# Patient Record
Sex: MALE | Race: WHITE | NOT HISPANIC OR LATINO | Employment: FULL TIME | URBAN - METROPOLITAN AREA
[De-identification: names, ages, dates, MRNs, and addresses within clinical notes are randomized per-mention and may not be internally consistent; named-entity substitution may affect disease eponyms.]

---

## 2020-10-27 PROBLEM — M62.08 DIASTASIS RECTI: Status: ACTIVE | Noted: 2019-03-14

## 2020-10-27 PROBLEM — R06.83 SNORING: Status: ACTIVE | Noted: 2019-03-14

## 2020-10-27 PROBLEM — N52.8 OTHER MALE ERECTILE DYSFUNCTION: Status: ACTIVE | Noted: 2017-08-09

## 2020-10-27 PROBLEM — R73.01 IMPAIRED FASTING GLUCOSE: Status: ACTIVE | Noted: 2020-02-14

## 2020-10-27 RX ORDER — SILDENAFIL CITRATE 20 MG/1
5 TABLET ORAL
COMMUNITY
Start: 2017-08-09 | End: 2020-10-28

## 2020-10-27 RX ORDER — HYDROCHLOROTHIAZIDE 25 MG/1
25 TABLET ORAL DAILY
COMMUNITY
Start: 2020-02-17 | End: 2021-03-16

## 2020-10-28 ENCOUNTER — OFFICE VISIT (OUTPATIENT)
Dept: FAMILY MEDICINE CLINIC | Facility: CLINIC | Age: 65
End: 2020-10-28
Payer: COMMERCIAL

## 2020-10-28 VITALS
SYSTOLIC BLOOD PRESSURE: 138 MMHG | HEART RATE: 84 BPM | WEIGHT: 294 LBS | DIASTOLIC BLOOD PRESSURE: 80 MMHG | HEIGHT: 74 IN | OXYGEN SATURATION: 97 % | TEMPERATURE: 98.2 F | RESPIRATION RATE: 16 BRPM | BODY MASS INDEX: 37.73 KG/M2

## 2020-10-28 DIAGNOSIS — Z11.59 NEED FOR HEPATITIS C SCREENING TEST: ICD-10-CM

## 2020-10-28 DIAGNOSIS — I10 BENIGN ESSENTIAL HYPERTENSION: ICD-10-CM

## 2020-10-28 DIAGNOSIS — R73.01 IMPAIRED FASTING GLUCOSE: ICD-10-CM

## 2020-10-28 DIAGNOSIS — Z00.00 ANNUAL PHYSICAL EXAM: ICD-10-CM

## 2020-10-28 DIAGNOSIS — M51.37 DDD (DEGENERATIVE DISC DISEASE), LUMBOSACRAL: ICD-10-CM

## 2020-10-28 DIAGNOSIS — Z12.5 PROSTATE CANCER SCREENING: ICD-10-CM

## 2020-10-28 DIAGNOSIS — E78.6 LOW HDL (UNDER 40): ICD-10-CM

## 2020-10-28 DIAGNOSIS — Z23 NEEDS FLU SHOT: ICD-10-CM

## 2020-10-28 DIAGNOSIS — Z00.00 ENCOUNTER FOR ANNUAL PHYSICAL EXAM: Primary | ICD-10-CM

## 2020-10-28 DIAGNOSIS — N40.0 BENIGN PROSTATIC HYPERPLASIA, UNSPECIFIED WHETHER LOWER URINARY TRACT SYMPTOMS PRESENT: ICD-10-CM

## 2020-10-28 PROCEDURE — 3075F SYST BP GE 130 - 139MM HG: CPT | Performed by: FAMILY MEDICINE

## 2020-10-28 PROCEDURE — 90471 IMMUNIZATION ADMIN: CPT

## 2020-10-28 PROCEDURE — 3008F BODY MASS INDEX DOCD: CPT | Performed by: FAMILY MEDICINE

## 2020-10-28 PROCEDURE — 3288F FALL RISK ASSESSMENT DOCD: CPT | Performed by: FAMILY MEDICINE

## 2020-10-28 PROCEDURE — 3079F DIAST BP 80-89 MM HG: CPT | Performed by: FAMILY MEDICINE

## 2020-10-28 PROCEDURE — 99387 INIT PM E/M NEW PAT 65+ YRS: CPT | Performed by: FAMILY MEDICINE

## 2020-10-28 PROCEDURE — 1160F RVW MEDS BY RX/DR IN RCRD: CPT | Performed by: FAMILY MEDICINE

## 2020-10-28 PROCEDURE — 3725F SCREEN DEPRESSION PERFORMED: CPT | Performed by: FAMILY MEDICINE

## 2020-10-28 PROCEDURE — 90662 IIV NO PRSV INCREASED AG IM: CPT

## 2020-10-28 PROCEDURE — 1101F PT FALLS ASSESS-DOCD LE1/YR: CPT | Performed by: FAMILY MEDICINE

## 2020-10-28 RX ORDER — TAMSULOSIN HYDROCHLORIDE 0.4 MG/1
0.4 CAPSULE ORAL
Qty: 90 CAPSULE | Refills: 3 | Status: SHIPPED | OUTPATIENT
Start: 2020-10-28 | End: 2021-10-13 | Stop reason: SDUPTHER

## 2020-10-28 RX ORDER — ENALAPRIL MALEATE 20 MG/1
TABLET ORAL
COMMUNITY
Start: 2020-10-07 | End: 2021-01-20

## 2020-11-06 ENCOUNTER — TELEPHONE (OUTPATIENT)
Dept: FAMILY MEDICINE CLINIC | Facility: CLINIC | Age: 65
End: 2020-11-06

## 2020-11-09 ENCOUNTER — TELEPHONE (OUTPATIENT)
Dept: FAMILY MEDICINE CLINIC | Facility: CLINIC | Age: 65
End: 2020-11-09

## 2020-11-10 ENCOUNTER — TELEPHONE (OUTPATIENT)
Dept: FAMILY MEDICINE CLINIC | Facility: CLINIC | Age: 65
End: 2020-11-10

## 2020-11-16 LAB — HCV AB SER-ACNC: NEGATIVE

## 2021-01-20 DIAGNOSIS — I10 BENIGN ESSENTIAL HYPERTENSION: Primary | ICD-10-CM

## 2021-01-20 RX ORDER — ENALAPRIL MALEATE 20 MG/1
TABLET ORAL
Qty: 30 TABLET | Refills: 0 | Status: SHIPPED | OUTPATIENT
Start: 2021-01-20 | End: 2021-02-15 | Stop reason: SDUPTHER

## 2021-02-04 ENCOUNTER — TELEPHONE (OUTPATIENT)
Dept: FAMILY MEDICINE CLINIC | Facility: CLINIC | Age: 66
End: 2021-02-04

## 2021-02-04 NOTE — TELEPHONE ENCOUNTER
Patient called about the covid vaccine, he wants to know if you feel there is any issue/health reasons why he should not reeceive the vaccine      Please call him at [phone]

## 2021-02-04 NOTE — TELEPHONE ENCOUNTER
Spoke to pt made him aware per Dr Claudia Talbot he should get the vaccine  I gave him info on My Chart and sent him a link for it  I also told him I can add him to my list to get scheduled once we go into the next phase    Riccardo Phillip

## 2021-02-04 NOTE — TELEPHONE ENCOUNTER
Let patient know that I would recommend he get the vaccine  He can sign up for my chart and complete questionnaire  Will receive notification when eligible

## 2021-02-15 DIAGNOSIS — I10 BENIGN ESSENTIAL HYPERTENSION: ICD-10-CM

## 2021-02-15 RX ORDER — ENALAPRIL MALEATE 20 MG/1
20 TABLET ORAL DAILY
Qty: 30 TABLET | Refills: 3 | Status: SHIPPED | OUTPATIENT
Start: 2021-02-15 | End: 2021-08-03

## 2021-03-10 DIAGNOSIS — Z23 ENCOUNTER FOR IMMUNIZATION: ICD-10-CM

## 2021-03-12 DIAGNOSIS — I10 BENIGN ESSENTIAL HYPERTENSION: Primary | ICD-10-CM

## 2021-03-16 RX ORDER — HYDROCHLOROTHIAZIDE 25 MG/1
25 TABLET ORAL DAILY
Qty: 30 TABLET | Refills: 5 | Status: SHIPPED | OUTPATIENT
Start: 2021-03-16 | End: 2021-10-13 | Stop reason: SDUPTHER

## 2021-07-07 NOTE — PROGRESS NOTES
Assessment/Plan:    No problem-specific Assessment & Plan notes found for this encounter  Diagnoses and all orders for this visit:    Benign essential hypertension  Not at goal    Patient hesitant to increase medication at this time or add any other meds right now  He will try and watch diet more closely and decrease salt in diet  Start exercising more  Will agree to get cuff and check ambulatory blood pressures and message me with readings in a month or so  Recheck bp here in office in 2 months  Low HDL (under 40)  -     Lipid panel; Future  Exercise to increase HDL  Benign prostatic hyperplasia, unspecified whether lower urinary tract symptoms present  Taking flomax and tolerating well  Still gets up once at night to urinate  Impaired fasting glucose  -     Comprehensive metabolic panel; Future  -     Hemoglobin A1C; Future    Screening for prostate cancer  -     PSA, Total Screen; Future    Class 2 obesity due to excess calories without serious comorbidity with body mass index (BMI) of 37 0 to 37 9 in adult        BMI Counseling: Body mass index is 37 36 kg/m²  The BMI is above normal  Nutrition recommendations include decreasing portion sizes and encouraging healthy choices of fruits and vegetables  Exercise recommendations include exercising 3-5 times per week  No pharmacotherapy was ordered  Subjective:      Patient ID: Balta Glover is a 76 y o  male with hypertension, impaired fasting glucose, low HDL and BPH who is here today for routine f/u visit  Is taking his bp meds as prescribed  No chest pain, shortness of breath or palpitations  No headache or dizziness  Had his colonoscopy with Dr Terrance Snow in November of last year  Had a tubular adenoma  Received both covid vacccines  Not exercising regularly  Hoping to go back to [employer]  States that he could be eating better  Working from home and it's too easy for him to walk to kitchen and snack during day  Knees bother him  Had seen Dr Emil Marley in past and has appt scheduled for next Wednesday  HPI    The following portions of the patient's history were reviewed and updated as appropriate: He  has a past medical history of BPH (benign prostatic hyperplasia), Dyslipidemia, Hypertension, Impaired fasting glucose, and Osteoarthritis of both knees  He  has a past surgical history that includes Colonoscopy (11/23/2020)  He  reports that he has never smoked  He has never used smokeless tobacco  He reports current alcohol use  He reports that he does not use drugs  Current Outpatient Medications on File Prior to Visit   Medication Sig    enalapril (VASOTEC) 20 mg tablet Take 1 tablet (20 mg total) by mouth daily    hydrochlorothiazide (HYDRODIURIL) 25 mg tablet Take 1 tablet (25 mg total) by mouth daily    tamsulosin (FLOMAX) 0 4 mg Take 1 capsule (0 4 mg total) by mouth daily with dinner     No current facility-administered medications on file prior to visit  He has No Known Allergies       Review of Systems      Objective:      /80 (BP Location: Left arm, Patient Position: Sitting, Cuff Size: Large)   Pulse 91   Temp 97 5 °F (36 4 °C) (Temporal)   Resp 16   Ht 6' 2" (1 88 m)   Wt 132 kg (291 lb)   SpO2 96%   BMI 37 36 kg/m²          Physical Exam  Vitals and nursing note reviewed  Constitutional:       General: He is not in acute distress  Appearance: Normal appearance  He is not ill-appearing, toxic-appearing or diaphoretic  Comments: Antalgic gait   HENT:      Head: Normocephalic and atraumatic  Eyes:      Conjunctiva/sclera: Conjunctivae normal    Neck:      Comments: No thyromegaly  Cardiovascular:      Rate and Rhythm: Normal rate and regular rhythm  Heart sounds: No murmur heard  Pulmonary:      Effort: Pulmonary effort is normal       Breath sounds: Normal breath sounds  Musculoskeletal:      Cervical back: Normal range of motion        Right lower leg: No edema    Lymphadenopathy:      Cervical: No cervical adenopathy  Neurological:      Mental Status: He is alert     Psychiatric:         Mood and Affect: Mood normal

## 2021-07-08 ENCOUNTER — OFFICE VISIT (OUTPATIENT)
Dept: FAMILY MEDICINE CLINIC | Facility: CLINIC | Age: 66
End: 2021-07-08
Payer: COMMERCIAL

## 2021-07-08 VITALS
OXYGEN SATURATION: 96 % | BODY MASS INDEX: 37.35 KG/M2 | WEIGHT: 291 LBS | RESPIRATION RATE: 16 BRPM | HEIGHT: 74 IN | SYSTOLIC BLOOD PRESSURE: 140 MMHG | DIASTOLIC BLOOD PRESSURE: 82 MMHG | TEMPERATURE: 97.5 F | HEART RATE: 91 BPM

## 2021-07-08 DIAGNOSIS — R73.01 IMPAIRED FASTING GLUCOSE: ICD-10-CM

## 2021-07-08 DIAGNOSIS — E66.09 CLASS 2 OBESITY DUE TO EXCESS CALORIES WITHOUT SERIOUS COMORBIDITY WITH BODY MASS INDEX (BMI) OF 37.0 TO 37.9 IN ADULT: ICD-10-CM

## 2021-07-08 DIAGNOSIS — Z12.5 SCREENING FOR PROSTATE CANCER: ICD-10-CM

## 2021-07-08 DIAGNOSIS — N40.0 BENIGN PROSTATIC HYPERPLASIA, UNSPECIFIED WHETHER LOWER URINARY TRACT SYMPTOMS PRESENT: ICD-10-CM

## 2021-07-08 DIAGNOSIS — E78.6 LOW HDL (UNDER 40): ICD-10-CM

## 2021-07-08 DIAGNOSIS — I10 BENIGN ESSENTIAL HYPERTENSION: Primary | ICD-10-CM

## 2021-07-08 PROBLEM — E66.812 CLASS 2 OBESITY DUE TO EXCESS CALORIES WITHOUT SERIOUS COMORBIDITY IN ADULT: Status: ACTIVE | Noted: 2021-07-08

## 2021-07-08 PROCEDURE — 1160F RVW MEDS BY RX/DR IN RCRD: CPT | Performed by: FAMILY MEDICINE

## 2021-07-08 PROCEDURE — 1036F TOBACCO NON-USER: CPT | Performed by: FAMILY MEDICINE

## 2021-07-08 PROCEDURE — 3079F DIAST BP 80-89 MM HG: CPT | Performed by: FAMILY MEDICINE

## 2021-07-08 PROCEDURE — 3008F BODY MASS INDEX DOCD: CPT | Performed by: FAMILY MEDICINE

## 2021-07-08 PROCEDURE — 3077F SYST BP >= 140 MM HG: CPT | Performed by: FAMILY MEDICINE

## 2021-07-08 PROCEDURE — 3725F SCREEN DEPRESSION PERFORMED: CPT | Performed by: FAMILY MEDICINE

## 2021-07-08 PROCEDURE — 99214 OFFICE O/P EST MOD 30 MIN: CPT | Performed by: FAMILY MEDICINE

## 2021-07-08 NOTE — PATIENT INSTRUCTIONS
Get blood pressure cuff and check blood pressure readings at home  Message me with your readings in a month's time  Try and watch diet better and exercise more  Return in 2 months for bp check

## 2021-07-09 ENCOUNTER — TELEPHONE (OUTPATIENT)
Dept: ADMINISTRATIVE | Facility: OTHER | Age: 66
End: 2021-07-09

## 2021-07-09 NOTE — TELEPHONE ENCOUNTER
Upon review of the In Basket request and the patient's chart, initial outreach has been made via telephone call, please see Contacts section for details       Thank you  Mati Knight MA

## 2021-07-09 NOTE — TELEPHONE ENCOUNTER
----- Message from Shawnee Silva DO sent at 7/8/2021  7:05 AM EDT -----  07/08/21 7:05 AM    Hello, our patient Lalito Curtis has had CRC: Colonoscopy completed/performed  Please assist in updating the patient chart by making an External outreach to Dr Jessy Patel facility located in [employer]  The date of service is 11/23/20  (the path report is in [facility] care everywhere but actual report of colonoscopy is not)        Thank you,  Shawnee Silva DO  [facility]

## 2021-07-13 NOTE — TELEPHONE ENCOUNTER
Upon review of the In Basket request we were able to locate, review, and update the patient chart as requested for CRC: Colonoscopy  Any additional questions or concerns should be emailed to the Practice Liaisons via [email or web address] email, please do not reply via In Basket      Thank you  Charan muller MA

## 2021-08-03 DIAGNOSIS — I10 BENIGN ESSENTIAL HYPERTENSION: ICD-10-CM

## 2021-08-03 RX ORDER — ENALAPRIL MALEATE 20 MG/1
TABLET ORAL
Qty: 30 TABLET | Refills: 0 | Status: SHIPPED | OUTPATIENT
Start: 2021-08-03 | End: 2021-09-09

## 2021-09-08 ENCOUNTER — APPOINTMENT (OUTPATIENT)
Dept: LAB | Facility: CLINIC | Age: 66
End: 2021-09-08
Payer: COMMERCIAL

## 2021-09-08 DIAGNOSIS — Z12.5 PROSTATE CANCER SCREENING: ICD-10-CM

## 2021-09-08 DIAGNOSIS — E78.6 LOW HDL (UNDER 40): ICD-10-CM

## 2021-09-08 DIAGNOSIS — R73.01 IMPAIRED FASTING GLUCOSE: ICD-10-CM

## 2021-09-08 DIAGNOSIS — I10 BENIGN ESSENTIAL HYPERTENSION: ICD-10-CM

## 2021-09-08 DIAGNOSIS — Z12.5 SCREENING FOR PROSTATE CANCER: ICD-10-CM

## 2021-09-08 DIAGNOSIS — Z11.59 NEED FOR HEPATITIS C SCREENING TEST: ICD-10-CM

## 2021-09-08 LAB
ALBUMIN SERPL BCP-MCNC: 3.4 G/DL (ref 3.5–5)
ALP SERPL-CCNC: 44 U/L (ref 46–116)
ALT SERPL W P-5'-P-CCNC: 41 U/L (ref 12–78)
ANION GAP SERPL CALCULATED.3IONS-SCNC: 7 MMOL/L (ref 4–13)
AST SERPL W P-5'-P-CCNC: 15 U/L (ref 5–45)
BILIRUB SERPL-MCNC: 0.33 MG/DL (ref 0.2–1)
BUN SERPL-MCNC: 21 MG/DL (ref 5–25)
CALCIUM ALBUM COR SERPL-MCNC: 9.9 MG/DL (ref 8.3–10.1)
CALCIUM SERPL-MCNC: 9.4 MG/DL (ref 8.3–10.1)
CHLORIDE SERPL-SCNC: 107 MMOL/L (ref 100–108)
CHOLEST SERPL-MCNC: 164 MG/DL (ref 50–200)
CO2 SERPL-SCNC: 25 MMOL/L (ref 21–32)
CREAT SERPL-MCNC: 0.99 MG/DL (ref 0.6–1.3)
EST. AVERAGE GLUCOSE BLD GHB EST-MCNC: 111 MG/DL
GFR SERPL CREATININE-BSD FRML MDRD: 79 ML/MIN/1.73SQ M
GLUCOSE P FAST SERPL-MCNC: 103 MG/DL (ref 65–99)
HBA1C MFR BLD: 5.5 %
HCV AB SER QL: NORMAL
HDLC SERPL-MCNC: 36 MG/DL
LDLC SERPL CALC-MCNC: 104 MG/DL (ref 0–100)
NONHDLC SERPL-MCNC: 128 MG/DL
POTASSIUM SERPL-SCNC: 3.8 MMOL/L (ref 3.5–5.3)
PROT SERPL-MCNC: 7 G/DL (ref 6.4–8.2)
PSA SERPL-MCNC: 0.7 NG/ML (ref 0–4)
SODIUM SERPL-SCNC: 139 MMOL/L (ref 136–145)
TRIGL SERPL-MCNC: 122 MG/DL

## 2021-09-08 PROCEDURE — 80053 COMPREHEN METABOLIC PANEL: CPT

## 2021-09-08 PROCEDURE — 80061 LIPID PANEL: CPT

## 2021-09-08 PROCEDURE — 83036 HEMOGLOBIN GLYCOSYLATED A1C: CPT

## 2021-09-08 PROCEDURE — G0103 PSA SCREENING: HCPCS

## 2021-09-08 PROCEDURE — 36415 COLL VENOUS BLD VENIPUNCTURE: CPT

## 2021-09-08 PROCEDURE — 86803 HEPATITIS C AB TEST: CPT

## 2021-09-08 NOTE — PROGRESS NOTES
Bmp  Assessment/Plan:    No problem-specific Assessment & Plan notes found for this encounter  Diagnoses and all orders for this visit:    Benign essential hypertension  -     enalapril (VASOTEC) 20 mg tablet; Take 1 tablet (20 mg total) by mouth 2 (two) times a day  -     Basic metabolic panel; Future  bp remains uncontrolled  bp was elevated at 160/92 initially but came down to 140/80 after sitting in room  Increase vasotec to bid   Recheck bp in 1 month  Get bmp prior to next OV  Impaired fasting glucose  Fasting sugar 103  A1c was good at 5 5   Low HDL (under 40)  Lab Results   Component Value Date    CHOLESTEROL 164 09/08/2021     Lab Results   Component Value Date    HDL 36 (L) 09/08/2021     Lab Results   Component Value Date    TRIG 122 09/08/2021     Lab Results   Component Value Date    [place] 128 09/08/2021     HDL remains low  Recommend exercise         Subjective:      Patient ID: Isaura Thomas is a 76 y o  male with hypertension, dyslipidemia, BPH and impaired fasting glucose here today for recheck of his bp      bp at last office visit in July was not at goal  He was hesitant to add additional medication to his regimen at the time  He agreed to monitor in ambulatory setting and try lifestyle modification (diet/exercise)  Not exercising regularly  He is physically active at home  He is planning to retire within the month and hoping to have more time to exercise  He denies any chest pain, palpitations, shortness of breath  Plans to wait til next OV for his flu vaccine  HPI    The following portions of the patient's history were reviewed and updated as appropriate:   He  has a past medical history of BPH (benign prostatic hyperplasia), Dyslipidemia, Hypertension, Impaired fasting glucose, and Osteoarthritis of both knees  He  has a past surgical history that includes Colonoscopy (11/23/2020)  He  reports that he has never smoked   He has never used smokeless tobacco  He reports current alcohol use  He reports that he does not use drugs  Current Outpatient Medications on File Prior to Visit   Medication Sig    hydrochlorothiazide (HYDRODIURIL) 25 mg tablet Take 1 tablet (25 mg total) by mouth daily    tamsulosin (FLOMAX) 0 4 mg Take 1 capsule (0 4 mg total) by mouth daily with dinner    [DISCONTINUED] enalapril (VASOTEC) 20 mg tablet TAKE 1 TABLET DAILY     No current facility-administered medications on file prior to visit  He has No Known Allergies       Review of Systems      Objective:      /92 (BP Location: Left arm, Patient Position: Sitting, Cuff Size: Large)   Pulse 84   Temp 97 5 °F (36 4 °C) (Temporal)   Resp 18   Ht 6' 2" (1 88 m)   Wt 134 kg (295 lb 6 4 oz)   SpO2 96%   BMI 37 93 kg/m²          Physical Exam  Vitals and nursing note reviewed  Constitutional:       Appearance: Normal appearance  He is obese  HENT:      Head: Normocephalic and atraumatic  Eyes:      Conjunctiva/sclera: Conjunctivae normal    Cardiovascular:      Rate and Rhythm: Normal rate and regular rhythm  Pulmonary:      Effort: Pulmonary effort is normal       Breath sounds: Normal breath sounds  Musculoskeletal:      Cervical back: Normal range of motion  Right lower leg: No edema  Left lower leg: No edema  Neurological:      Mental Status: He is alert     Psychiatric:         Mood and Affect: Mood normal

## 2021-09-09 ENCOUNTER — OFFICE VISIT (OUTPATIENT)
Dept: FAMILY MEDICINE CLINIC | Facility: CLINIC | Age: 66
End: 2021-09-09
Payer: COMMERCIAL

## 2021-09-09 VITALS
HEIGHT: 74 IN | RESPIRATION RATE: 18 BRPM | WEIGHT: 295.4 LBS | HEART RATE: 84 BPM | BODY MASS INDEX: 37.91 KG/M2 | OXYGEN SATURATION: 96 % | DIASTOLIC BLOOD PRESSURE: 80 MMHG | TEMPERATURE: 97.5 F | SYSTOLIC BLOOD PRESSURE: 140 MMHG

## 2021-09-09 DIAGNOSIS — I10 BENIGN ESSENTIAL HYPERTENSION: Primary | ICD-10-CM

## 2021-09-09 DIAGNOSIS — E78.6 LOW HDL (UNDER 40): ICD-10-CM

## 2021-09-09 DIAGNOSIS — R73.01 IMPAIRED FASTING GLUCOSE: ICD-10-CM

## 2021-09-09 PROCEDURE — 99214 OFFICE O/P EST MOD 30 MIN: CPT | Performed by: FAMILY MEDICINE

## 2021-09-09 PROCEDURE — 3288F FALL RISK ASSESSMENT DOCD: CPT | Performed by: FAMILY MEDICINE

## 2021-09-09 PROCEDURE — 1036F TOBACCO NON-USER: CPT | Performed by: FAMILY MEDICINE

## 2021-09-09 PROCEDURE — 1101F PT FALLS ASSESS-DOCD LE1/YR: CPT | Performed by: FAMILY MEDICINE

## 2021-09-09 PROCEDURE — 3008F BODY MASS INDEX DOCD: CPT | Performed by: FAMILY MEDICINE

## 2021-09-09 PROCEDURE — 1160F RVW MEDS BY RX/DR IN RCRD: CPT | Performed by: FAMILY MEDICINE

## 2021-09-09 RX ORDER — ENALAPRIL MALEATE 20 MG/1
20 TABLET ORAL 2 TIMES DAILY
Qty: 60 TABLET | Refills: 5
Start: 2021-09-09 | End: 2021-09-22 | Stop reason: SDUPTHER

## 2021-09-09 NOTE — PATIENT INSTRUCTIONS
Increase your vasotec from 20 mg once daily to twice daily  Get labs to check kidney function and electrolyte in 1 month followed by appt here to check blood pressure

## 2021-09-22 DIAGNOSIS — I10 BENIGN ESSENTIAL HYPERTENSION: ICD-10-CM

## 2021-09-22 RX ORDER — ENALAPRIL MALEATE 20 MG/1
20 TABLET ORAL 2 TIMES DAILY
Qty: 60 TABLET | Refills: 5 | Status: SHIPPED | OUTPATIENT
Start: 2021-09-22 | End: 2021-10-13 | Stop reason: SDUPTHER

## 2021-09-24 DIAGNOSIS — I10 BENIGN ESSENTIAL HYPERTENSION: ICD-10-CM

## 2021-09-24 NOTE — TELEPHONE ENCOUNTER
Patient called in asking to see if his blood pressure medication can be enalipril 40mg once a day instead of the 20mg twice a day-

## 2021-09-27 NOTE — TELEPHONE ENCOUNTER
Can take both tablets at same time if he does not want to take it bid  The med does not come in 40 mg dosage though

## 2021-09-29 NOTE — TELEPHONE ENCOUNTER
Noted  It should not be too soon as he is taking the same amount of medication just changing when he is taking them

## 2021-10-07 ENCOUNTER — OFFICE VISIT (OUTPATIENT)
Dept: FAMILY MEDICINE CLINIC | Facility: CLINIC | Age: 66
End: 2021-10-07
Payer: MEDICARE

## 2021-10-07 VITALS
WEIGHT: 295.6 LBS | TEMPERATURE: 97.9 F | HEART RATE: 87 BPM | HEIGHT: 74 IN | BODY MASS INDEX: 37.94 KG/M2 | RESPIRATION RATE: 16 BRPM | SYSTOLIC BLOOD PRESSURE: 126 MMHG | DIASTOLIC BLOOD PRESSURE: 80 MMHG | OXYGEN SATURATION: 97 %

## 2021-10-07 DIAGNOSIS — Z23 ENCOUNTER FOR IMMUNIZATION: ICD-10-CM

## 2021-10-07 DIAGNOSIS — I10 BENIGN ESSENTIAL HYPERTENSION: Primary | ICD-10-CM

## 2021-10-07 PROBLEM — E66.01 CLASS 2 SEVERE OBESITY DUE TO EXCESS CALORIES WITH SERIOUS COMORBIDITY AND BODY MASS INDEX (BMI) OF 38.0 TO 38.9 IN ADULT (HCC): Status: RESOLVED | Noted: 2021-07-08 | Resolved: 2021-10-07

## 2021-10-07 PROBLEM — E66.01 CLASS 2 SEVERE OBESITY DUE TO EXCESS CALORIES WITH SERIOUS COMORBIDITY AND BODY MASS INDEX (BMI) OF 38.0 TO 38.9 IN ADULT (HCC): Status: ACTIVE | Noted: 2021-07-08

## 2021-10-07 PROBLEM — E66.812 CLASS 2 SEVERE OBESITY DUE TO EXCESS CALORIES WITH SERIOUS COMORBIDITY AND BODY MASS INDEX (BMI) OF 38.0 TO 38.9 IN ADULT (HCC): Status: RESOLVED | Noted: 2021-07-08 | Resolved: 2021-10-07

## 2021-10-07 PROCEDURE — G0008 ADMIN INFLUENZA VIRUS VAC: HCPCS | Performed by: FAMILY MEDICINE

## 2021-10-07 PROCEDURE — 3008F BODY MASS INDEX DOCD: CPT | Performed by: FAMILY MEDICINE

## 2021-10-07 PROCEDURE — 99213 OFFICE O/P EST LOW 20 MIN: CPT | Performed by: FAMILY MEDICINE

## 2021-10-07 PROCEDURE — 90662 IIV NO PRSV INCREASED AG IM: CPT | Performed by: FAMILY MEDICINE

## 2021-10-13 DIAGNOSIS — I10 BENIGN ESSENTIAL HYPERTENSION: ICD-10-CM

## 2021-10-13 DIAGNOSIS — N40.0 BENIGN PROSTATIC HYPERPLASIA, UNSPECIFIED WHETHER LOWER URINARY TRACT SYMPTOMS PRESENT: ICD-10-CM

## 2021-10-13 RX ORDER — TAMSULOSIN HYDROCHLORIDE 0.4 MG/1
0.4 CAPSULE ORAL
Qty: 90 CAPSULE | Refills: 3 | Status: SHIPPED | OUTPATIENT
Start: 2021-10-13 | End: 2021-10-26 | Stop reason: SDUPTHER

## 2021-10-13 RX ORDER — ENALAPRIL MALEATE 20 MG/1
20 TABLET ORAL 2 TIMES DAILY
Qty: 60 TABLET | Refills: 5 | Status: SHIPPED | OUTPATIENT
Start: 2021-10-13 | End: 2021-10-26 | Stop reason: SDUPTHER

## 2021-10-13 RX ORDER — HYDROCHLOROTHIAZIDE 25 MG/1
25 TABLET ORAL DAILY
Qty: 30 TABLET | Refills: 5 | Status: SHIPPED | OUTPATIENT
Start: 2021-10-13 | End: 2021-10-26 | Stop reason: SDUPTHER

## 2021-10-26 ENCOUNTER — TELEPHONE (OUTPATIENT)
Dept: FAMILY MEDICINE CLINIC | Facility: CLINIC | Age: 66
End: 2021-10-26

## 2021-10-26 DIAGNOSIS — N40.0 BENIGN PROSTATIC HYPERPLASIA, UNSPECIFIED WHETHER LOWER URINARY TRACT SYMPTOMS PRESENT: ICD-10-CM

## 2021-10-26 DIAGNOSIS — I10 BENIGN ESSENTIAL HYPERTENSION: ICD-10-CM

## 2021-10-27 RX ORDER — TAMSULOSIN HYDROCHLORIDE 0.4 MG/1
0.4 CAPSULE ORAL
Qty: 90 CAPSULE | Refills: 1 | Status: SHIPPED | OUTPATIENT
Start: 2021-10-27 | End: 2022-02-03 | Stop reason: SDUPTHER

## 2021-10-27 RX ORDER — HYDROCHLOROTHIAZIDE 25 MG/1
25 TABLET ORAL DAILY
Qty: 30 TABLET | Refills: 5 | Status: SHIPPED | OUTPATIENT
Start: 2021-10-27 | End: 2022-04-27

## 2021-10-27 RX ORDER — ENALAPRIL MALEATE 20 MG/1
20 TABLET ORAL 2 TIMES DAILY
Qty: 60 TABLET | Refills: 5 | Status: SHIPPED | OUTPATIENT
Start: 2021-10-27 | End: 2022-05-20 | Stop reason: SDUPTHER

## 2022-02-03 ENCOUNTER — TELEPHONE (OUTPATIENT)
Dept: FAMILY MEDICINE CLINIC | Facility: CLINIC | Age: 67
End: 2022-02-03

## 2022-02-03 DIAGNOSIS — N40.0 BENIGN PROSTATIC HYPERPLASIA, UNSPECIFIED WHETHER LOWER URINARY TRACT SYMPTOMS PRESENT: ICD-10-CM

## 2022-02-03 RX ORDER — TAMSULOSIN HYDROCHLORIDE 0.4 MG/1
0.4 CAPSULE ORAL
Qty: 90 CAPSULE | Refills: 1 | Status: SHIPPED | OUTPATIENT
Start: 2022-02-03

## 2022-02-03 NOTE — TELEPHONE ENCOUNTER
Last seen in the fall by Dr Lauren Allred  He's asking for a refill of Tamsulosin (Flomax) 0 4mg 1 daily    [facility]

## 2022-04-27 DIAGNOSIS — I10 BENIGN ESSENTIAL HYPERTENSION: ICD-10-CM

## 2022-04-27 RX ORDER — HYDROCHLOROTHIAZIDE 25 MG/1
TABLET ORAL
Qty: 90 TABLET | Refills: 1 | Status: SHIPPED | OUTPATIENT
Start: 2022-04-27 | End: 2022-07-22 | Stop reason: SDUPTHER

## 2022-05-20 ENCOUNTER — OFFICE VISIT (OUTPATIENT)
Dept: FAMILY MEDICINE CLINIC | Facility: CLINIC | Age: 67
End: 2022-05-20
Payer: MEDICARE

## 2022-05-20 VITALS
SYSTOLIC BLOOD PRESSURE: 118 MMHG | TEMPERATURE: 98.9 F | BODY MASS INDEX: 36.81 KG/M2 | DIASTOLIC BLOOD PRESSURE: 72 MMHG | WEIGHT: 286.8 LBS | HEART RATE: 81 BPM | HEIGHT: 74 IN | OXYGEN SATURATION: 97 %

## 2022-05-20 DIAGNOSIS — I10 BENIGN ESSENTIAL HYPERTENSION: Primary | ICD-10-CM

## 2022-05-20 DIAGNOSIS — R73.01 IMPAIRED FASTING GLUCOSE: ICD-10-CM

## 2022-05-20 DIAGNOSIS — M17.0 BILATERAL PRIMARY OSTEOARTHRITIS OF KNEE: ICD-10-CM

## 2022-05-20 PROCEDURE — 99214 OFFICE O/P EST MOD 30 MIN: CPT | Performed by: FAMILY MEDICINE

## 2022-05-20 RX ORDER — ENALAPRIL MALEATE 20 MG/1
20 TABLET ORAL 2 TIMES DAILY
Qty: 180 TABLET | Refills: 1 | Status: SHIPPED | OUTPATIENT
Start: 2022-05-20 | End: 2022-07-22 | Stop reason: SDUPTHER

## 2022-05-20 NOTE — PROGRESS NOTES
Assessment/Plan:    Benign essential hypertension  Well controlled  Cont present treatment  Monitor labs  Recheck 6m      Bilateral primary osteoarthritis of knee  Improved s/p injections with ortho last month  Continue present care  F/u with ortho  Recheck 6m    Impaired fasting glucose  Reviewed diet and exercise  Monitor labs  Recheck 6m - earlier if BGs area higher       Diagnoses and all orders for this visit:    Benign essential hypertension  -     CBC and differential; Future  -     Comprehensive metabolic panel; Future  -     Lipid panel; Future  -     enalapril (VASOTEC) 20 mg tablet; Take 1 tablet (20 mg total) by mouth in the morning and 1 tablet (20 mg total) in the evening  Impaired fasting glucose    Bilateral primary osteoarthritis of knee        Subjective:      Patient ID: Etelvina Guerrero is a 78 y o  male  Here to be established  - pt with a hx of HTN  He states that he has felt well  He exercises 3-5x week  Walks on treadmill and dos some resistance  Denies CP, palpitations, lightheadedness or other CV symptoms with or without exertion     - pt also has hx of knee OA - followed by ortho  Has intermittent pain, and has received injections in the past (last done last month)  Knees are stable now   - pt denies any GI complaints  Up to date with colonoscopy  Due for repeat in 2025  - has nocturia x 2-3/night  Takes tamsulosin every evening  No dysuria, hematuria or other complaints  - I reviewed PMHx, PSHx, Fam Hx and Soc Hx with pt    - full ROS done    The following portions of the patient's history were reviewed and updated as appropriate:   He  has a past medical history of BPH (benign prostatic hyperplasia), Dyslipidemia, Hypertension, Impaired fasting glucose, and Osteoarthritis of both knees    He   Patient Active Problem List    Diagnosis Date Noted    Bilateral primary osteoarthritis of knee 05/24/2022    Impaired fasting glucose 02/14/2020    Diastasis recti 03/14/2019  Snoring 03/14/2019    Other male erectile dysfunction 08/09/2017    Spinal stenosis of lumbar region 01/16/2015    Abnormal electromyogram 11/20/2014    BPH (benign prostatic hyperplasia) 11/20/2014    DDD (degenerative disc disease), lumbosacral 08/21/2014    Low HDL (under 40) 03/15/2013    Benign essential hypertension 03/15/2010     He  has a past surgical history that includes Colonoscopy (11/23/2020)  He  reports that he has never smoked  He has never used smokeless tobacco  He reports current alcohol use  He reports that he does not use drugs  Current Outpatient Medications   Medication Sig Dispense Refill    enalapril (VASOTEC) 20 mg tablet Take 1 tablet (20 mg total) by mouth in the morning and 1 tablet (20 mg total) in the evening  180 tablet 1    hydrochlorothiazide (HYDRODIURIL) 25 mg tablet TAKE 1 TABLET DAILY 90 tablet 1    tamsulosin (FLOMAX) 0 4 mg Take 1 capsule (0 4 mg total) by mouth daily with dinner 90 capsule 1     No current facility-administered medications for this visit  He has No Known Allergies       Review of Systems   Constitutional: Negative  HENT: Negative  Eyes: Negative  Respiratory: Negative  Cardiovascular: Negative  Gastrointestinal: Negative  Endocrine: Negative  Genitourinary: Negative  Musculoskeletal: Positive for arthralgias and gait problem (occ when knees are painful)  Negative for back pain, joint swelling and myalgias  Skin: Negative  Allergic/Immunologic: Negative  Neurological: Negative for dizziness, facial asymmetry, weakness, light-headedness, numbness and headaches  Hematological: Negative  Psychiatric/Behavioral: Negative  Objective:      /72 (BP Location: Left arm, Patient Position: Sitting)   Pulse 81   Temp 98 9 °F (37 2 °C)   Ht 6' 2" (1 88 m)   Wt 130 kg (286 lb 12 8 oz)   SpO2 97%   BMI 36 82 kg/m²          Physical Exam  Vitals reviewed     Constitutional:       Appearance: He is well-developed  HENT:      Head: Normocephalic and atraumatic  Right Ear: Tympanic membrane, ear canal and external ear normal       Left Ear: Tympanic membrane, ear canal and external ear normal       Mouth/Throat:      Mouth: Mucous membranes are moist    Eyes:      General: No scleral icterus  Extraocular Movements: Extraocular movements intact  Conjunctiva/sclera: Conjunctivae normal       Pupils: Pupils are equal, round, and reactive to light  Neck:      Thyroid: No thyromegaly  Vascular: No carotid bruit  Cardiovascular:      Rate and Rhythm: Normal rate and regular rhythm  Pulses: Normal pulses  Heart sounds: Normal heart sounds  No murmur heard  Pulmonary:      Effort: Pulmonary effort is normal       Breath sounds: Normal breath sounds  Abdominal:      General: Bowel sounds are normal  There is no distension  Palpations: Abdomen is soft  There is no mass  Tenderness: There is no abdominal tenderness  Musculoskeletal:         General: Tenderness (mild over bilat knee joint line) present  No swelling or deformity  Normal range of motion  Cervical back: Normal range of motion and neck supple  No muscular tenderness  Right lower leg: Edema (trace) present  Left lower leg: Edema (trace) present  Lymphadenopathy:      Cervical: No cervical adenopathy  Skin:     General: Skin is warm and dry  Capillary Refill: Capillary refill takes less than 2 seconds  Neurological:      Mental Status: He is alert and oriented to person, place, and time  Cranial Nerves: No cranial nerve deficit  Sensory: No sensory deficit  Motor: No weakness  Gait: Gait normal       Deep Tendon Reflexes: Reflexes normal    Psychiatric:         Mood and Affect: Mood normal          Behavior: Behavior normal          Thought Content:  Thought content normal          Judgment: Judgment normal       Comments:

## 2022-05-24 PROBLEM — M17.0 BILATERAL PRIMARY OSTEOARTHRITIS OF KNEE: Status: ACTIVE | Noted: 2022-05-24

## 2022-07-22 DIAGNOSIS — I10 BENIGN ESSENTIAL HYPERTENSION: ICD-10-CM

## 2022-07-22 RX ORDER — ENALAPRIL MALEATE 20 MG/1
20 TABLET ORAL 2 TIMES DAILY
Qty: 180 TABLET | Refills: 1 | Status: SHIPPED | OUTPATIENT
Start: 2022-07-22

## 2022-07-22 RX ORDER — HYDROCHLOROTHIAZIDE 25 MG/1
25 TABLET ORAL DAILY
Qty: 90 TABLET | Refills: 1 | Status: SHIPPED | OUTPATIENT
Start: 2022-07-22

## 2022-11-28 ENCOUNTER — RA CDI HCC (OUTPATIENT)
Dept: OTHER | Facility: HOSPITAL | Age: 67
End: 2022-11-28

## 2022-11-28 NOTE — PROGRESS NOTES
[facility]  coding opportunities          Chart Reviewed number of suggestions sent to Provider: 1     Patients Insurance     Medicare Insurance: Medicare        [id number]

## 2022-12-06 ENCOUNTER — OFFICE VISIT (OUTPATIENT)
Dept: FAMILY MEDICINE CLINIC | Facility: CLINIC | Age: 67
End: 2022-12-06

## 2022-12-06 VITALS
OXYGEN SATURATION: 98 % | DIASTOLIC BLOOD PRESSURE: 70 MMHG | BODY MASS INDEX: 36.78 KG/M2 | HEIGHT: 74 IN | SYSTOLIC BLOOD PRESSURE: 138 MMHG | WEIGHT: 286.6 LBS | TEMPERATURE: 98 F | HEART RATE: 104 BPM

## 2022-12-06 DIAGNOSIS — I10 BENIGN ESSENTIAL HYPERTENSION: ICD-10-CM

## 2022-12-06 DIAGNOSIS — J02.9 PHARYNGITIS, UNSPECIFIED ETIOLOGY: Primary | ICD-10-CM

## 2022-12-06 DIAGNOSIS — N40.0 BENIGN PROSTATIC HYPERPLASIA, UNSPECIFIED WHETHER LOWER URINARY TRACT SYMPTOMS PRESENT: ICD-10-CM

## 2022-12-06 RX ORDER — TAMSULOSIN HYDROCHLORIDE 0.4 MG/1
0.4 CAPSULE ORAL
Qty: 15 CAPSULE | Refills: 0 | Status: SHIPPED | OUTPATIENT
Start: 2022-12-06 | End: 2022-12-21

## 2022-12-06 RX ORDER — TAMSULOSIN HYDROCHLORIDE 0.4 MG/1
0.4 CAPSULE ORAL
Qty: 90 CAPSULE | Refills: 1 | Status: SHIPPED | OUTPATIENT
Start: 2022-12-06

## 2022-12-06 RX ORDER — PREDNISONE 20 MG/1
40 TABLET ORAL DAILY
Qty: 10 TABLET | Refills: 0 | Status: SHIPPED | OUTPATIENT
Start: 2022-12-06 | End: 2022-12-11

## 2022-12-06 NOTE — PROGRESS NOTES
Name: Ricard Schirmer      : [id number]      MRN: [id number]  Encounter Provider: Meredith Thompson MD  Encounter Date: 2022   Encounter department: [address]     1  Pharyngitis, unspecified etiology  Assessment & Plan:  I reviewed with pt  ?related to viral illness? REC: pred 20mg bid x 5d  Recheck 1w if not improved, earlier if worse    Orders:  -     predniSONE 20 mg tablet; Take 2 tablets (40 mg total) by mouth daily for 5 days    2  Benign essential hypertension  Assessment & Plan:  Well controlled  Cont present treatment  Monitor labs  Recheck 6m             Subjective     f/u multiple med issues and for acute issue  - 2 day hx of sore throat with bilat ear congestion and occasional cough  Sore throat is severe, and not improved with NSAIDs or OTC throat lozenges  No nasal congestion, fever/chills, or bodyaches  - compliant with all meds  Not exercising as much  Denies CP, palpitations, lightheadedness or other CV symptoms with or without exertion  - no GI or  issues    Review of Systems   Constitutional: Negative  HENT: Positive for congestion and sore throat  Negative for ear discharge, ear pain, sinus pressure and sinus pain  Eyes: Negative  Respiratory: Negative  Cardiovascular: Negative  Gastrointestinal: Negative  Genitourinary: Negative  Skin: Negative          Past Medical History:   Diagnosis Date   • BPH (benign prostatic hyperplasia)    • Dyslipidemia    • Hypertension    • Impaired fasting glucose    • Osteoarthritis of both knees      Past Surgical History:   Procedure Laterality Date   • COLONOSCOPY  2020    tubular adenoma; Dr Danette Le     Family History   Problem Relation Age of Onset   • Hypertension Mother    • Kidney disease Mother    • No Known Problems Sister    • No Known Problems Brother    • No Known Problems Brother    • No Known Problems Son    • No Known Problems Son    • No Known Problems Daughter      Social History     Socioeconomic History   • Marital status: Unknown     Spouse name: None   • Number of children: None   • Years of education: None   • Highest education level: None   Occupational History   • Occupation: retired   Tobacco Use   • Smoking status: Never   • Smokeless tobacco: Never   Vaping Use   • Vaping Use: Never used   Substance and Sexual Activity   • Alcohol use: Yes     Comment: occasional   • Drug use: Never   • Sexual activity: Not Currently   Other Topics Concern   • None   Social History Narrative   • None     Social Determinants of Health     Financial Resource Strain: Not on file   Food Insecurity: Not on file   Transportation Needs: Not on file   Physical Activity: Not on file   Stress: Not on file   Social Connections: Not on file   Intimate Partner Violence: Not on file   Housing Stability: Not on file     Current Outpatient Medications on File Prior to Visit   Medication Sig   • enalapril (VASOTEC) 20 mg tablet Take 1 tablet (20 mg total) by mouth 2 (two) times a day   • hydrochlorothiazide (HYDRODIURIL) 25 mg tablet Take 1 tablet (25 mg total) by mouth daily   • [DISCONTINUED] tamsulosin (FLOMAX) 0 4 mg Take 1 capsule (0 4 mg total) by mouth daily with dinner     No Known Allergies  Immunization History   Administered Date(s) Administered   • COVID-19 MODERNA VACC 0 5 ML IM 03/09/2021, 04/06/2021   • Fluzone Split Quad 0 5 mL 12/03/2015, 10/12/2017   • INFLUENZA 12/03/2015, 10/12/2017   • Influenza, high dose seasonal 0 7 mL 10/28/2020, 10/07/2021   • Pneumococcal Polysaccharide PPV23 02/17/2020   • Tdap 11/20/2014   • Zoster 11/20/2014       Objective     /70   Pulse 104   Temp 98 °F (36 7 °C)   Ht 6' 2" (1 88 m)   Wt 130 kg (286 lb 9 6 oz)   SpO2 98%   BMI 36 80 kg/m²     Physical Exam  Vitals reviewed  Constitutional:       Appearance: Normal appearance  HENT:      Head: Normocephalic        Right Ear: Tympanic membrane, ear canal and external ear normal       Left Ear: Tympanic membrane, ear canal and external ear normal       Nose: No congestion  Mouth/Throat:      Mouth: Mucous membranes are moist       Pharynx: Posterior oropharyngeal erythema (mild posterior erythema without exudate) present  No oropharyngeal exudate  Eyes:      Extraocular Movements: Extraocular movements intact  Conjunctiva/sclera: Conjunctivae normal       Pupils: Pupils are equal, round, and reactive to light  Cardiovascular:      Rate and Rhythm: Normal rate and regular rhythm  Pulses: Normal pulses  Pulmonary:      Effort: Pulmonary effort is normal    Musculoskeletal:         General: No tenderness or deformity  Cervical back: No tenderness  Right lower leg: No edema  Left lower leg: No edema  Lymphadenopathy:      Cervical: No cervical adenopathy  Skin:     General: Skin is warm  Capillary Refill: Capillary refill takes less than 2 seconds  Neurological:      General: No focal deficit present  Mental Status: He is alert and oriented to person, place, and time         Jesus Ardon MD

## 2022-12-06 NOTE — ASSESSMENT & PLAN NOTE
I reviewed with pt  ?related to viral illness? REC: pred 20mg bid x 5d   Recheck 1w if not improved, earlier if worse

## 2022-12-19 ENCOUNTER — RA CDI HCC (OUTPATIENT)
Dept: OTHER | Facility: HOSPITAL | Age: 67
End: 2022-12-19

## 2022-12-19 NOTE — PROGRESS NOTES
[id number]  [facility]  coding opportunities          Chart Reviewed number of suggestions sent to Provider: 1     Patients Insurance     Medicare Insurance: [employer]

## 2022-12-28 ENCOUNTER — OFFICE VISIT (OUTPATIENT)
Dept: FAMILY MEDICINE CLINIC | Facility: CLINIC | Age: 67
End: 2022-12-28

## 2022-12-28 VITALS
BODY MASS INDEX: 36.7 KG/M2 | TEMPERATURE: 97.1 F | HEART RATE: 100 BPM | WEIGHT: 286 LBS | DIASTOLIC BLOOD PRESSURE: 74 MMHG | OXYGEN SATURATION: 97 % | RESPIRATION RATE: 17 BRPM | HEIGHT: 74 IN | SYSTOLIC BLOOD PRESSURE: 134 MMHG

## 2022-12-28 DIAGNOSIS — R73.01 IMPAIRED FASTING GLUCOSE: ICD-10-CM

## 2022-12-28 DIAGNOSIS — Z00.00 WELCOME TO MEDICARE PREVENTIVE VISIT: Primary | ICD-10-CM

## 2022-12-28 DIAGNOSIS — I10 BENIGN ESSENTIAL HYPERTENSION: ICD-10-CM

## 2022-12-28 DIAGNOSIS — N40.0 BENIGN PROSTATIC HYPERPLASIA, UNSPECIFIED WHETHER LOWER URINARY TRACT SYMPTOMS PRESENT: ICD-10-CM

## 2022-12-28 DIAGNOSIS — Z23 ENCOUNTER FOR IMMUNIZATION: ICD-10-CM

## 2022-12-28 DIAGNOSIS — M62.08 DIASTASIS RECTI: ICD-10-CM

## 2022-12-28 NOTE — PROGRESS NOTES
Assessment and Plan:     Problem List Items Addressed This Visit        Endocrine    Impaired fasting glucose     I reviewed with pt  Recent fasting glucose = 103  Continue to monitor diet  Recheck 6m            Cardiovascular and Mediastinum    Benign essential hypertension     Well controlled  Cont present treatment  Monitor labs  Recheck 6m              Musculoskeletal and Integument    Diastasis recti     With ?umbilical hernia  Pt asymptomatic  Continue to monitor  Consider surgery eval if discomfort occurs  Recheck 6m or prn            Genitourinary    BPH (benign prostatic hyperplasia)     Still with nocturia x 2-3 qHS  Discussed increasing flomax to 0 8mg qd - pt would like to wait and watch for now  Recheck 6m or prn        Other Visit Diagnoses     Welcome to Medicare preventive visit    -  Primary    Relevant Orders    POCT ECG (Completed)    ECG 12 lead    Encounter for immunization        Relevant Orders    ECG 12 lead    influenza vaccine, high-dose, PF 0 7 mL (FLUZONE HIGH-DOSE) (Completed)           Preventive health issues were discussed with patient, and age appropriate screening tests were ordered as noted in patient's After Visit Summary  Personalized health advice and appropriate referrals for health education or preventive services given if needed, as noted in patient's After Visit Summary  History of Present Illness:     Patient presents for a Medicare Wellness Visit    f/u multiple med issues and Welcome to Medicare  - pt doing well  Retired 3m ago  - pt was been active with projects but not waking as much  Pt denies CP, palpitations, lightheadedness or other CV symptoms with or without exertion     - pt up to date with ortho for knee OA     Had bilat knee injections 1m ago - feels well s/p injections  - pt denies any new GI or  issues  Still has nocturia x 2-3 despite taking Flomax 0 4mg qd  Due for repeat colonoscopy in 2025  - I reviewed recent labs with pt  Chol acceptable  Fasting   - Welcome to Medicare done        Patient Care Team:  Olesya Ward MD as PCP - General (Family Medicine)     Review of Systems:     Review of Systems   Constitutional: Negative  HENT: Negative  Eyes: Negative  Respiratory: Negative  Cardiovascular: Negative  Gastrointestinal: Negative  Endocrine: Negative  Genitourinary: Negative  Musculoskeletal: Positive for arthralgias and gait problem  Negative for back pain, joint swelling and myalgias  Skin: Negative  Allergic/Immunologic: Negative  Neurological: Negative for dizziness, weakness, light-headedness, numbness and headaches  Hematological: Negative  Psychiatric/Behavioral: Negative           Problem List:     Patient Active Problem List   Diagnosis   • Abnormal electromyogram   • Benign essential hypertension   • BPH (benign prostatic hyperplasia)   • DDD (degenerative disc disease), lumbosacral   • Diastasis recti   • Impaired fasting glucose   • Other male erectile dysfunction   • Low HDL (under 40)   • Snoring   • Spinal stenosis of lumbar region   • Bilateral primary osteoarthritis of knee      Past Medical and Surgical History:     Past Medical History:   Diagnosis Date   • BPH (benign prostatic hyperplasia)    • Dyslipidemia    • Hypertension    • Impaired fasting glucose    • Osteoarthritis of both knees      Past Surgical History:   Procedure Laterality Date   • COLONOSCOPY  11/23/2020    tubular adenoma; Dr Erik Li      Family History:     Family History   Problem Relation Age of Onset   • Hypertension Mother    • Kidney disease Mother    • No Known Problems Sister    • No Known Problems Brother    • No Known Problems Brother    • No Known Problems Son    • No Known Problems Son    • No Known Problems Daughter       Social History:     Social History     Socioeconomic History   • Marital status: Unknown     Spouse name: None   • Number of children: None   • Years of education: None   • Highest education level: None   Occupational History   • Occupation: retired   Tobacco Use   • Smoking status: Never   • Smokeless tobacco: Never   Vaping Use   • Vaping Use: Never used   Substance and Sexual Activity   • Alcohol use: Yes     Comment: occasional   • Drug use: Never   • Sexual activity: Not Currently   Other Topics Concern   • None   Social History Narrative   • None     Social Determinants of Health     Financial Resource Strain: Medium Risk   • Difficulty of Paying Living Expenses: Somewhat hard   Food Insecurity: Not on file   Transportation Needs: No Transportation Needs   • Lack of Transportation (Medical): No   • Lack of Transportation (Non-Medical): No   Physical Activity: Not on file   Stress: Not on file   Social Connections: Not on file   Intimate Partner Violence: Not on file   Housing Stability: Not on file      Medications and Allergies:     Current Outpatient Medications   Medication Sig Dispense Refill   • enalapril (VASOTEC) 20 mg tablet Take 1 tablet (20 mg total) by mouth 2 (two) times a day 180 tablet 1   • hydrochlorothiazide (HYDRODIURIL) 25 mg tablet Take 1 tablet (25 mg total) by mouth daily 90 tablet 1   • tamsulosin (FLOMAX) 0 4 mg Take 1 capsule (0 4 mg total) by mouth daily with dinner 90 capsule 1     No current facility-administered medications for this visit       No Known Allergies   Immunizations:     Immunization History   Administered Date(s) Administered   • COVID-19 MODERNA VACC 0 5 ML IM 03/09/2021, 04/06/2021   • Fluzone Split Quad 0 5 mL 12/03/2015, 10/12/2017   • INFLUENZA 12/03/2015, 10/12/2017, 10/07/2021   • Influenza, high dose seasonal 0 7 mL 10/28/2020, 10/07/2021, 12/28/2022   • Pneumococcal Polysaccharide PPV23 02/17/2020   • Tdap 11/20/2014   • Zoster 11/20/2014      Health Maintenance:         Topic Date Due   • Colorectal Cancer Screening  11/23/2025   • Hepatitis C Screening  Completed         Topic Date Due   • Hepatitis B Vaccine (1 of 3 - 3-dose series) Never done   • Pneumococcal Vaccine: 65+ Years (2 - PCV) 02/17/2021   • COVID-19 Vaccine (3 - Booster for Moderna series) 06/01/2021      Medicare Screening Tests and Risk Assessments:     Glen Castro is here for his Welcome to Medicare visit  Last Medicare Wellness visit information reviewed, patient interviewed and updates made to the record today  Health Risk Assessment:   Patient rates overall health as good  Patient feels that their physical health rating is same  Patient is satisfied with their life  Eyesight was rated as same  Hearing was rated as same  Patient feels that their emotional and mental health rating is same  Patients states they are never, rarely angry  Patient states they are sometimes unusually tired/fatigued  Pain experienced in the last 7 days has been none  Patient states that he has experienced no weight loss or gain in last 6 months  Depression Screening:   PHQ-2 Score: 0      Fall Risk Screening: In the past year, patient has experienced: no history of falling in past year      Home Safety:  Patient does not have trouble with stairs inside or outside of their home  Patient has working smoke alarms and has no working carbon monoxide detector  Home safety hazards include: none  Nutrition:   Current diet is Regular  Medications:   Patient is currently taking over-the-counter supplements  OTC medications include: see medication list  Patient is able to manage medications  Activities of Daily Living (ADLs)/Instrumental Activities of Daily Living (IADLs):   Walk and transfer into and out of bed and chair?: Yes  Dress and groom yourself?: Yes    Bathe or shower yourself?: Yes    Feed yourself?  Yes  Do your laundry/housekeeping?: Yes  Manage your money, pay your bills and track your expenses?: Yes  Make your own meals?: Yes    Do your own shopping?: Yes    Previous Hospitalizations:   Any hospitalizations or ED visits within the last 12 months?: No      Advance Care Planning:   Living will: Yes    Durable POA for healthcare: Yes    Advanced directive: Yes    Advanced directive counseling given: Yes      Cognitive Screening:   Provider or family/friend/caregiver concerned regarding cognition?: No    PREVENTIVE SCREENINGS      Cardiovascular Screening:    General: Screening Current      Diabetes Screening:     General: Risks and Benefits Discussed    Due for: Blood Glucose      Colorectal Cancer Screening:     General: Screening Current      Prostate Cancer Screening:    General: Patient Declines      Osteoporosis Screening:    General: Patient Declines      Abdominal Aortic Aneurysm (AAA) Screening:    Risk factors include: age between 73-69 yo        Lung Cancer Screening:     General: Screening Not Indicated      Hepatitis C Screening:    General: Screening Current    Screening, Brief Intervention, and Referral to Treatment (SBIRT)    Screening      AUDIT-C Screenin) How often did you have a drink containing alcohol in the past year? monthly or less  2) How many drinks did you have on a typical day when you were drinking in the past year? 1 to 2  3) How often did you have 6 or more drinks on one occasion in the past year? never    AUDIT-C Score: 1  Interpretation: Score 0-3 (male): Negative screen for alcohol misuse    Single Item Drug Screening:  How often have you used an illegal drug (including marijuana) or a prescription medication for non-medical reasons in the past year? never    Single Item Drug Screen Score: 0  Interpretation: Negative screen for possible drug use disorder    Other Counseling Topics:   Calcium and vitamin D intake and regular weightbearing exercise       Vision Screening    Right eye Left eye Both eyes   Without correction      With correction 20/25 20/30 20/25        Physical Exam:     /74 (BP Location: Left arm, Patient Position: Sitting, Cuff Size: Large)   Pulse 100   Temp (!) 97 1 °F (36 2 °C) (Temporal)   Resp 17   Ht 6' 2" (1 88 m)   Wt 130 kg (286 lb)   SpO2 97%   BMI 36 72 kg/m²     Physical Exam  Vitals reviewed  Constitutional:       Appearance: He is well-developed  HENT:      Head: Normocephalic and atraumatic  Right Ear: Tympanic membrane, ear canal and external ear normal       Left Ear: Ear canal and external ear normal       Mouth/Throat:      Mouth: Mucous membranes are moist    Eyes:      Extraocular Movements: Extraocular movements intact  Conjunctiva/sclera: Conjunctivae normal       Pupils: Pupils are equal, round, and reactive to light  Neck:      Thyroid: No thyromegaly  Vascular: No JVD  Cardiovascular:      Rate and Rhythm: Normal rate and regular rhythm  Pulses: Normal pulses  Heart sounds: Normal heart sounds  No murmur heard  Pulmonary:      Effort: Pulmonary effort is normal  No respiratory distress  Breath sounds: Normal breath sounds  No wheezing or rales  Abdominal:      General: Bowel sounds are normal  There is no distension  Palpations: Abdomen is soft  There is no mass  Tenderness: There is no abdominal tenderness  Hernia: A hernia is present  Comments: Diastasis recti with ?umbilical hernia? Musculoskeletal:         General: Deformity (bilat varus deformities of the knees  ) present  No swelling or tenderness  Normal range of motion  Cervical back: Normal range of motion and neck supple  No tenderness  No muscular tenderness  Right lower leg: No edema  Left lower leg: No edema  Lymphadenopathy:      Cervical: No cervical adenopathy  Skin:     General: Skin is warm  Capillary Refill: Capillary refill takes less than 2 seconds  Neurological:      Mental Status: He is alert and oriented to person, place, and time  Cranial Nerves: No cranial nerve deficit  Sensory: No sensory deficit  Motor: No weakness or abnormal muscle tone  Coordination: Coordination normal (normal Romberg)        Gait: Gait abnormal (mildly antalgic appearing gait)  Deep Tendon Reflexes: Reflexes normal       Comments: minicog 5/5   Psychiatric:         Mood and Affect: Mood normal          Behavior: Behavior normal          Thought Content:  Thought content normal          Judgment: Judgment normal       Comments: PHQ-2/9 Depression Screening    Little interest or pleasure in doing things: 0 - not at all  Feeling down, depressed, or hopeless: 0 - not at all  PHQ-2 Score: 0  PHQ-2 Interpretation: Negative depression screen               Salvador Dawson MD

## 2022-12-28 NOTE — PATIENT INSTRUCTIONS
Medicare Preventive Visit Patient Instructions  Thank you for completing your Welcome to Medicare Visit or Medicare Annual Wellness Visit today  Your next wellness visit will be due in one year (12/29/2023)  The screening/preventive services that you may require over the next 5-10 years are detailed below  Some tests may not apply to you based off risk factors and/or age  Screening tests ordered at today's visit but not completed yet may show as past due  Also, please note that scanned in results may not display below  Preventive Screenings:  Service Recommendations Previous Testing/Comments   Colorectal Cancer Screening  · Colonoscopy    · Fecal Occult Blood Test (FOBT)/Fecal Immunochemical Test (FIT)  · Fecal DNA/Cologuard Test  · Flexible Sigmoidoscopy Age: 39-70 years old   Colonoscopy: every 10 years (May be performed more frequently if at higher risk)  OR  FOBT/FIT: every 1 year  OR  Cologuard: every 3 years  OR  Sigmoidoscopy: every 5 years  Screening may be recommended earlier than age 39 if at higher risk for colorectal cancer  Also, an individualized decision between you and your healthcare provider will decide whether screening between the ages of 74-80 would be appropriate   Colonoscopy: 11/23/2020  FOBT/FIT: Not on file  Cologuard: Not on file  Sigmoidoscopy: Not on file    Screening Current     Prostate Cancer Screening Individualized decision between patient and health care provider in men between ages of 53-78   Medicare will cover every 12 months beginning on the day after your 50th birthday PSA: 0 7 ng/mL           Hepatitis C Screening Once for adults born between 1945 and 1965  More frequently in patients at high risk for Hepatitis C Hep C Antibody: 09/08/2021    Screening Current   Diabetes Screening 1-2 times per year if you're at risk for diabetes or have pre-diabetes Fasting glucose: 103 mg/dL (9/8/2021)  A1C: 5 5 % (9/8/2021)      Cholesterol Screening Once every 5 years if you don't have a lipid disorder  May order more often based on risk factors  Lipid panel: 12/01/2022  Screening Current      Other Preventive Screenings Covered by Medicare:  1  Abdominal Aortic Aneurysm (AAA) Screening: covered once if your at risk  You're considered to be at risk if you have a family history of AAA or a male between the age of 73-68 who smoking at least 100 cigarettes in your lifetime  2  Lung Cancer Screening: covers low dose CT scan once per year if you meet all of the following conditions: (1) Age 50-69; (2) No signs or symptoms of lung cancer; (3) Current smoker or have quit smoking within the last 15 years; (4) You have a tobacco smoking history of at least 20 pack years (packs per day x number of years you smoked); (5) You get a written order from a healthcare provider  3  Glaucoma Screening: covered annually if you're considered high risk: (1) You have diabetes OR (2) Family history of glaucoma OR (3)  aged 48 and older OR (3)  American aged 72 and older  3  Osteoporosis Screening: covered every 2 years if you meet one of the following conditions: (1) Have a vertebral abnormality; (2) On glucocorticoid therapy for more than 3 months; (3) Have primary hyperparathyroidism; (4) On osteoporosis medications and need to assess response to drug therapy  5  HIV Screening: covered annually if you're between the age of 12-76  Also covered annually if you are younger than 13 and older than 72 with risk factors for HIV infection  For pregnant patients, it is covered up to 3 times per pregnancy      Immunizations:  Immunization Recommendations   Influenza Vaccine Annual influenza vaccination during flu season is recommended for all persons aged >= 6 months who do not have contraindications   Pneumococcal Vaccine   * Pneumococcal conjugate vaccine = PCV13 (Prevnar 13), PCV15 (Vaxneuvance), PCV20 (Prevnar 20)  * Pneumococcal polysaccharide vaccine = PPSV23 (Pneumovax) Adults 25-60 years old: 1-3 doses may be recommended based on certain risk factors  Adults 72 years old: 1-2 doses may be recommended based off what pneumonia vaccine you previously received   Hepatitis B Vaccine 3 dose series if at intermediate or high risk (ex: diabetes, end stage renal disease, liver disease)   Tetanus (Td) Vaccine - COST NOT COVERED BY MEDICARE PART B Following completion of primary series, a booster dose should be given every 10 years to maintain immunity against tetanus  Td may also be given as tetanus wound prophylaxis  Tdap Vaccine - COST NOT COVERED BY MEDICARE PART B Recommended at least once for all adults  For pregnant patients, recommended with each pregnancy  Shingles Vaccine (Shingrix) - COST NOT COVERED BY MEDICARE PART B  2 shot series recommended in those aged 48 and above     Health Maintenance Due:      Topic Date Due   • Colorectal Cancer Screening  11/23/2025   • Hepatitis C Screening  Completed     Immunizations Due:      Topic Date Due   • Hepatitis B Vaccine (1 of 3 - 3-dose series) Never done   • Pneumococcal Vaccine: 65+ Years (2 - PCV) 02/17/2021   • COVID-19 Vaccine (3 - Booster for Moderna series) 06/01/2021   • Influenza Vaccine (1) 09/01/2022     Advance Directives   What are advance directives? Advance directives are legal documents that state your wishes and plans for medical care  These plans are made ahead of time in case you lose your ability to make decisions for yourself  Advance directives can apply to any medical decision, such as the treatments you want, and if you want to donate organs  What are the types of advance directives? There are many types of advance directives, and each state has rules about how to use them  You may choose a combination of any of the following:  · Living will: This is a written record of the treatment you want  You can also choose which treatments you do not want, which to limit, and which to stop at a certain time   This includes surgery, medicine, IV fluid, and tube feedings  · Durable power of  for healthcare [facility]): This is a written record that states who you want to make healthcare choices for you when you are unable to make them for yourself  This person, called a proxy, is usually a family member or a friend  You may choose more than 1 proxy  · Do not resuscitate (DNR) order:  A DNR order is used in case your heart stops beating or you stop breathing  It is a request not to have certain forms of treatment, such as CPR  A DNR order may be included in other types of advance directives  · Medical directive: This covers the care that you want if you are in a coma, near death, or unable to make decisions for yourself  You can list the treatments you want for each condition  Treatment may include pain medicine, surgery, blood transfusions, dialysis, IV or tube feedings, and a ventilator (breathing machine)  · Values history: This document has questions about your views, beliefs, and how you feel and think about life  This information can help others choose the care that you would choose  Why are advance directives important? An advance directive helps you control your care  Although spoken wishes may be used, it is better to have your wishes written down  Spoken wishes can be misunderstood, or not followed  Treatments may be given even if you do not want them  An advance directive may make it easier for your family to make difficult choices about your care  Weight Management   Why it is important to manage your weight:  Being overweight increases your risk of health conditions such as heart disease, high blood pressure, type 2 diabetes, and certain types of cancer  It can also increase your risk for osteoarthritis, sleep apnea, and other respiratory problems  Aim for a slow, steady weight loss  Even a small amount of weight loss can lower your risk of health problems    How to lose weight safely:  A safe and healthy way to lose weight is to eat fewer calories and get regular exercise  You can lose up about 1 pound a week by decreasing the number of calories you eat by 500 calories each day  Healthy meal plan for weight management:  A healthy meal plan includes a variety of foods, contains fewer calories, and helps you stay healthy  A healthy meal plan includes the following:  · Eat whole-grain foods more often  A healthy meal plan should contain fiber  Fiber is the part of grains, fruits, and vegetables that is not broken down by your body  Whole-grain foods are healthy and provide extra fiber in your diet  Some examples of whole-grain foods are whole-wheat breads and pastas, oatmeal, brown rice, and bulgur  · Eat a variety of vegetables every day  Include dark, leafy greens such as spinach, kale, ivy greens, and mustard greens  Eat yellow and orange vegetables such as carrots, sweet potatoes, and winter squash  · Eat a variety of fruits every day  Choose fresh or canned fruit (canned in its own juice or light syrup) instead of juice  Fruit juice has very little or no fiber  · Eat low-fat dairy foods  Drink fat-free (skim) milk or 1% milk  Eat fat-free yogurt and low-fat cottage cheese  Try low-fat cheeses such as mozzarella and other reduced-fat cheeses  · Choose meat and other protein foods that are low in fat  Choose beans or other legumes such as split peas or lentils  Choose fish, skinless poultry (chicken or turkey), or lean cuts of red meat (beef or pork)  Before you cook meat or poultry, cut off any visible fat  · Use less fat and oil  Try baking foods instead of frying them  Add less fat, such as margarine, sour cream, regular salad dressing and mayonnaise to foods  Eat fewer high-fat foods  Some examples of high-fat foods include french fries, doughnuts, ice cream, and cakes  · Eat fewer sweets  Limit foods and drinks that are high in sugar   This includes candy, cookies, regular soda, and sweetened drinks  Exercise:  Exercise at least 30 minutes per day on most days of the week  Some examples of exercise include walking, biking, dancing, and swimming  You can also fit in more physical activity by taking the stairs instead of the elevator or parking farther away from stores  Ask your healthcare provider about the best exercise plan for you  © Copyright [address] 2018 Information is for End User's use only and may not be sold, redistributed or otherwise used for commercial purposes  All illustrations and images included in CareNotes® are the copyrighted property of [employer]  or [facility] Preventive Visit Patient Instructions  Thank you for completing your Welcome to Medicare Visit or Medicare Annual Wellness Visit today  Your next wellness visit will be due in one year (12/29/2023)  The screening/preventive services that you may require over the next 5-10 years are detailed below  Some tests may not apply to you based off risk factors and/or age  Screening tests ordered at today's visit but not completed yet may show as past due  Also, please note that scanned in results may not display below  Preventive Screenings:  Service Recommendations Previous Testing/Comments   Colorectal Cancer Screening  · Colonoscopy    · Fecal Occult Blood Test (FOBT)/Fecal Immunochemical Test (FIT)  · Fecal DNA/Cologuard Test  · Flexible Sigmoidoscopy Age: 39-70 years old   Colonoscopy: every 10 years (May be performed more frequently if at higher risk)  OR  FOBT/FIT: every 1 year  OR  Cologuard: every 3 years  OR  Sigmoidoscopy: every 5 years  Screening may be recommended earlier than age 39 if at higher risk for colorectal cancer  Also, an individualized decision between you and your healthcare provider will decide whether screening between the ages of 74-80 would be appropriate   Colonoscopy: 11/23/2020  FOBT/FIT: Not on file  Cologuard: Not on file  Sigmoidoscopy: Not on file    Screening Current     Prostate Cancer Screening Individualized decision between patient and health care provider in men between ages of 53-78   Medicare will cover every 12 months beginning on the day after your 50th birthday PSA: 0 7 ng/mL           Hepatitis C Screening Once for adults born between 1945 and 1965  More frequently in patients at high risk for Hepatitis C Hep C Antibody: 09/08/2021    Screening Current   Diabetes Screening 1-2 times per year if you're at risk for diabetes or have pre-diabetes Fasting glucose: 103 mg/dL (9/8/2021)  A1C: 5 5 % (9/8/2021)      Cholesterol Screening Once every 5 years if you don't have a lipid disorder  May order more often based on risk factors  Lipid panel: 12/01/2022  Screening Current      Other Preventive Screenings Covered by Medicare:  6  Abdominal Aortic Aneurysm (AAA) Screening: covered once if your at risk  You're considered to be at risk if you have a family history of AAA or a male between the age of 73-68 who smoking at least 100 cigarettes in your lifetime  7  Lung Cancer Screening: covers low dose CT scan once per year if you meet all of the following conditions: (1) Age 50-69; (2) No signs or symptoms of lung cancer; (3) Current smoker or have quit smoking within the last 15 years; (4) You have a tobacco smoking history of at least 20 pack years (packs per day x number of years you smoked); (5) You get a written order from a healthcare provider  8  Glaucoma Screening: covered annually if you're considered high risk: (1) You have diabetes OR (2) Family history of glaucoma OR (3)  aged 48 and older OR (3)  American aged 72 and older  5  Osteoporosis Screening: covered every 2 years if you meet one of the following conditions: (1) Have a vertebral abnormality; (2) On glucocorticoid therapy for more than 3 months; (3) Have primary hyperparathyroidism; (4) On osteoporosis medications and need to assess response to drug therapy    10  HIV Screening: covered annually if you're between the age of 15-65  Also covered annually if you are younger than 13 and older than 72 with risk factors for HIV infection  For pregnant patients, it is covered up to 3 times per pregnancy  Immunizations:  Immunization Recommendations   Influenza Vaccine Annual influenza vaccination during flu season is recommended for all persons aged >= 6 months who do not have contraindications   Pneumococcal Vaccine   * Pneumococcal conjugate vaccine = PCV13 (Prevnar 13), PCV15 (Vaxneuvance), PCV20 (Prevnar 20)  * Pneumococcal polysaccharide vaccine = PPSV23 (Pneumovax) Adults 25-60 years old: 1-3 doses may be recommended based on certain risk factors  Adults 72 years old: 1-2 doses may be recommended based off what pneumonia vaccine you previously received   Hepatitis B Vaccine 3 dose series if at intermediate or high risk (ex: diabetes, end stage renal disease, liver disease)   Tetanus (Td) Vaccine - COST NOT COVERED BY MEDICARE PART B Following completion of primary series, a booster dose should be given every 10 years to maintain immunity against tetanus  Td may also be given as tetanus wound prophylaxis  Tdap Vaccine - COST NOT COVERED BY MEDICARE PART B Recommended at least once for all adults  For pregnant patients, recommended with each pregnancy  Shingles Vaccine (Shingrix) - COST NOT COVERED BY MEDICARE PART B  2 shot series recommended in those aged 48 and above     Health Maintenance Due:      Topic Date Due   • Colorectal Cancer Screening  11/23/2025   • Hepatitis C Screening  Completed     Immunizations Due:      Topic Date Due   • Hepatitis B Vaccine (1 of 3 - 3-dose series) Never done   • Pneumococcal Vaccine: 65+ Years (2 - PCV) 02/17/2021   • COVID-19 Vaccine (3 - Booster for Moderna series) 06/01/2021   • Influenza Vaccine (1) 09/01/2022     Advance Directives   What are advance directives?   Advance directives are legal documents that state your wishes and plans for medical care  These plans are made ahead of time in case you lose your ability to make decisions for yourself  Advance directives can apply to any medical decision, such as the treatments you want, and if you want to donate organs  What are the types of advance directives? There are many types of advance directives, and each state has rules about how to use them  You may choose a combination of any of the following:  · Living will: This is a written record of the treatment you want  You can also choose which treatments you do not want, which to limit, and which to stop at a certain time  This includes surgery, medicine, IV fluid, and tube feedings  · Durable power of  for healthcare [place] [facility]): This is a written record that states who you want to make healthcare choices for you when you are unable to make them for yourself  This person, called a proxy, is usually a family member or a friend  You may choose more than 1 proxy  · Do not resuscitate (DNR) order:  A DNR order is used in case your heart stops beating or you stop breathing  It is a request not to have certain forms of treatment, such as CPR  A DNR order may be included in other types of advance directives  · Medical directive: This covers the care that you want if you are in a coma, near death, or unable to make decisions for yourself  You can list the treatments you want for each condition  Treatment may include pain medicine, surgery, blood transfusions, dialysis, IV or tube feedings, and a ventilator (breathing machine)  · Values history: This document has questions about your views, beliefs, and how you feel and think about life  This information can help others choose the care that you would choose  Why are advance directives important? An advance directive helps you control your care  Although spoken wishes may be used, it is better to have your wishes written down  Spoken wishes can be misunderstood, or not followed  Treatments may be given even if you do not want them  An advance directive may make it easier for your family to make difficult choices about your care  Weight Management   Why it is important to manage your weight:  Being overweight increases your risk of health conditions such as heart disease, high blood pressure, type 2 diabetes, and certain types of cancer  It can also increase your risk for osteoarthritis, sleep apnea, and other respiratory problems  Aim for a slow, steady weight loss  Even a small amount of weight loss can lower your risk of health problems  How to lose weight safely:  A safe and healthy way to lose weight is to eat fewer calories and get regular exercise  You can lose up about 1 pound a week by decreasing the number of calories you eat by 500 calories each day  Healthy meal plan for weight management:  A healthy meal plan includes a variety of foods, contains fewer calories, and helps you stay healthy  A healthy meal plan includes the following:  · Eat whole-grain foods more often  A healthy meal plan should contain fiber  Fiber is the part of grains, fruits, and vegetables that is not broken down by your body  Whole-grain foods are healthy and provide extra fiber in your diet  Some examples of whole-grain foods are whole-wheat breads and pastas, oatmeal, brown rice, and bulgur  · Eat a variety of vegetables every day  Include dark, leafy greens such as spinach, kale, ivy greens, and mustard greens  Eat yellow and orange vegetables such as carrots, sweet potatoes, and winter squash  · Eat a variety of fruits every day  Choose fresh or canned fruit (canned in its own juice or light syrup) instead of juice  Fruit juice has very little or no fiber  · Eat low-fat dairy foods  Drink fat-free (skim) milk or 1% milk  Eat fat-free yogurt and low-fat cottage cheese  Try low-fat cheeses such as mozzarella and other reduced-fat cheeses    · Choose meat and other protein foods that are low in fat  Choose beans or other legumes such as split peas or lentils  Choose fish, skinless poultry (chicken or turkey), or lean cuts of red meat (beef or pork)  Before you cook meat or poultry, cut off any visible fat  · Use less fat and oil  Try baking foods instead of frying them  Add less fat, such as margarine, sour cream, regular salad dressing and mayonnaise to foods  Eat fewer high-fat foods  Some examples of high-fat foods include french fries, doughnuts, ice cream, and cakes  · Eat fewer sweets  Limit foods and drinks that are high in sugar  This includes candy, cookies, regular soda, and sweetened drinks  Exercise:  Exercise at least 30 minutes per day on most days of the week  Some examples of exercise include walking, biking, dancing, and swimming  You can also fit in more physical activity by taking the stairs instead of the elevator or parking farther away from stores  Ask your healthcare provider about the best exercise plan for you  © Copyright [employer] 2018 Information is for End User's use only and may not be sold, redistributed or otherwise used for commercial purposes   All illustrations and images included in CareNotes® are the copyrighted property of [employer] M [employer]  or [address] [place] [address] [facility]

## 2022-12-29 PROBLEM — J02.9 PHARYNGITIS: Status: RESOLVED | Noted: 2022-12-06 | Resolved: 2022-12-29

## 2022-12-29 NOTE — ASSESSMENT & PLAN NOTE
With ?umbilical hernia  Pt asymptomatic  Continue to monitor  Consider surgery eval if discomfort occurs   Recheck 6m or prn

## 2022-12-29 NOTE — ASSESSMENT & PLAN NOTE
Still with nocturia x 2-3 qHS  Discussed increasing flomax to 0 8mg qd - pt would like to wait and watch for now   Recheck 6m or prn

## 2023-01-11 DIAGNOSIS — I10 BENIGN ESSENTIAL HYPERTENSION: ICD-10-CM

## 2023-01-13 RX ORDER — HYDROCHLOROTHIAZIDE 25 MG/1
TABLET ORAL
Qty: 90 TABLET | Refills: 1 | Status: SHIPPED | OUTPATIENT
Start: 2023-01-13

## 2023-03-08 ENCOUNTER — TELEPHONE (OUTPATIENT)
Dept: FAMILY MEDICINE CLINIC | Facility: CLINIC | Age: 68
End: 2023-03-08

## 2023-03-08 NOTE — TELEPHONE ENCOUNTER
Patient states insurance is requesting that visit states AWV otherwise it will not be covered  Eye exam needs to go through [facility]      Pt # [phone]

## 2023-03-24 DIAGNOSIS — I10 BENIGN ESSENTIAL HYPERTENSION: ICD-10-CM

## 2023-03-27 RX ORDER — ENALAPRIL MALEATE 20 MG/1
TABLET ORAL
Qty: 180 TABLET | Refills: 1 | Status: SHIPPED | OUTPATIENT
Start: 2023-03-27

## 2023-04-03 NOTE — TELEPHONE ENCOUNTER
Attempts to reach to reschedule 3/30 (1mo ) POV.  Msg left and LW msg sent today by writer to contact office for brief assessment and rescheduling.     Called pt to advise he can take two tabs 40 mg at one time instead of twice daily but they do not make a 40 mg   L/M  Raymond Smalls

## 2023-06-12 DIAGNOSIS — N40.0 BENIGN PROSTATIC HYPERPLASIA, UNSPECIFIED WHETHER LOWER URINARY TRACT SYMPTOMS PRESENT: ICD-10-CM

## 2023-06-12 RX ORDER — TAMSULOSIN HYDROCHLORIDE 0.4 MG/1
0.4 CAPSULE ORAL
Qty: 90 CAPSULE | Refills: 1 | Status: SHIPPED | OUTPATIENT
Start: 2023-06-12

## 2023-06-12 NOTE — TELEPHONE ENCOUNTER
msg left on VM  No, this is [employer]  And then I have an appointment for July something or other  Do I have to do any blood work or anything for that? If you could call me back, I'd appreciate it, [phone]  Thank you

## 2023-06-14 DIAGNOSIS — Z12.5 SCREENING FOR PROSTATE CANCER: ICD-10-CM

## 2023-06-14 DIAGNOSIS — I10 BENIGN ESSENTIAL HYPERTENSION: Primary | ICD-10-CM

## 2023-06-15 ENCOUNTER — APPOINTMENT (OUTPATIENT)
Dept: LAB | Facility: CLINIC | Age: 68
End: 2023-06-15
Payer: MEDICARE

## 2023-06-15 DIAGNOSIS — I10 BENIGN ESSENTIAL HYPERTENSION: ICD-10-CM

## 2023-06-15 DIAGNOSIS — Z12.5 SCREENING FOR PROSTATE CANCER: ICD-10-CM

## 2023-06-15 LAB
BASOPHILS # BLD AUTO: 0.04 THOUSANDS/ÂΜL (ref 0–0.1)
BASOPHILS NFR BLD AUTO: 1 % (ref 0–1)
EOSINOPHIL # BLD AUTO: 0.17 THOUSAND/ÂΜL (ref 0–0.61)
EOSINOPHIL NFR BLD AUTO: 2 % (ref 0–6)
ERYTHROCYTE [DISTWIDTH] IN BLOOD BY AUTOMATED COUNT: 12.5 % (ref 11.6–15.1)
HCT VFR BLD AUTO: 41.5 % (ref 36.5–49.3)
HGB BLD-MCNC: 14.3 G/DL (ref 12–17)
IMM GRANULOCYTES # BLD AUTO: 0.04 THOUSAND/UL (ref 0–0.2)
IMM GRANULOCYTES NFR BLD AUTO: 1 % (ref 0–2)
LYMPHOCYTES # BLD AUTO: 2.13 THOUSANDS/ÂΜL (ref 0.6–4.47)
LYMPHOCYTES NFR BLD AUTO: 25 % (ref 14–44)
MCH RBC QN AUTO: 31.4 PG (ref 26.8–34.3)
MCHC RBC AUTO-ENTMCNC: 34.5 G/DL (ref 31.4–37.4)
MCV RBC AUTO: 91 FL (ref 82–98)
MONOCYTES # BLD AUTO: 0.81 THOUSAND/ÂΜL (ref 0.17–1.22)
MONOCYTES NFR BLD AUTO: 10 % (ref 4–12)
NEUTROPHILS # BLD AUTO: 5.35 THOUSANDS/ÂΜL (ref 1.85–7.62)
NEUTS SEG NFR BLD AUTO: 61 % (ref 43–75)
NRBC BLD AUTO-RTO: 0 /100 WBCS
PLATELET # BLD AUTO: 239 THOUSANDS/UL (ref 149–390)
PMV BLD AUTO: 9.7 FL (ref 8.9–12.7)
PSA SERPL-MCNC: 0.92 NG/ML (ref 0–4)
RBC # BLD AUTO: 4.56 MILLION/UL (ref 3.88–5.62)
WBC # BLD AUTO: 8.54 THOUSAND/UL (ref 4.31–10.16)

## 2023-06-15 PROCEDURE — 85025 COMPLETE CBC W/AUTO DIFF WBC: CPT

## 2023-06-15 PROCEDURE — G0103 PSA SCREENING: HCPCS

## 2023-06-15 PROCEDURE — 36415 COLL VENOUS BLD VENIPUNCTURE: CPT

## 2023-06-16 ENCOUNTER — APPOINTMENT (OUTPATIENT)
Dept: LAB | Facility: CLINIC | Age: 68
End: 2023-06-16
Payer: MEDICARE

## 2023-06-16 DIAGNOSIS — I10 BENIGN ESSENTIAL HYPERTENSION: ICD-10-CM

## 2023-06-16 LAB
ALBUMIN SERPL BCP-MCNC: 3.4 G/DL (ref 3.5–5)
ALP SERPL-CCNC: 44 U/L (ref 46–116)
ALT SERPL W P-5'-P-CCNC: 39 U/L (ref 12–78)
ANION GAP SERPL CALCULATED.3IONS-SCNC: 4 MMOL/L (ref 4–13)
AST SERPL W P-5'-P-CCNC: 13 U/L (ref 5–45)
BILIRUB SERPL-MCNC: 0.47 MG/DL (ref 0.2–1)
BUN SERPL-MCNC: 26 MG/DL (ref 5–25)
CALCIUM ALBUM COR SERPL-MCNC: 9.8 MG/DL (ref 8.3–10.1)
CALCIUM SERPL-MCNC: 9.3 MG/DL (ref 8.3–10.1)
CHLORIDE SERPL-SCNC: 108 MMOL/L (ref 96–108)
CHOLEST SERPL-MCNC: 165 MG/DL
CO2 SERPL-SCNC: 25 MMOL/L (ref 21–32)
CREAT SERPL-MCNC: 0.96 MG/DL (ref 0.6–1.3)
GFR SERPL CREATININE-BSD FRML MDRD: 80 ML/MIN/1.73SQ M
GLUCOSE P FAST SERPL-MCNC: 111 MG/DL (ref 65–99)
HDLC SERPL-MCNC: 42 MG/DL
LDLC SERPL CALC-MCNC: 101 MG/DL (ref 0–100)
NONHDLC SERPL-MCNC: 123 MG/DL
POTASSIUM SERPL-SCNC: 4.2 MMOL/L (ref 3.5–5.3)
PROT SERPL-MCNC: 7.1 G/DL (ref 6.4–8.4)
SODIUM SERPL-SCNC: 137 MMOL/L (ref 135–147)
TRIGL SERPL-MCNC: 111 MG/DL

## 2023-06-16 PROCEDURE — 80061 LIPID PANEL: CPT

## 2023-06-16 PROCEDURE — 80053 COMPREHEN METABOLIC PANEL: CPT

## 2023-06-16 PROCEDURE — 36415 COLL VENOUS BLD VENIPUNCTURE: CPT

## 2023-06-16 RX ORDER — HYDROCHLOROTHIAZIDE 25 MG/1
TABLET ORAL
Qty: 90 TABLET | Refills: 1 | Status: SHIPPED | OUTPATIENT
Start: 2023-06-16

## 2023-07-11 ENCOUNTER — OFFICE VISIT (OUTPATIENT)
Dept: FAMILY MEDICINE CLINIC | Facility: CLINIC | Age: 68
End: 2023-07-11
Payer: MEDICARE

## 2023-07-11 VITALS
BODY MASS INDEX: 36.32 KG/M2 | HEART RATE: 81 BPM | WEIGHT: 283 LBS | HEIGHT: 74 IN | OXYGEN SATURATION: 98 % | SYSTOLIC BLOOD PRESSURE: 120 MMHG | TEMPERATURE: 97.9 F | DIASTOLIC BLOOD PRESSURE: 80 MMHG

## 2023-07-11 DIAGNOSIS — N40.0 BENIGN PROSTATIC HYPERPLASIA, UNSPECIFIED WHETHER LOWER URINARY TRACT SYMPTOMS PRESENT: ICD-10-CM

## 2023-07-11 DIAGNOSIS — I10 BENIGN ESSENTIAL HYPERTENSION: ICD-10-CM

## 2023-07-11 DIAGNOSIS — R73.01 IMPAIRED FASTING GLUCOSE: Primary | ICD-10-CM

## 2023-07-11 PROCEDURE — 99214 OFFICE O/P EST MOD 30 MIN: CPT | Performed by: FAMILY MEDICINE

## 2023-07-11 NOTE — PROGRESS NOTES
Name: Giuseppe Perrin      :       MRN: [id number]  Encounter Provider: Renae Friedman MD  Encounter Date: 2023   Encounter department: [facility]     1. Impaired fasting glucose  Assessment & Plan:  I reviewed with pt. He has been exercising more. Urged diet compliance. Recheck 6m      2. Benign essential hypertension  Assessment & Plan:  Well controlled. Cont present treatment. Monitor labs. Recheck 6m        3. Benign prostatic hyperplasia, unspecified whether lower urinary tract symptoms present  Assessment & Plan:  I reviewed with pt. Nocturia may be influenced by diet and fluid intake. Continue tamsulosin. Discussed diet/fluid strategies. Recheck 6m - earlier if worse          Subjective     f/u multiple med issues  - pt doing well. - pt still with nocturia (anywhere from 1x to 5x a night). Has not noted any specific triggers for increased urination. No dysuria. - still with bilat knee OA pain. Sees Dr Stephanie Rey at [address] [place] [address] [address]. Had bilat steroid shots approx 2m ago. Shots typically lasts several months before pain returns. - tries to exercise, but is not consistent. Recently has been going 2-5x a week depending on schedule (walking, strength). Pt denies CP, palpitations, lightheadedness or other CV symptoms with or without exertion  - pt denies any new GI or  issues. Still has nocturia as above. Due for repeat colonoscopy in   - I reviewed recent labs with pt. Chol acceptable. Fasting . Review of Systems   Constitutional: Negative. HENT: Negative. Eyes: Negative. Respiratory: Negative. Cardiovascular: Negative. Gastrointestinal: Negative. Endocrine: Negative. Genitourinary: Negative. Negative for decreased urine volume, difficulty urinating, flank pain, frequency and urgency. (+) nocturia   Musculoskeletal: Positive for arthralgias and joint swelling (occasionally). Negative for myalgias. Skin: Negative. Allergic/Immunologic: Negative. Neurological: Negative. Hematological: Negative. Psychiatric/Behavioral: Negative. Past Medical History:   Diagnosis Date   • BPH (benign prostatic hyperplasia)    • Dyslipidemia    • Hypertension    • Impaired fasting glucose    • Osteoarthritis of both knees      Past Surgical History:   Procedure Laterality Date   • COLONOSCOPY  11/23/2020    tubular adenoma; Dr Salima Thomas     Family History   Problem Relation Age of Onset   • Hypertension Mother    • Kidney disease Mother    • No Known Problems Sister    • No Known Problems Brother    • No Known Problems Brother    • No Known Problems Son    • No Known Problems Son    • No Known Problems Daughter      Social History     Socioeconomic History   • Marital status: Unknown     Spouse name: None   • Number of children: None   • Years of education: None   • Highest education level: None   Occupational History   • Occupation: retired   Tobacco Use   • Smoking status: Never   • Smokeless tobacco: Never   Vaping Use   • Vaping Use: Never used   Substance and Sexual Activity   • Alcohol use: Yes     Comment: occasional   • Drug use: Never   • Sexual activity: Not Currently   Other Topics Concern   • None   Social History Narrative   • None     Social Determinants of Health     Financial Resource Strain: Medium Risk (12/28/2022)    Overall Financial Resource Strain (CARDIA)    • Difficulty of Paying Living Expenses: Somewhat hard   Food Insecurity: Not on file   Transportation Needs: No Transportation Needs (12/28/2022)    PRAPARE - Transportation    • Lack of Transportation (Medical): No    • Lack of Transportation (Non-Medical):  No   Physical Activity: Not on file   Stress: Not on file   Social Connections: Not on file   Intimate Partner Violence: Not on file   Housing Stability: Not on file     Current Outpatient Medications on File Prior to Visit   Medication Sig   • enalapril (VASOTEC) 20 mg tablet TAKE 1 TABLET TWICE A DAY   • hydrochlorothiazide (HYDRODIURIL) 25 mg tablet TAKE 1 TABLET DAILY   • tamsulosin (FLOMAX) 0.4 mg Take 1 capsule (0.4 mg total) by mouth daily with dinner     No Known Allergies  Immunization History   Administered Date(s) Administered   • COVID-19 MODERNA VACC 0.5 ML IM 03/09/2021, 04/06/2021   • Fluzone Split Quad 0.5 mL 12/03/2015, 10/12/2017   • INFLUENZA 12/03/2015, 10/12/2017, 10/07/2021   • Influenza, high dose seasonal 0.7 mL 10/28/2020, 10/07/2021, 12/28/2022   • Pneumococcal Polysaccharide PPV23 02/17/2020   • Tdap 11/20/2014   • Zoster 11/20/2014       Objective     /80 (BP Location: Left arm, Patient Position: Sitting, Cuff Size: Large)   Pulse 81   Temp 97.9 °F (36.6 °C)   Ht 6' 2" (1.88 m)   Wt 128 kg (283 lb)   SpO2 98%   BMI 36.34 kg/m²     Physical Exam  Vitals reviewed. Constitutional:       Appearance: He is well-developed. HENT:      Head: Normocephalic and atraumatic. Right Ear: Tympanic membrane, ear canal and external ear normal.      Left Ear: Tympanic membrane, ear canal and external ear normal.      Mouth/Throat:      Mouth: Mucous membranes are moist.   Eyes:      General: No scleral icterus. Extraocular Movements: Extraocular movements intact. Conjunctiva/sclera: Conjunctivae normal.      Pupils: Pupils are equal, round, and reactive to light. Neck:      Thyroid: No thyromegaly. Cardiovascular:      Rate and Rhythm: Normal rate and regular rhythm. Pulses: Normal pulses. Heart sounds: Normal heart sounds. No murmur heard. Pulmonary:      Effort: Pulmonary effort is normal.      Breath sounds: Normal breath sounds. Abdominal:      General: Bowel sounds are normal. There is no distension. Palpations: Abdomen is soft. There is no mass. Tenderness: There is no abdominal tenderness. Musculoskeletal:         General: Deformity (mild diffuse arthritic changes) present. No swelling or tenderness.  Normal range of motion. Cervical back: Normal range of motion and neck supple. No tenderness. No muscular tenderness. Right lower leg: Edema (trace - unchanged) present. Left lower leg: Edema (trace - unchanged) present. Lymphadenopathy:      Cervical: No cervical adenopathy. Skin:     General: Skin is warm and dry. Capillary Refill: Capillary refill takes less than 2 seconds. Neurological:      Mental Status: He is alert and oriented to person, place, and time. Cranial Nerves: No cranial nerve deficit. Sensory: No sensory deficit. Motor: No weakness. Gait: Gait normal.      Deep Tendon Reflexes: Reflexes normal.   Psychiatric:         Mood and Affect: Mood normal.         Behavior: Behavior normal.         Thought Content:  Thought content normal.         Judgment: Judgment normal.      Comments: PHQ-2/9 Depression Screening    Little interest or pleasure in doing things: 0 - not at all  Feeling down, depressed, or hopeless: 0 - not at all  PHQ-2 Score: 0  PHQ-2 Interpretation: Negative depression screen       Lula Thornton MD

## 2023-07-12 NOTE — ASSESSMENT & PLAN NOTE
I reviewed with pt. Nocturia may be influenced by diet and fluid intake. Continue tamsulosin. Discussed diet/fluid strategies.  Recheck 6m - earlier if worse

## 2023-10-17 DIAGNOSIS — I10 BENIGN ESSENTIAL HYPERTENSION: ICD-10-CM

## 2023-10-18 RX ORDER — ENALAPRIL MALEATE 20 MG/1
TABLET ORAL
Qty: 180 TABLET | Refills: 1 | Status: SHIPPED | OUTPATIENT
Start: 2023-10-18

## 2023-12-01 DIAGNOSIS — I10 BENIGN ESSENTIAL HYPERTENSION: ICD-10-CM

## 2023-12-01 DIAGNOSIS — N40.0 BENIGN PROSTATIC HYPERPLASIA, UNSPECIFIED WHETHER LOWER URINARY TRACT SYMPTOMS PRESENT: ICD-10-CM

## 2023-12-01 RX ORDER — HYDROCHLOROTHIAZIDE 25 MG/1
TABLET ORAL
Qty: 90 TABLET | Refills: 1 | Status: SHIPPED | OUTPATIENT
Start: 2023-12-01

## 2023-12-01 RX ORDER — TAMSULOSIN HYDROCHLORIDE 0.4 MG/1
CAPSULE ORAL
Qty: 90 CAPSULE | Refills: 1 | Status: SHIPPED | OUTPATIENT
Start: 2023-12-01

## 2023-12-28 ENCOUNTER — RA CDI HCC (OUTPATIENT)
Dept: OTHER | Facility: HOSPITAL | Age: 68
End: 2023-12-28

## 2023-12-28 NOTE — PROGRESS NOTES
E66.01 for 2024  HCC coding opportunities          Chart Reviewed number of suggestions sent to Provider: 1     Patients Insurance     Medicare Insurance: Medicare

## 2024-01-04 ENCOUNTER — TELEPHONE (OUTPATIENT)
Age: 69
End: 2024-01-04

## 2024-01-04 ENCOUNTER — OFFICE VISIT (OUTPATIENT)
Dept: FAMILY MEDICINE CLINIC | Facility: CLINIC | Age: 69
End: 2024-01-04

## 2024-01-04 VITALS
DIASTOLIC BLOOD PRESSURE: 84 MMHG | HEART RATE: 91 BPM | BODY MASS INDEX: 36.65 KG/M2 | SYSTOLIC BLOOD PRESSURE: 124 MMHG | OXYGEN SATURATION: 96 % | WEIGHT: 285.6 LBS | HEIGHT: 74 IN | TEMPERATURE: 97.6 F

## 2024-01-04 DIAGNOSIS — I10 BENIGN ESSENTIAL HYPERTENSION: ICD-10-CM

## 2024-01-04 DIAGNOSIS — N52.9 ERECTILE DYSFUNCTION, UNSPECIFIED ERECTILE DYSFUNCTION TYPE: ICD-10-CM

## 2024-01-04 DIAGNOSIS — N40.0 BENIGN PROSTATIC HYPERPLASIA, UNSPECIFIED WHETHER LOWER URINARY TRACT SYMPTOMS PRESENT: ICD-10-CM

## 2024-01-04 DIAGNOSIS — Z00.00 MEDICARE ANNUAL WELLNESS VISIT, INITIAL: Primary | ICD-10-CM

## 2024-01-04 DIAGNOSIS — R73.01 IMPAIRED FASTING GLUCOSE: ICD-10-CM

## 2024-01-04 RX ORDER — TAMSULOSIN HYDROCHLORIDE 0.4 MG/1
0.8 CAPSULE ORAL
Qty: 180 CAPSULE | Refills: 1 | Status: ON HOLD | OUTPATIENT
Start: 2024-01-04

## 2024-01-04 RX ORDER — SILDENAFIL 50 MG/1
50 TABLET, FILM COATED ORAL DAILY PRN
Qty: 10 TABLET | Refills: 2 | Status: SHIPPED | OUTPATIENT
Start: 2024-01-04 | End: 2024-01-04 | Stop reason: SDUPTHER

## 2024-01-04 RX ORDER — SILDENAFIL 50 MG/1
50 TABLET, FILM COATED ORAL DAILY PRN
Qty: 10 TABLET | Refills: 2 | Status: SHIPPED | OUTPATIENT
Start: 2024-01-04

## 2024-01-04 NOTE — TELEPHONE ENCOUNTER
Patient called to verify pharmacy Viagra was sent to, [facility], patient does not want sent there. Requesting to be sent to [facility] in [place]. Any questions, [phone]

## 2024-01-04 NOTE — PROGRESS NOTES
Assessment and Plan:     Problem List Items Addressed This Visit        Endocrine    Impaired fasting glucose     Last glucose 111.  I reviewed with pt. We discussed need for BG control prior to having knee surgery and how increased BG increased risks for complications. Check labs. Recheck 6m            Cardiovascular and Mediastinum    Benign essential hypertension     Well controlled. Cont present treatment. Monitor labs. Recheck 6m           Relevant Orders    CBC and differential    Comprehensive metabolic panel    Lipid panel       Genitourinary    BPH (benign prostatic hyperplasia)     Sl worse. Will try increasing tamsulosin to 0.8mg q evening. Recheck 6m         Relevant Medications    tamsulosin (FLOMAX) 0.4 mg   Other Visit Diagnoses     Medicare annual wellness visit, initial    -  Primary    Erectile dysfunction, unspecified erectile dysfunction type                 Preventive health issues were discussed with patient, and age appropriate screening tests were ordered as noted in patient's After Visit Summary.  Personalized health advice and appropriate referrals for health education or preventive services given if needed, as noted in patient's After Visit Summary.     History of Present Illness:     Patient presents for a Medicare Wellness Visit    f/u multiple med issues and AWV  - pt doing well.  - pt has several moles and marks that he would like to have checked, including one on the L dorsal wrist, has been there 3 weeks  - still struggling with knee pain, R>L. Worse with prolonged ambulating, and with going up and down stairs. Pt had shots in both knees last October. Pt starting to think about TKRs.   - pt not exercising as much due to knee issues.  Still tries to keep active. Denies CP, palpitations, lightheadedness or other CV symptoms with or without exertion  - pt denies any new GI or  issues. Still has nocturia - typically 4-5x a night. Due for repeat colonoscopy in 2025  - I reviewed recent  labs with pt. Chol acceptable. Fasting .   - AWV done       Patient Care Team:  Lonnie Huitron MD as PCP - General (Family Medicine)     Review of Systems:     Review of Systems   Constitutional: Negative.    HENT: Negative.     Eyes: Negative.    Respiratory: Negative.     Cardiovascular: Negative.    Gastrointestinal: Negative.    Endocrine: Negative.    Genitourinary: Negative.  Negative for decreased urine volume, difficulty urinating, flank pain, frequency and urgency.        (+) nocturia   Musculoskeletal:  Positive for arthralgias (bilat knees), gait problem (when knees are painful) and joint swelling (occasionally). Negative for back pain and myalgias.   Skin: Negative.         Several moles that he would like to have checked   Allergic/Immunologic: Negative.    Neurological:  Negative for dizziness, weakness, light-headedness, numbness and headaches.   Hematological: Negative.    Psychiatric/Behavioral: Negative.          Problem List:     Patient Active Problem List   Diagnosis   • Abnormal electromyogram   • Benign essential hypertension   • BPH (benign prostatic hyperplasia)   • DDD (degenerative disc disease), lumbosacral   • Diastasis recti   • Impaired fasting glucose   • Other male erectile dysfunction   • Low HDL (under 40)   • Snoring   • Spinal stenosis of lumbar region   • Bilateral primary osteoarthritis of knee      Past Medical and Surgical History:     Past Medical History:   Diagnosis Date   • BPH (benign prostatic hyperplasia)    • Dyslipidemia    • Hypertension    • Impaired fasting glucose    • Osteoarthritis of both knees      Past Surgical History:   Procedure Laterality Date   • COLONOSCOPY  11/23/2020    tubular adenoma; Dr Ross      Family History:     Family History   Problem Relation Age of Onset   • Hypertension Mother    • Kidney disease Mother    • No Known Problems Sister    • No Known Problems Brother    • No Known Problems Brother    • No Known Problems Son     • No Known Problems Son    • No Known Problems Daughter       Social History:     Social History     Socioeconomic History   • Marital status: Unknown     Spouse name: None   • Number of children: None   • Years of education: None   • Highest education level: None   Occupational History   • Occupation: retired   Tobacco Use   • Smoking status: Never   • Smokeless tobacco: Never   Vaping Use   • Vaping status: Never Used   Substance and Sexual Activity   • Alcohol use: Yes     Comment: occasional   • Drug use: Never   • Sexual activity: Not Currently   Other Topics Concern   • None   Social History Narrative   • None     Social Determinants of Health     Financial Resource Strain: Low Risk  (1/4/2024)    Overall Financial Resource Strain (CARDIA)    • Difficulty of Paying Living Expenses: Not hard at all   Food Insecurity: Not on file   Transportation Needs: No Transportation Needs (1/4/2024)    PRAPARE - Transportation    • Lack of Transportation (Medical): No    • Lack of Transportation (Non-Medical): No   Physical Activity: Not on file   Stress: Not on file   Social Connections: Not on file   Intimate Partner Violence: Not on file   Housing Stability: Not on file      Medications and Allergies:     Current Outpatient Medications   Medication Sig Dispense Refill   • enalapril (VASOTEC) 20 mg tablet TAKE 1 TABLET TWICE A  tablet 1   • hydrochlorothiazide (HYDRODIURIL) 25 mg tablet TAKE 1 TABLET DAILY 90 tablet 1   • tamsulosin (FLOMAX) 0.4 mg Take 2 capsules (0.8 mg total) by mouth daily with dinner 180 capsule 1   • sildenafil (VIAGRA) 50 MG tablet Take 1 tablet (50 mg total) by mouth daily as needed for erectile dysfunction 10 tablet 2     No current facility-administered medications for this visit.     No Known Allergies   Immunizations:     Immunization History   Administered Date(s) Administered   • COVID-19 MODERNA VACC 0.5 ML IM 03/09/2021, 04/06/2021   • Fluzone Split Quad 0.5 mL 12/03/2015,  10/12/2017   • INFLUENZA 12/03/2015, 10/12/2017, 10/07/2021   • Influenza, high dose seasonal 0.7 mL 10/28/2020, 10/07/2021, 12/28/2022   • Pneumococcal Polysaccharide PPV23 02/17/2020   • Tdap 11/20/2014   • Zoster 11/20/2014      Health Maintenance:         Topic Date Due   • Colorectal Cancer Screening  11/23/2025   • Hepatitis C Screening  Completed         Topic Date Due   • Pneumococcal Vaccine: 65+ Years (2 - PCV) 02/17/2021   • Influenza Vaccine (1) 09/01/2023   • COVID-19 Vaccine (3 - 2023-24 season) 09/01/2023      Medicare Screening Tests and Risk Assessments:     Markie is here for his Subsequent Wellness visit. Last Medicare Wellness visit information reviewed, patient interviewed and updates made to the record today.      Health Risk Assessment:   Patient rates overall health as very good. Patient feels that their physical health rating is same. Patient is very satisfied with their life. Eyesight was rated as same. Hearing was rated as same. Patient feels that their emotional and mental health rating is same. Patients states they are never, rarely angry. Patient states they are never, rarely unusually tired/fatigued. Pain experienced in the last 7 days has been none. Patient states that he has experienced no weight loss or gain in last 6 months.     Depression Screening:   PHQ-2 Score: 0      Fall Risk Screening:   In the past year, patient has experienced: no history of falling in past year      Home Safety:  Patient has trouble with stairs inside or outside of their home. Patient has working smoke alarms and has no working carbon monoxide detector. Home safety hazards include: none.     Nutrition:   Current diet is Regular.     Medications:   Patient is currently taking over-the-counter supplements. OTC medications include: see medication list. Patient is able to manage medications.     Activities of Daily Living (ADLs)/Instrumental Activities of Daily Living (IADLs):   Walk and transfer into and out  of bed and chair?: Yes  Dress and groom yourself?: Yes    Bathe or shower yourself?: Yes    Feed yourself? Yes  Do your laundry/housekeeping?: Yes  Manage your money, pay your bills and track your expenses?: Yes  Make your own meals?: Yes    Do your own shopping?: Yes    Previous Hospitalizations:   Any hospitalizations or ED visits within the last 12 months?: No      Advance Care Planning:   Living will: Yes    Durable POA for healthcare: Yes    Advanced directive: Yes    Advanced directive counseling given: Yes      Cognitive Screening:   Provider or family/friend/caregiver concerned regarding cognition?: No    PREVENTIVE SCREENINGS      Cardiovascular Screening:    General: Screening Current      Diabetes Screening:     General: Screening Current      Colorectal Cancer Screening:     General: Screening Current      Prostate Cancer Screening:    General: Screening Current      Osteoporosis Screening:    General: Screening Not Indicated      Abdominal Aortic Aneurysm (AAA) Screening:    Risk factors include: age between 65-76 yo        Lung Cancer Screening:     General: Screening Not Indicated      Hepatitis C Screening:    General: Screening Current    Screening, Brief Intervention, and Referral to Treatment (SBIRT)    Screening      AUDIT-C Screenin) How often did you have a drink containing alcohol in the past year? never  2) How many drinks did you have on a typical day when you were drinking in the past year? 0  3) How often did you have 6 or more drinks on one occasion in the past year? never    AUDIT-C Score: 0  Interpretation: Score 0-3 (male): Negative screen for alcohol misuse    Single Item Drug Screening:  How often have you used an illegal drug (including marijuana) or a prescription medication for non-medical reasons in the past year? never    Single Item Drug Screen Score: 0  Interpretation: Negative screen for possible drug use disorder    Time Spent  Time spent screening/evaluating the  "patient for alcohol misuse: 5 minutes.     Annual Depression Screening  Time spent screening and evaluating the patient for depression during today's encounter was 5 minutes.    Other Counseling Topics:   Calcium and vitamin D intake and regular weightbearing exercise.     No results found.     Physical Exam:     /84 (BP Location: Left arm, Patient Position: Sitting, Cuff Size: Large)   Pulse 91   Temp 97.6 °F (36.4 °C)   Ht 6' 1.75\" (1.873 m)   Wt 130 kg (285 lb 9.6 oz)   SpO2 96%   BMI 36.92 kg/m²     Physical Exam  Vitals reviewed.   Constitutional:       Appearance: He is well-developed.   HENT:      Head: Normocephalic and atraumatic.      Right Ear: Tympanic membrane, ear canal and external ear normal.      Left Ear: Tympanic membrane, ear canal and external ear normal.      Nose: Nose normal.      Mouth/Throat:      Mouth: Mucous membranes are moist.   Eyes:      Extraocular Movements: Extraocular movements intact.      Conjunctiva/sclera: Conjunctivae normal.      Pupils: Pupils are equal, round, and reactive to light.   Neck:      Thyroid: No thyromegaly.      Vascular: No JVD.   Cardiovascular:      Rate and Rhythm: Normal rate and regular rhythm.      Pulses: Normal pulses.      Heart sounds: Normal heart sounds.   Pulmonary:      Effort: Pulmonary effort is normal.      Breath sounds: Normal breath sounds.   Abdominal:      General: Bowel sounds are normal. There is no distension.      Palpations: Abdomen is soft. There is no mass.      Tenderness: There is no abdominal tenderness.      Comments: Diastasis recti with ?umbilical hernia?   Musculoskeletal:         General: Deformity (bilat varus deformities of the knees.) present. No swelling or tenderness (mild along anterior knee joint line bilat). Normal range of motion.      Cervical back: Normal range of motion and neck supple. No tenderness. No muscular tenderness.      Right lower leg: No edema.      Left lower leg: No edema. "   Lymphadenopathy:      Cervical: No cervical adenopathy.   Skin:     General: Skin is warm.      Capillary Refill: Capillary refill takes less than 2 seconds.   Neurological:      Mental Status: He is alert and oriented to person, place, and time.      Cranial Nerves: No cranial nerve deficit.      Sensory: No sensory deficit.      Motor: No weakness or abnormal muscle tone.      Gait: Gait abnormal (mildly antalgic appearing gait).      Comments: minicog 5/5   Psychiatric:         Mood and Affect: Mood normal.         Behavior: Behavior normal.         Thought Content: Thought content normal.         Judgment: Judgment normal.      Comments: PHQ-2/9 Depression Screening    Little interest or pleasure in doing things: 0 - not at all  Feeling down, depressed, or hopeless: 0 - not at all  PHQ-2 Score: 0  PHQ-2 Interpretation: Negative depression screen               Lonnie Huitron MD

## 2024-01-05 ENCOUNTER — TELEPHONE (OUTPATIENT)
Dept: ADMINISTRATIVE | Facility: OTHER | Age: 69
End: 2024-01-05

## 2024-01-05 NOTE — TELEPHONE ENCOUNTER
----- Message from Vanda Rodrigues MA sent at 1/4/2024  2:36 PM EST -----  Regarding: care gap request  01/04/24 2:36 PM    Hello, our patient attached above has had Immunization(s) Flu/COVID completed/performed. Please assist in updating the patient chart by making an External outreach to [facility] located in [place]. The date of service is within the last year.    Thank you,  Vanda Rodrigues [facility]

## 2024-01-07 NOTE — ASSESSMENT & PLAN NOTE
Patient: Jolie Magdaleno   : 1973 MRN: [id number]    SUBJECTIVE:  Chief Complaint   Patient presents with   • Leg Pain     Worsening sharp pain in 3-4 months from Right heel to hip       A 49 year old female is here for the evaluation of leg pain and tingling.    Patient has given consent to record this visit for documentation in their clinical record.    HISTORY OF PRESENT ILLNESS:  Historian: Self    Lumbar disc disease with radiculopathy: Complains of intermittent sharp, shooting pain radiating from right heel upto thigh, lasting upto one hour with tingling in right foot. Pain started 4-5 months ago, but worsened and became frequent from three months. It is bilateral, but mainly on the right side, worsened on walking and relieved with sedative pill, accompanied by occasional back pain. Reports dull, aching back pain two weeks ago. Walking for more than usual time. Pain does not occur daily.  Had an MRI last year, which revealed a disc bulge and central disc protrusion.  Visited a physiatrist last year, when got five injections in the back at once.    Lumbar facet arthropathy: Reports weakness in both lower extremities. Visited neurologist in the past for her migraines. Inquired about which specialist to consult for weakness. Had undergone physical therapy, as suggested by her oncologist in . Denies doing gymnastics as a child.    Additional comments:    Stage 4 Colon cancer: Patient was diagnosed with colon cancer four years ago at an age of 45. Initially got chemotherapy, after which underwent a surgery. Have a six more chemotherapy sessions left. Had undergone a PET scan, which revealed stage 4 colon cancer. She experienced symptoms like severe abdominal pain, blood in stool, and back pain. Denies family history of cancer. Patient informs that she will be consulting her oncologist, Eric Knight for CT scan next year, as it has been five years.    PAST MEDICAL HISTORY:  Past Medical  Well controlled. Cont present treatment. Monitor labs. Recheck 6m     History:   Diagnosis Date   • Allergy 07/16/2016    Fiornonal/ migraine med.    • Anxiety    • Depressive disorder    • Diverticulosis of colon 4/1/2019   • Essential (primary) hypertension    • GERD (gastroesophageal reflux disease)    • History of colon cancer 02/01/2019    Stage 4 with liver mets   • Malignant neoplasm (CMS/HCC)    • Migraine, unspecified, without mention of intractable migraine without mention of status migrainosus     Migraine: triggers are stress     MEDICATIONS:  Current Outpatient Medications   Medication Sig   • eptinezumab-jjmr (VYEPTI) 100 MG/ML Solution Inject 300 mg into the vein every 30 days.   • Triamcinolone Acetonide (NASACORT ALLERGY 24HR NA) Spray in each nostril daily.   • cholecalciferol (VITAMIN D) 25 mcg (1,000 units) tablet Take 2,000 Units by mouth daily.   • cyclobenzaprine (FLEXERIL) 10 MG tablet Take 1 tablet by mouth 3 times daily as needed for Muscle spasms. Causes drowsiness     No current facility-administered medications for this visit.     ALLERGIES:  ALLERGIES:   Allergen Reactions   • Dicyclomine DIZZINESS and Nausea & Vomiting     Drowsiness, Leg pain, Tightness in Chest  dizziness, drowsiness, nausea and leg pain , tightness in chest     • Amlodipine CONSTIPATION     Constipation  constipation     • Celecoxib DIARRHEA   • Ibuprofen Other (See Comments) and CONSTIPATION     Constipation  Constipation     • Lisinopril GI UPSET   • Losartan GI UPSET   • Meloxicam DIZZINESS   • Naproxen GI UPSET and Other (See Comments)     Constipation  Constipation     • Topiramate Nausea & Vomiting and GI UPSET   • Verapamil Other (See Comments) and CONSTIPATION     Constipation  Caused severe constipation     • Butalbital-Asa-Caffeine      Pt states she does not remember her rxn to [facility].   • Prochlorperazine Other (See Comments) and GI UPSET   • Promethazine Other (See Comments)   • Venlafaxine Other (See Comments)   • Metoclopramide MYALGIA and Other (See Comments)     Pain  In Arms and Legs  pain in arms and legs     • Propranolol DIZZINESS     PAST SURGICAL HISTORY:  Past Surgical History:   Procedure Laterality Date   • Colon surgery      Resection 2/2 cancer   • Past surgical history      PSH of bladder surgery at age 4 yo     FAMILY HISTORY:  Family History   Problem Relation Age of Onset   • Hypertension Mother    • Cancer Mother         Skin cancer   • Hypertension Father    • Cancer Father         Prostate cancer, skin cancer   • Cancer Maternal Grandmother         breast   • Heart Maternal Grandfather    • Cancer Maternal Grandfather         Lung cancer   • Heart Paternal Grandfather      SOCIAL HISTORY:  Social History     Tobacco Use   Smoking Status Never Smoker   Smokeless Tobacco Never Used     Social History     Substance and Sexual Activity   Alcohol Use Never   • Alcohol/week: 0.0 standard drinks       REVIEW OF SYSTEMS:  Musculoskeletal: As per HPI.  Neurologic: As per HPI.  ROS was obtained as per HPI, and all the systems reviewed otherwise were negative.    OBJECTIVE:  Vitals:    11/21/22 1853   BP: 118/74   BP Location: RUE - Right upper extremity   Patient Position: Sitting   Cuff Size: Regular   Pulse: 86   SpO2: 99%   Weight: 89.8 kg (198 lb)   LMP: 05/30/2019     Body mass index is 29.24 kg/m².      PHYSICAL EXAM:  Constitutional: In no acute distress. Well-developed.  Eyes: The conjunctiva exhibited no abnormalities. The sclera was normal.  Musculoskeletal: normal gait. No musculoskeletal erythema was seen, no joint swelling seen and no joint tenderness was elicited. No scoliosis. Normal range of motion. There was no joint instability noted. Muscle strength and tone were normal.   Pulmonary: Breath sounds clear to auscultation bilaterally, but no respiratory distress and normal respiratory rate and effort.   Cardiovascular: Normal rate, regular rhythm, normal S1, normal S2 and 1+ non pitting edema was present bilaterally in the lower extremities.   Psychiatric:  Oriented to time, place, and person. The mood was normal. The effect was normal. The memory was unimpaired.   Skin: Skin moisture and turgor normal.      ASSESSMENT AND PLAN:  This is a 49 year old female who presents with :    Orders Placed This Encounter   • MRI LUMBAR SPINE WO CONTRAST   • SERVICE TO ORTHOPEDICS       Plan:  Lumbar disc disease with radiculopathy:  Suspects, worsening of disc bulge is causing leg and back pain.  Reviewed and discussed previous MRI scan.    Ordered MRI LUMBAR SPINE WO CONTRAST; Future.  Educated high dose ibuprofen and prednisone are used in constant pain.  Advised taking ibuprofen when feels pain or while going out as a precaution.   Advised taking tylenol, as patient reports constipation with ibuprofen.  Explained about the flare ups and persistence of pain.  Educated when inflammation increases, walking creates pressure on nerve and causes back pain.  Educated about the pathophysiology of sciatic nerve, and bulging disc.  Provided SERVICE TO ORTHOPEDICS.    Lumbar facet arthropathy:  Reviewed and discussed previous MRI scan.  Ordered MRI LUMBAR SPINE WO CONTRAST; Future.  Recommended consulting neurologist for lower extremity weakness.  Explained about anatomy of facet, and its bulging, arthritis, and narrowing of joint space.  Informed about worsening of bulge between L5 and sacrum.  Educated on the stages of bulge, like herniation.  Provided SERVICE TO ORTHOPEDICS.      Additional Plan:    Stage 4 Colon cancer:  Currently, well controlled.  Discussed regarding need of PET scan at an age of 44 years for early diagnosis.  Discussed about surveillance and follow up of her cancer.    Follow up as needed.    Refer to orders.  Medical compliance with plan discussed and risks of non-compliance reviewed.  Patient education completed on disease process, etiology & prognosis.  Proper usage and side effects of medications reviewed & discussed.  Patient understands and agrees with the  plan.  Return to clinic as clinically indicated as discussed with patient who verbalized understanding of the plan and is in agreement with the plan.    No follow-ups on file.    I,  Dr. Marysol Marie, have created a visit summary document based on the audio recording between Dr. Rose Linda MD and this patient for the physician to review, edit as needed, and authenticate.  Creation Date: 11/22/2022

## 2024-01-07 NOTE — ASSESSMENT & PLAN NOTE
Last glucose 111.  I reviewed with pt. We discussed need for BG control prior to having knee surgery and how increased BG increased risks for complications. Check labs. Recheck 6m

## 2024-01-08 NOTE — TELEPHONE ENCOUNTER
Upon review of the In Basket request we are unable to retrieve vaccination records due to pharmacy refusal to send the requested documentation. Patient will need to contact the pharmacy location and follow the site's protocol in order to request records.    Any additional questions or concerns should be emailed to the Practice Liaisons via the appropriate education email address, please do not reply via In Basket.    Thank you  Chiara Rodriguez MA

## 2024-01-18 ENCOUNTER — APPOINTMENT (EMERGENCY)
Dept: RADIOLOGY | Facility: HOSPITAL | Age: 69
DRG: 871 | End: 2024-01-18
Payer: MEDICARE

## 2024-01-18 ENCOUNTER — TELEPHONE (OUTPATIENT)
Age: 69
End: 2024-01-18

## 2024-01-18 ENCOUNTER — HOSPITAL ENCOUNTER (OUTPATIENT)
Dept: RADIOLOGY | Facility: HOSPITAL | Age: 69
Discharge: HOME | DRG: 871 | End: 2024-01-18
Payer: MEDICARE

## 2024-01-18 ENCOUNTER — OFFICE VISIT (OUTPATIENT)
Dept: FAMILY MEDICINE CLINIC | Facility: CLINIC | Age: 69
End: 2024-01-18
Payer: MEDICARE

## 2024-01-18 ENCOUNTER — HOSPITAL ENCOUNTER (INPATIENT)
Facility: HOSPITAL | Age: 69
LOS: 5 days | Discharge: HOME | DRG: 871 | End: 2024-01-23
Attending: INTERNAL MEDICINE | Admitting: STUDENT IN AN ORGANIZED HEALTH CARE EDUCATION/TRAINING PROGRAM
Payer: MEDICARE

## 2024-01-18 VITALS
SYSTOLIC BLOOD PRESSURE: 128 MMHG | OXYGEN SATURATION: 90 % | DIASTOLIC BLOOD PRESSURE: 78 MMHG | TEMPERATURE: 98.2 F | BODY MASS INDEX: 16.57 KG/M2 | HEIGHT: 73 IN | WEIGHT: 125 LBS | HEART RATE: 105 BPM

## 2024-01-18 DIAGNOSIS — R09.02 HYPOXIA: ICD-10-CM

## 2024-01-18 DIAGNOSIS — R06.02 SHORTNESS OF BREATH: ICD-10-CM

## 2024-01-18 DIAGNOSIS — J20.5 RSV BRONCHITIS: ICD-10-CM

## 2024-01-18 DIAGNOSIS — J96.01 ACUTE RESPIRATORY FAILURE WITH HYPOXIA (HCC): ICD-10-CM

## 2024-01-18 DIAGNOSIS — E78.5 HYPERLIPIDEMIA: ICD-10-CM

## 2024-01-18 DIAGNOSIS — J20.9 ACUTE BRONCHITIS WITH BRONCHOSPASM: ICD-10-CM

## 2024-01-18 DIAGNOSIS — R68.89 FLU-LIKE SYMPTOMS: ICD-10-CM

## 2024-01-18 DIAGNOSIS — J21.0 RSV (ACUTE BRONCHIOLITIS DUE TO RESPIRATORY SYNCYTIAL VIRUS): Primary | ICD-10-CM

## 2024-01-18 DIAGNOSIS — I10 HYPERTENSION: ICD-10-CM

## 2024-01-18 DIAGNOSIS — J20.9 ACUTE BRONCHITIS, UNSPECIFIED ORGANISM: Primary | ICD-10-CM

## 2024-01-18 PROBLEM — J96.00 ACUTE RESPIRATORY FAILURE (HCC): Status: ACTIVE | Noted: 2024-01-18

## 2024-01-18 PROBLEM — A41.9 SEPSIS (HCC): Status: ACTIVE | Noted: 2024-01-18

## 2024-01-18 LAB
2HR DELTA HS TROPONIN: 0 NG/L
4HR DELTA HS TROPONIN: -2 NG/L
ALBUMIN SERPL BCP-MCNC: 4.1 G/DL (ref 3.5–5)
ALP SERPL-CCNC: 44 U/L (ref 34–104)
ALT SERPL W P-5'-P-CCNC: 27 U/L (ref 7–52)
ANION GAP SERPL CALCULATED.3IONS-SCNC: 10 MMOL/L
APTT PPP: 32 SECONDS (ref 23–37)
AST SERPL W P-5'-P-CCNC: 18 U/L (ref 13–39)
BACTERIA UR QL AUTO: ABNORMAL /HPF
BASOPHILS # BLD AUTO: 0.03 THOUSANDS/ÂΜL (ref 0–0.1)
BASOPHILS NFR BLD AUTO: 0 % (ref 0–1)
BILIRUB SERPL-MCNC: 0.66 MG/DL (ref 0.2–1)
BILIRUB UR QL STRIP: NEGATIVE
BUN SERPL-MCNC: 23 MG/DL (ref 5–25)
CALCIUM SERPL-MCNC: 9.5 MG/DL (ref 8.4–10.2)
CARDIAC TROPONIN I PNL SERPL HS: 12 NG/L
CARDIAC TROPONIN I PNL SERPL HS: 14 NG/L
CARDIAC TROPONIN I PNL SERPL HS: 14 NG/L
CHLORIDE SERPL-SCNC: 100 MMOL/L (ref 96–108)
CLARITY UR: CLEAR
CO2 SERPL-SCNC: 25 MMOL/L (ref 21–32)
COLOR UR: ABNORMAL
CREAT SERPL-MCNC: 0.94 MG/DL (ref 0.6–1.3)
EOSINOPHIL # BLD AUTO: 0.12 THOUSAND/ÂΜL (ref 0–0.61)
EOSINOPHIL NFR BLD AUTO: 1 % (ref 0–6)
ERYTHROCYTE [DISTWIDTH] IN BLOOD BY AUTOMATED COUNT: 12.8 % (ref 11.6–15.1)
FLUAV RNA RESP QL NAA+PROBE: NEGATIVE
FLUBV RNA RESP QL NAA+PROBE: NEGATIVE
GFR SERPL CREATININE-BSD FRML MDRD: 82 ML/MIN/1.73SQ M
GLUCOSE SERPL-MCNC: 114 MG/DL (ref 65–140)
GLUCOSE UR STRIP-MCNC: NEGATIVE MG/DL
HCT VFR BLD AUTO: 39.1 % (ref 36.5–49.3)
HGB BLD-MCNC: 13.6 G/DL (ref 12–17)
HGB UR QL STRIP.AUTO: ABNORMAL
IMM GRANULOCYTES # BLD AUTO: 0.04 THOUSAND/UL (ref 0–0.2)
IMM GRANULOCYTES NFR BLD AUTO: 1 % (ref 0–2)
INR PPP: 1.06 (ref 0.84–1.19)
KETONES UR STRIP-MCNC: NEGATIVE MG/DL
LACTATE SERPL-SCNC: 0.8 MMOL/L (ref 0.5–2)
LEUKOCYTE ESTERASE UR QL STRIP: NEGATIVE
LYMPHOCYTES # BLD AUTO: 1 THOUSANDS/ÂΜL (ref 0.6–4.47)
LYMPHOCYTES NFR BLD AUTO: 12 % (ref 14–44)
MAGNESIUM SERPL-MCNC: 2 MG/DL (ref 1.9–2.7)
MCH RBC QN AUTO: 31.8 PG (ref 26.8–34.3)
MCHC RBC AUTO-ENTMCNC: 34.8 G/DL (ref 31.4–37.4)
MCV RBC AUTO: 91 FL (ref 82–98)
MONOCYTES # BLD AUTO: 0.82 THOUSAND/ÂΜL (ref 0.17–1.22)
MONOCYTES NFR BLD AUTO: 10 % (ref 4–12)
MUCOUS THREADS UR QL AUTO: ABNORMAL
NEUTROPHILS # BLD AUTO: 6.36 THOUSANDS/ÂΜL (ref 1.85–7.62)
NEUTS SEG NFR BLD AUTO: 76 % (ref 43–75)
NITRITE UR QL STRIP: NEGATIVE
NON-SQ EPI CELLS URNS QL MICRO: ABNORMAL /HPF
NRBC BLD AUTO-RTO: 0 /100 WBCS
PH UR STRIP.AUTO: 5.5 [PH]
PLATELET # BLD AUTO: 187 THOUSANDS/UL (ref 149–390)
PMV BLD AUTO: 9.5 FL (ref 8.9–12.7)
POTASSIUM SERPL-SCNC: 3.4 MMOL/L (ref 3.5–5.3)
PROT SERPL-MCNC: 7.2 G/DL (ref 6.4–8.4)
PROT UR STRIP-MCNC: ABNORMAL MG/DL
PROTHROMBIN TIME: 14.4 SECONDS (ref 11.6–14.5)
RBC # BLD AUTO: 4.28 MILLION/UL (ref 3.88–5.62)
RBC #/AREA URNS AUTO: ABNORMAL /HPF
RSV RNA RESP QL NAA+PROBE: POSITIVE
SARS-COV-2 AG UPPER RESP QL IA: NEGATIVE
SARS-COV-2 RNA RESP QL NAA+PROBE: NEGATIVE
SODIUM SERPL-SCNC: 135 MMOL/L (ref 135–147)
SP GR UR STRIP.AUTO: 1.02 (ref 1–1.03)
UROBILINOGEN UR STRIP-ACNC: <2 MG/DL
VALID CONTROL: NORMAL
WBC # BLD AUTO: 8.37 THOUSAND/UL (ref 4.31–10.16)
WBC #/AREA URNS AUTO: ABNORMAL /HPF

## 2024-01-18 PROCEDURE — 99223 1ST HOSP IP/OBS HIGH 75: CPT | Performed by: NURSE PRACTITIONER

## 2024-01-18 PROCEDURE — 83605 ASSAY OF LACTIC ACID: CPT | Performed by: PHYSICIAN ASSISTANT

## 2024-01-18 PROCEDURE — 87040 BLOOD CULTURE FOR BACTERIA: CPT | Performed by: PHYSICIAN ASSISTANT

## 2024-01-18 PROCEDURE — 96361 HYDRATE IV INFUSION ADD-ON: CPT

## 2024-01-18 PROCEDURE — 81001 URINALYSIS AUTO W/SCOPE: CPT | Performed by: PHYSICIAN ASSISTANT

## 2024-01-18 PROCEDURE — 85730 THROMBOPLASTIN TIME PARTIAL: CPT | Performed by: PHYSICIAN ASSISTANT

## 2024-01-18 PROCEDURE — 80053 COMPREHEN METABOLIC PANEL: CPT | Performed by: PHYSICIAN ASSISTANT

## 2024-01-18 PROCEDURE — 83735 ASSAY OF MAGNESIUM: CPT | Performed by: PHYSICIAN ASSISTANT

## 2024-01-18 PROCEDURE — 93005 ELECTROCARDIOGRAM TRACING: CPT

## 2024-01-18 PROCEDURE — 71046 X-RAY EXAM CHEST 2 VIEWS: CPT

## 2024-01-18 PROCEDURE — 85025 COMPLETE CBC W/AUTO DIFF WBC: CPT | Performed by: PHYSICIAN ASSISTANT

## 2024-01-18 PROCEDURE — 99214 OFFICE O/P EST MOD 30 MIN: CPT | Performed by: NURSE PRACTITIONER

## 2024-01-18 PROCEDURE — 0241U HB NFCT DS VIR RESP RNA 4 TRGT: CPT | Performed by: PHYSICIAN ASSISTANT

## 2024-01-18 PROCEDURE — 96374 THER/PROPH/DIAG INJ IV PUSH: CPT

## 2024-01-18 PROCEDURE — 99285 EMERGENCY DEPT VISIT HI MDM: CPT

## 2024-01-18 PROCEDURE — 84484 ASSAY OF TROPONIN QUANT: CPT | Performed by: PHYSICIAN ASSISTANT

## 2024-01-18 PROCEDURE — 85610 PROTHROMBIN TIME: CPT | Performed by: PHYSICIAN ASSISTANT

## 2024-01-18 PROCEDURE — 84145 PROCALCITONIN (PCT): CPT | Performed by: NURSE PRACTITIONER

## 2024-01-18 PROCEDURE — 71045 X-RAY EXAM CHEST 1 VIEW: CPT

## 2024-01-18 PROCEDURE — 36415 COLL VENOUS BLD VENIPUNCTURE: CPT | Performed by: PHYSICIAN ASSISTANT

## 2024-01-18 PROCEDURE — 99285 EMERGENCY DEPT VISIT HI MDM: CPT | Performed by: PHYSICIAN ASSISTANT

## 2024-01-18 PROCEDURE — 87811 SARS-COV-2 COVID19 W/OPTIC: CPT | Performed by: NURSE PRACTITIONER

## 2024-01-18 RX ORDER — BENZONATATE 100 MG/1
100 CAPSULE ORAL 3 TIMES DAILY PRN
Qty: 30 CAPSULE | Refills: 0 | Status: SHIPPED | OUTPATIENT
Start: 2024-01-18 | End: 2024-01-23 | Stop reason: SDUPTHER

## 2024-01-18 RX ORDER — ALBUTEROL SULFATE 90 UG/1
2 AEROSOL, METERED RESPIRATORY (INHALATION) ONCE
Status: COMPLETED | OUTPATIENT
Start: 2024-01-18 | End: 2024-01-18

## 2024-01-18 RX ORDER — AZITHROMYCIN 250 MG/1
TABLET, FILM COATED ORAL
Qty: 6 TABLET | Refills: 0 | Status: SHIPPED | OUTPATIENT
Start: 2024-01-18 | End: 2024-01-23

## 2024-01-18 RX ORDER — METHYLPREDNISOLONE SODIUM SUCCINATE 125 MG/2ML
80 INJECTION, POWDER, LYOPHILIZED, FOR SOLUTION INTRAMUSCULAR; INTRAVENOUS ONCE
Status: COMPLETED | OUTPATIENT
Start: 2024-01-18 | End: 2024-01-18

## 2024-01-18 RX ADMIN — ALBUTEROL SULFATE 2 PUFF: 90 AEROSOL, METERED RESPIRATORY (INHALATION) at 17:26

## 2024-01-18 RX ADMIN — METHYLPREDNISOLONE SODIUM SUCCINATE 80 MG: 125 INJECTION, POWDER, FOR SOLUTION INTRAMUSCULAR; INTRAVENOUS at 20:14

## 2024-01-18 RX ADMIN — SODIUM CHLORIDE 1000 ML: 0.9 INJECTION, SOLUTION INTRAVENOUS at 17:13

## 2024-01-18 NOTE — TELEPHONE ENCOUNTER
After speaking to patient I did convince him to go to the ER.     I called 911 and left a detailed message with his emergency contact .

## 2024-01-18 NOTE — ED PROVIDER NOTES
History  Chief Complaint   Patient presents with    Shortness of Breath     At PCP this AM for SOB on exertion. Pt went home and reports PCP called ambulance to have pt checked out. (+) cough, CP only with a cough. Denies fever/chills.        History provided by:  Patient  URI  Presenting symptoms: congestion, cough, fatigue and rhinorrhea    Presenting symptoms: no ear pain, no facial pain, no fever and no sore throat    Congestion:     Location:  Nasal and chest    Interferes with sleep: yes      Interferes with eating/drinking: yes    Cough:     Cough characteristics:  Dry and non-productive    Severity:  Moderate    Onset quality:  Gradual    Duration:  4 days    Timing:  Intermittent    Progression:  Unchanged    Chronicity:  New  Fatigue:     Severity:  Mild    Duration:  4 days    Timing:  Constant    Progression:  Waxing and waning  Rhinorrhea:     Quality:  Clear    Severity:  Mild    Duration:  5 hours    Timing:  Intermittent    Progression:  Unchanged  Severity:  Mild  Onset quality:  Gradual  Timing:  Intermittent  Progression:  Waxing and waning  Chronicity:  New  Relieved by:  Rest  Worsened by:  Breathing  Ineffective treatments:  None tried  Associated symptoms: myalgias and wheezing    Associated symptoms: no arthralgias, no headaches, no neck pain, no sinus pain, no sneezing and no swollen glands    Risk factors: being elderly and recent illness    Risk factors: no chronic cardiac disease, no chronic kidney disease, no chronic respiratory disease, no diabetes mellitus, no immunosuppression, no recent travel and no sick contacts        Prior to Admission Medications   Prescriptions Last Dose Informant Patient Reported? Taking?   azithromycin (ZITHROMAX) 250 mg tablet   No No   Sig: Take 2 tabs on Day 1 than 1 tab Days 2-5   benzonatate (TESSALON PERLES) 100 mg capsule   No No   Sig: Take 1 capsule (100 mg total) by mouth 3 (three) times a day as needed for cough   enalapril (VASOTEC) 20 mg tablet   Self No No   Sig: TAKE 1 TABLET TWICE A DAY   hydrochlorothiazide (HYDRODIURIL) 25 mg tablet  Self No No   Sig: TAKE 1 TABLET DAILY   sildenafil (VIAGRA) 50 MG tablet  Self No No   Sig: Take 1 tablet (50 mg total) by mouth daily as needed for erectile dysfunction   tamsulosin (FLOMAX) 0.4 mg  Self No No   Sig: Take 2 capsules (0.8 mg total) by mouth daily with dinner      Facility-Administered Medications: None       Past Medical History:   Diagnosis Date    BPH (benign prostatic hyperplasia)     Dyslipidemia     Hypertension     Impaired fasting glucose     Osteoarthritis of both knees        Past Surgical History:   Procedure Laterality Date    COLONOSCOPY  11/23/2020    tubular adenoma; Dr Ross       Family History   Problem Relation Age of Onset    Hypertension Mother     Kidney disease Mother     No Known Problems Sister     No Known Problems Brother     No Known Problems Brother     No Known Problems Son     No Known Problems Son     No Known Problems Daughter      I have reviewed and agree with the history as documented.    E-Cigarette/Vaping    E-Cigarette Use Never User      E-Cigarette/Vaping Substances    Nicotine No     THC No     CBD No     Flavoring No     Other No     Unknown No      Social History     Tobacco Use    Smoking status: Never    Smokeless tobacco: Never   Vaping Use    Vaping status: Never Used   Substance Use Topics    Alcohol use: Not Currently     Comment: occasional    Drug use: Never       Review of Systems   Constitutional:  Positive for activity change and fatigue. Negative for appetite change, chills, diaphoresis and fever.   HENT:  Positive for congestion, postnasal drip and rhinorrhea. Negative for ear discharge, ear pain, mouth sores, sinus pain, sneezing, sore throat and trouble swallowing.    Eyes:  Negative for pain, discharge, redness and itching.   Respiratory:  Positive for cough, shortness of breath and wheezing. Negative for chest tightness.    Cardiovascular:   Negative for palpitations and leg swelling.   Gastrointestinal:  Negative for abdominal pain, diarrhea, nausea and vomiting.   Endocrine: Negative for cold intolerance, heat intolerance, polydipsia and polyphagia.   Genitourinary:  Negative for dysuria, frequency, hematuria and urgency.   Musculoskeletal:  Positive for myalgias. Negative for arthralgias and neck pain.   Skin:  Negative for color change, pallor and rash.   Neurological:  Negative for headaches.   Psychiatric/Behavioral:  Negative for confusion.    All other systems reviewed and are negative.      Physical Exam  Physical Exam  Vitals and nursing note reviewed.   Constitutional:       General: He is not in acute distress.     Appearance: He is well-developed. He is obese. He is not ill-appearing, toxic-appearing or diaphoretic.   HENT:      Head: Normocephalic and atraumatic.      Mouth/Throat:      Mouth: Mucous membranes are moist.   Neck:      Vascular: No JVD.   Cardiovascular:      Rate and Rhythm: Normal rate and regular rhythm.      Heart sounds: No murmur heard.     No friction rub. No gallop.   Pulmonary:      Effort: Pulmonary effort is normal.      Breath sounds: Wheezing and rhonchi present. No rales.   Abdominal:      Palpations: Abdomen is soft. There is no hepatomegaly or mass.      Tenderness: There is no abdominal tenderness. There is no guarding.   Musculoskeletal:      Cervical back: Neck supple.      Right lower leg: No edema.      Left lower leg: No edema.   Lymphadenopathy:      Cervical: No cervical adenopathy.   Skin:     General: Skin is warm.      Capillary Refill: Capillary refill takes less than 2 seconds.   Neurological:      Mental Status: He is alert and oriented to person, place, and time.   Psychiatric:         Mood and Affect: Mood normal.         Behavior: Behavior normal.         Vital Signs  ED Triage Vitals [01/18/24 1629]   Temperature Pulse Respirations Blood Pressure SpO2   98.2 °F (36.8 °C) 99 (!) 26 (!) 174/95  91 %      Temp Source Heart Rate Source Patient Position - Orthostatic VS BP Location FiO2 (%)   Oral Monitor Sitting Right arm --      Pain Score       --           Vitals:    01/18/24 1629 01/18/24 1700 01/18/24 1832   BP: (!) 174/95  (!) 176/85   Pulse: 99 93 97   Patient Position - Orthostatic VS: Sitting  Lying         Visual Acuity      ED Medications  Medications   sodium chloride 0.9 % bolus 1,000 mL (0 mL Intravenous Stopped 1/18/24 1813)   albuterol (PROVENTIL HFA,VENTOLIN HFA) inhaler 2 puff (2 puffs Inhalation Given 1/18/24 1726)   methylPREDNISolone sodium succinate (Solu-MEDROL) injection 80 mg (80 mg Intravenous Given 1/18/24 2014)       Diagnostic Studies  Results Reviewed       Procedure Component Value Units Date/Time    HS Troponin I 2hr [[id number]]  (Normal) Collected: 01/18/24 1924    Lab Status: Final result Specimen: Blood from Arm, Left Updated: 01/18/24 1957     hs TnI 2hr 14 ng/L      Delta 2hr hsTnI 0 ng/L     HS Troponin I 4hr [[id number]]     Lab Status: No result Specimen: Blood     Urine Microscopic [[id number]]  (Abnormal) Collected: 01/18/24 1841    Lab Status: Final result Specimen: Urine, Clean Catch Updated: 01/18/24 1854     RBC, UA 2-4 /hpf      WBC, UA 1-2 /hpf      Epithelial Cells None Seen /hpf      Bacteria, UA None Seen /hpf      MUCUS THREADS Occasional    UA w Reflex to Microscopic w Reflex to Culture [[id number]]  (Abnormal) Collected: 01/18/24 1841    Lab Status: Final result Specimen: Urine, Clean Catch Updated: 01/18/24 1852     Color, UA Light Yellow     Clarity, UA Clear     Specific Gravity, UA 1.022     pH, UA 5.5     Leukocytes, UA Negative     Nitrite, UA Negative     Protein, UA Trace mg/dl      Glucose, UA Negative mg/dl      Ketones, UA Negative mg/dl      Urobilinogen, UA <2.0 mg/dl      Bilirubin, UA Negative     Occult Blood, UA Moderate    FLU/RSV/COVID - if FLU/RSV clinically relevant [[id number]]  (Abnormal) Collected: 01/18/24 1712    Lab Status: Final  result Specimen: Nares from Nose Updated: 01/18/24 1830     SARS-CoV-2 Negative     INFLUENZA A PCR Negative     INFLUENZA B PCR Negative     RSV PCR Positive    Narrative:      FOR PEDIATRIC PATIENTS - copy/paste COVID Guidelines URL to browser: [email or web address]    SARS-CoV-2 assay is a Nucleic Acid Amplification assay intended for the  qualitative detection of nucleic acid from SARS-CoV-2 in nasopharyngeal  swabs. Results are for the presumptive identification of SARS-CoV-2 RNA.    Positive results are indicative of infection with SARS-CoV-2, the virus  causing COVID-19, but do not rule out bacterial infection or co-infection  with other viruses. Laboratories within the [place] and its  territories are required to report all positive results to the appropriate  public health authorities. Negative results do not preclude SARS-CoV-2  infection and should not be used as the sole basis for treatment or other  patient management decisions. Negative results must be combined with  clinical observations, patient history, and epidemiological information.  This test has not been FDA cleared or approved.    This test has been authorized by FDA under an Emergency Use Authorization  (EUA). This test is only authorized for the duration of time the  declaration that circumstances exist justifying the authorization of the  emergency use of an in vitro diagnostic tests for detection of SARS-CoV-2  virus and/or diagnosis of COVID-19 infection under section 564(b)(1) of  the Act, 21 U.S.C. 360bbb-3(b)(1), unless the authorization is terminated  or revoked sooner. The test has been validated but independent review by FDA  and CLIA is pending.    Test performed using [employer]: This RT-PCR assay targets N2,  a region unique to SARS-CoV-2. A conserved region in the E-gene was chosen  for pan-Sarbecovirus detection which includes SARS-CoV-2.    According to  CMS-2020-01-R, this platform meets the definition of high-throughput technology.    Lactic acid, plasma (w/reflex if result > 2.0) [[id number]]  (Normal) Collected: 01/18/24 1712    Lab Status: Final result Specimen: Blood from Arm, Left Updated: 01/18/24 1814     LACTIC ACID 0.8 mmol/L     Narrative:      Result may be elevated if tourniquet was used during collection.    HS Troponin 0hr (reflex protocol) [[id number]]  (Normal) Collected: 01/18/24 1712    Lab Status: Final result Specimen: Blood from Arm, Left Updated: 01/18/24 1757     hs TnI 0hr 14 ng/L     Comprehensive metabolic panel [[id number]]  (Abnormal) Collected: 01/18/24 1712    Lab Status: Final result Specimen: Blood from Arm, Left Updated: 01/18/24 1756     Sodium 135 mmol/L      Potassium 3.4 mmol/L      Chloride 100 mmol/L      CO2 25 mmol/L      ANION GAP 10 mmol/L      BUN 23 mg/dL      Creatinine 0.94 mg/dL      Glucose 114 mg/dL      Calcium 9.5 mg/dL      AST 18 U/L      ALT 27 U/L      Alkaline Phosphatase 44 U/L      Total Protein 7.2 g/dL      Albumin 4.1 g/dL      Total Bilirubin 0.66 mg/dL      eGFR 82 ml/min/1.73sq m     Narrative:      National Kidney Disease Foundation guidelines for Chronic Kidney Disease (CKD):     Stage 1 with normal or high GFR (GFR > 90 mL/min/1.73 square meters)    Stage 2 Mild CKD (GFR = 60-89 mL/min/1.73 square meters)    Stage 3A Moderate CKD (GFR = 45-59 mL/min/1.73 square meters)    Stage 3B Moderate CKD (GFR = 30-44 mL/min/1.73 square meters)    Stage 4 Severe CKD (GFR = 15-29 mL/min/1.73 square meters)    Stage 5 End Stage CKD (GFR <15 mL/min/1.73 square meters)  Note: GFR calculation is accurate only with a steady state creatinine    Magnesium [[id number]]  (Normal) Collected: 01/18/24 1712    Lab Status: Final result Specimen: Blood from Arm, Left Updated: 01/18/24 1756     Magnesium 2.0 mg/dL     Protime-INR [[id number]]  (Normal) Collected: 01/18/24 9215    Lab Status: Final result Specimen: Blood from Arm,  Left Updated: 01/18/24 1749     Protime 14.4 seconds      INR 1.06    APTT [[id number]]  (Normal) Collected: 01/18/24 1712    Lab Status: Final result Specimen: Blood from Arm, Left Updated: 01/18/24 1749     PTT 32 seconds     Blood culture #2 [[id number]] Collected: 01/18/24 1741    Lab Status: In process Specimen: Blood from Arm, Right Updated: 01/18/24 1749    CBC and differential [[id number]]  (Abnormal) Collected: 01/18/24 1712    Lab Status: Final result Specimen: Blood from Arm, Left Updated: 01/18/24 1736     WBC 8.37 Thousand/uL      RBC 4.28 Million/uL      Hemoglobin 13.6 g/dL      Hematocrit 39.1 %      MCV 91 fL      MCH 31.8 pg      MCHC 34.8 g/dL      RDW 12.8 %      MPV 9.5 fL      Platelets 187 Thousands/uL      nRBC 0 /100 WBCs      Neutrophils Relative 76 %      Immat GRANS % 1 %      Lymphocytes Relative 12 %      Monocytes Relative 10 %      Eosinophils Relative 1 %      Basophils Relative 0 %      Neutrophils Absolute 6.36 Thousands/µL      Immature Grans Absolute 0.04 Thousand/uL      Lymphocytes Absolute 1.00 Thousands/µL      Monocytes Absolute 0.82 Thousand/µL      Eosinophils Absolute 0.12 Thousand/µL      Basophils Absolute 0.03 Thousands/µL     Blood culture #1 [[id number]] Collected: 01/18/24 1712    Lab Status: In process Specimen: Blood from Arm, Left Updated: 01/18/24 1733                   XR chest 1 view portable   ED Interpretation by Julie Lynn Gutzweiler, PA-C (01/18 1915)                 Procedures  ECG 12 Lead Documentation Only    Date/Time: 1/18/2024 7:33 PM    Performed by: Julie Lynn Gutzweiler, PA-C  Authorized by: Julie Lynn Gutzweiler, PA-C    Indications / Diagnosis:  URI, hypoxia  ECG reviewed by me, the ED Provider: yes    Patient location:  ED  Previous ECG:     Previous ECG:  Unavailable    Comparison to cardiac monitor: Yes    Interpretation:     Interpretation: abnormal    Rate:     ECG rate:  97    ECG rate assessment: normal    Rhythm:     Rhythm: sinus rhythm     Ectopy:     Ectopy: none    QRS:     QRS axis:  Left    QRS intervals:  Normal  Conduction:     Conduction: normal    ST segments:     ST segments:  Normal  T waves:     T waves: normal             ED Course             HEART Risk Score      Flowsheet Row Most Recent Value   Heart Score Risk Calculator    History 0 Filed at: 01/18/2024 2011   ECG 1 Filed at: 01/18/2024 2011   Age 2 Filed at: 01/18/2024 2011   Risk Factors 1 Filed at: 01/18/2024 2011   Troponin 0 Filed at: 01/18/2024 2011   HEART Score 4 Filed at: 01/18/2024 2011                          SBIRT 20yo+      Flowsheet Row Most Recent Value   Initial Alcohol Screen: US AUDIT-C     1. How often do you have a drink containing alcohol? 0 Filed at: 01/18/2024 1634   2. How many drinks containing alcohol do you have on a typical day you are drinking?  0 Filed at: 01/18/2024 1634   3a. Male UNDER 65: How often do you have five or more drinks on one occasion? 0 Filed at: 01/18/2024 1634   3b. FEMALE Any Age, or MALE 65+: How often do you have 4 or more drinks on one occassion? 0 Filed at: 01/18/2024 1634   Audit-C Score 0 Filed at: 01/18/2024 1634   HODAN: How many times in the past year have you...    Used an illegal drug or used a prescription medication for non-medical reasons? Never Filed at: 01/18/2024 1634                      Medical Decision Making  68-year-old male presents emergency room by philip.  He for the past 4 days he has been having upper respiratory symptoms.  He complains of fever, nasal congestion, postnasal drip and a nonproductive cough.  He states over the past 2 days he has had increasing shortness of breath.  He was seen by his family physician today he was diagnosed with bronchitis.  He states he did a COVID test at home that was normal.  His doctor recommended that he come to the hospital to be evaluated.  Patient refused.  He was given an outpatient slip to have a chest x-ray.  Patient noticed after leaving the doctor's office that  his shortness of breath became progressively worse.  He called 911 and he presents to the hospital.  He is not on any in any extremis.  He was placed on 3 L by the of the emergency squad.  He was taken off of 3 L and given a trial without oxygenation and his sats were 86%.  On 3 L he is satting 99%.  He denies any chest pain.  He denies any abdominal pain.  He denies any black tarry stools or bright red blood per rectum.  He denies any urinary frequency, urgency, hematuria.    Past medical history is positive for hypertension, hyperlipidemia, and impaired fasting glucose, osteoarthritis both knees.    Physical exam the patient's blood pressure is 1 74/9 5, temp is 98.2, heart rate is 93, respiratory rate is 26 and he is satting 95% on 3 L.  Patient has clear rhinorrhea.  His posterior pharynx is nonerythematous with positive postnasal drip.  His neck is supple upon palpation without lymphadenopathy or thyroid megaly.  His lungs have bilateral rhonchi scattered in the left base with expiratory wheezes throughout.  He has no rales auscultated.  His heart is a regular rate and rhythm without murmur.  His abdomen is obese, soft nondistended nontender without mass or hepatosplenomegaly.  He has no peripheral edema.    Laboratory studies were taken and were reviewed.  Patient's RSV came back positive.  I did give him a dose of Solu-Medrol.  I will print an albuterol inhaler with a spacer and personally showed him how to use this, 2 puffs every 4 hours as needed for cough or wheezing.  His EKG showed a left axis deviation but no acute signs of ischemia.    Impression  RSV  Hypoxia  Acute bronchitis with bronchospasm  Hyperlipidemia  Hypertension    Plan-  Patient was admitted to the [facility] service for further evaluation and management.    Amount and/or Complexity of Data Reviewed  Labs: ordered.     Details: Independently interpreted by me  Radiology: ordered and independent interpretation performed.     Details:  Independently interpreted by me, no acute cardiopulmonary disease  ECG/medicine tests: ordered and independent interpretation performed.     Details: Left axis deviation, no acute signs of ischemia.  Normal rate and rhythm without ectopy.  Independently interpreted by me    Risk  Prescription drug management.             Disposition  Final diagnoses:   RSV (acute bronchiolitis due to respiratory syncytial virus)   Hypoxia   Acute bronchitis with bronchospasm   Hypertension   Hyperlipidemia     Time reflects when diagnosis was documented in both MDM as applicable and the Disposition within this note       Time User Action Codes Description Comment    1/18/2024  8:25 PM Gutzweiler, Julie Add [J21.0] RSV (acute bronchiolitis due to respiratory syncytial virus)     1/18/2024  8:25 PM Gutzweiler, Julie Add [R09.02] Hypoxia     1/18/2024  8:25 PM Gutzweiler, Julie Add [J20.9] Acute bronchitis with bronchospasm     1/18/2024  8:25 PM Gutzweiler, Julie Add [I10] Hypertension     1/18/2024  8:25 PM Gutzweiler, Julie Add [E78.5] Hyperlipidemia           ED Disposition       None          Follow-up Information    None         Patient's Medications   Discharge Prescriptions    No medications on file       No discharge procedures on file.    PDMP Review       None            ED Provider  Electronically Signed by             Julie Lynn Gutzweiler, PA-C  01/18/24 2045

## 2024-01-18 NOTE — PROGRESS NOTES
Assessment/Plan:     Diagnoses and all orders for this visit:    Acute bronchitis, unspecified organism  -     azithromycin (ZITHROMAX) 250 mg tablet; Take 2 tabs on Day 1 than 1 tab Days 2-5  -     benzonatate (TESSALON PERLES) 100 mg capsule; Take 1 capsule (100 mg total) by mouth 3 (three) times a day as needed for cough    Shortness of breath  -     XR chest pa & lateral; Future    Flu-like symptoms  -     POCT Rapid Covid Ag        #1 Acute bronchitis, unspecified organism  Discussed with patient plan to treat with a course of azithromycin and Tessolon Perles  #2 Shortness of breath  Discussed with patient plan to have him go for a STAT chest x-ray  #3 Flu-like symptoms  Due to patient's symptom presentation a rapid test performed with a negative result  Discussed with patient that he is possibly to early for the test to be positive so recommend repeating the test in two to three days.  Patient instructed to call if no improvement in 72 hours or symptoms worsen    Subjective:      Patient ID: Markie Barrios is a 68 y.o. male.    68 y.o.male presenting with sore throat, cough, fatigue and shortness of breath for the past two days. He reports doing a COVID home test last night with negative results. He as been using cough drops to manage the cough without releif. He reports that his chest and abdomen hurt from so much coughing. His oxygen saturation is low and he is short of breath at rest so discussed with patient recommendation to go to emergency room for further evaluation but patient refused. So will order STAT chest x-ray and treat accordingly.      The following portions of the patient's history were reviewed and updated as appropriate: allergies, current medications, past family history, past medical history, past social history, past surgical history, and problem list.    Review of Systems   Constitutional:  Positive for fatigue. Negative for chills and fever.   HENT:  Positive for congestion and sore  "throat. Negative for ear pain, postnasal drip, rhinorrhea, sinus pressure and sinus pain.    Respiratory:  Positive for cough, shortness of breath and wheezing. Negative for chest tightness.    Cardiovascular: Negative.    Gastrointestinal:  Negative for abdominal distention, abdominal pain, diarrhea, nausea and vomiting.   Musculoskeletal:  Positive for myalgias.   Neurological:  Positive for headaches.   Psychiatric/Behavioral: Negative.         Objective:    /78   Pulse 105   Temp 98.2 °F (36.8 °C)   Ht 6' 1\" (1.854 m)   Wt 56.7 kg (125 lb)   SpO2 90%   BMI 16.49 kg/m² (Reviewed)     Physical Exam  Vitals reviewed.   Constitutional:       General: He is in acute distress.      Appearance: He is well-developed and well-groomed. He is ill-appearing.   HENT:      Head: Normocephalic and atraumatic.      Right Ear: External ear normal.      Left Ear: External ear normal.      Nose: Congestion present.      Mouth/Throat:      Mouth: Mucous membranes are moist.      Pharynx: Oropharynx is clear. Posterior oropharyngeal erythema present.   Eyes:      General: Lids are normal.      Extraocular Movements: Extraocular movements intact.      Conjunctiva/sclera: Conjunctivae normal.      Pupils: Pupils are equal, round, and reactive to light.   Neck:      Trachea: Trachea and phonation normal.   Cardiovascular:      Rate and Rhythm: Normal rate and regular rhythm.      Heart sounds: Normal heart sounds.   Pulmonary:      Effort: Respiratory distress present.      Breath sounds: Wheezing, rhonchi and rales present.   Musculoskeletal:      Cervical back: Neck supple.   Lymphadenopathy:      Cervical: No cervical adenopathy.   Skin:     General: Skin is warm and dry.      Capillary Refill: Capillary refill takes less than 2 seconds.   Neurological:      Mental Status: He is alert and oriented to person, place, and time.   Psychiatric:         Mood and Affect: Mood normal.         Behavior: Behavior normal. Behavior " is cooperative.

## 2024-01-19 LAB
ATRIAL RATE: 97 BPM
P AXIS: 57 DEGREES
PR INTERVAL: 190 MS
PROCALCITONIN SERPL-MCNC: 0.12 NG/ML
QRS AXIS: -36 DEGREES
QRSD INTERVAL: 92 MS
QT INTERVAL: 354 MS
QTC INTERVAL: 449 MS
T WAVE AXIS: 62 DEGREES
VENTRICULAR RATE: 97 BPM

## 2024-01-19 PROCEDURE — 99232 SBSQ HOSP IP/OBS MODERATE 35: CPT | Performed by: STUDENT IN AN ORGANIZED HEALTH CARE EDUCATION/TRAINING PROGRAM

## 2024-01-19 PROCEDURE — 94760 N-INVAS EAR/PLS OXIMETRY 1: CPT

## 2024-01-19 PROCEDURE — 94664 DEMO&/EVAL PT USE INHALER: CPT

## 2024-01-19 PROCEDURE — 94640 AIRWAY INHALATION TREATMENT: CPT

## 2024-01-19 RX ORDER — HYDROCHLOROTHIAZIDE 25 MG/1
25 TABLET ORAL DAILY
Status: DISCONTINUED | OUTPATIENT
Start: 2024-01-19 | End: 2024-01-23 | Stop reason: HOSPADM

## 2024-01-19 RX ORDER — BENZONATATE 100 MG/1
100 CAPSULE ORAL 3 TIMES DAILY PRN
Status: DISCONTINUED | OUTPATIENT
Start: 2024-01-19 | End: 2024-01-23 | Stop reason: HOSPADM

## 2024-01-19 RX ORDER — ENOXAPARIN SODIUM 100 MG/ML
30 INJECTION SUBCUTANEOUS DAILY
Status: DISCONTINUED | OUTPATIENT
Start: 2024-01-19 | End: 2024-01-23 | Stop reason: HOSPADM

## 2024-01-19 RX ORDER — METHYLPREDNISOLONE SODIUM SUCCINATE 40 MG/ML
40 INJECTION, POWDER, LYOPHILIZED, FOR SOLUTION INTRAMUSCULAR; INTRAVENOUS EVERY 8 HOURS SCHEDULED
Status: DISCONTINUED | OUTPATIENT
Start: 2024-01-19 | End: 2024-01-22

## 2024-01-19 RX ORDER — ACETAMINOPHEN 325 MG/1
650 TABLET ORAL EVERY 6 HOURS PRN
Status: DISCONTINUED | OUTPATIENT
Start: 2024-01-19 | End: 2024-01-23 | Stop reason: HOSPADM

## 2024-01-19 RX ORDER — IPRATROPIUM BROMIDE AND ALBUTEROL SULFATE 2.5; .5 MG/3ML; MG/3ML
3 SOLUTION RESPIRATORY (INHALATION) EVERY 6 HOURS PRN
Status: DISCONTINUED | OUTPATIENT
Start: 2024-01-19 | End: 2024-01-23 | Stop reason: HOSPADM

## 2024-01-19 RX ORDER — LISINOPRIL 20 MG/1
40 TABLET ORAL DAILY
Status: DISCONTINUED | OUTPATIENT
Start: 2024-01-19 | End: 2024-01-23 | Stop reason: HOSPADM

## 2024-01-19 RX ORDER — GUAIFENESIN 600 MG/1
600 TABLET, EXTENDED RELEASE ORAL EVERY 12 HOURS SCHEDULED
Status: DISCONTINUED | OUTPATIENT
Start: 2024-01-19 | End: 2024-01-23 | Stop reason: HOSPADM

## 2024-01-19 RX ORDER — TAMSULOSIN HYDROCHLORIDE 0.4 MG/1
0.8 CAPSULE ORAL
Status: DISCONTINUED | OUTPATIENT
Start: 2024-01-19 | End: 2024-01-23 | Stop reason: HOSPADM

## 2024-01-19 RX ADMIN — METHYLPREDNISOLONE SODIUM SUCCINATE 40 MG: 40 INJECTION, POWDER, FOR SOLUTION INTRAMUSCULAR; INTRAVENOUS at 15:52

## 2024-01-19 RX ADMIN — LISINOPRIL 40 MG: 20 TABLET ORAL at 10:09

## 2024-01-19 RX ADMIN — METHYLPREDNISOLONE SODIUM SUCCINATE 40 MG: 40 INJECTION, POWDER, FOR SOLUTION INTRAMUSCULAR; INTRAVENOUS at 05:37

## 2024-01-19 RX ADMIN — GUAIFENESIN 600 MG: 600 TABLET ORAL at 10:33

## 2024-01-19 RX ADMIN — GUAIFENESIN 600 MG: 600 TABLET ORAL at 21:38

## 2024-01-19 RX ADMIN — IPRATROPIUM BROMIDE AND ALBUTEROL SULFATE 3 ML: .5; 3 SOLUTION RESPIRATORY (INHALATION) at 16:27

## 2024-01-19 RX ADMIN — METHYLPREDNISOLONE SODIUM SUCCINATE 40 MG: 40 INJECTION, POWDER, FOR SOLUTION INTRAMUSCULAR; INTRAVENOUS at 21:38

## 2024-01-19 RX ADMIN — ENOXAPARIN SODIUM 30 MG: 30 INJECTION SUBCUTANEOUS at 10:09

## 2024-01-19 RX ADMIN — HYDROCHLOROTHIAZIDE 25 MG: 25 TABLET ORAL at 10:09

## 2024-01-19 RX ADMIN — TAMSULOSIN HYDROCHLORIDE 0.8 MG: 0.4 CAPSULE ORAL at 15:52

## 2024-01-19 NOTE — PLAN OF CARE
Problem: PAIN - ADULT  Goal: Verbalizes/displays adequate comfort level or baseline comfort level  Description: Interventions:  - Encourage patient to monitor pain and request assistance  - Assess pain using appropriate pain scale  - Administer analgesics based on type and severity of pain and evaluate response  - Implement non-pharmacological measures as appropriate and evaluate response  - Consider cultural and social influences on pain and pain management  - Notify physician/advanced practitioner if interventions unsuccessful or patient reports new pain  Outcome: Progressing     Problem: INFECTION - ADULT  Goal: Absence or prevention of progression during hospitalization  Description: INTERVENTIONS:  - Assess and monitor for signs and symptoms of infection  - Monitor lab/diagnostic results  - Monitor all insertion sites, i.e. indwelling lines, tubes, and drains  - Monitor endotracheal if appropriate and nasal secretions for changes in amount and color  - [place] appropriate cooling/warming therapies per order  - Administer medications as ordered  - Instruct and encourage patient and family to use good hand hygiene technique  - Identify and instruct in appropriate isolation precautions for identified infection/condition  Outcome: Progressing  Goal: Absence of fever/infection during neutropenic period  Description: INTERVENTIONS:  - Monitor WBC    Outcome: Progressing     Problem: SAFETY ADULT  Goal: Patient will remain free of falls  Description: INTERVENTIONS:  - Educate patient/family on patient safety including physical limitations  - Instruct patient to call for assistance with activity   - Consult OT/PT to assist with strengthening/mobility   - Keep Call bell within reach  - Keep bed low and locked with side rails adjusted as appropriate  - Keep care items and personal belongings within reach  - Initiate and maintain comfort rounds  - Make Fall Risk Sign visible to staff  - Offer Toileting every  Hours,  in advance of need  - Initiate/Maintain alarm  - Obtain necessary fall risk management equipment:    - Apply yellow socks and bracelet for high fall risk patients  - Consider moving patient to room near nurses station  Outcome: Progressing  Goal: Maintain or return to baseline ADL function  Description: INTERVENTIONS:  -  Assess patient's ability to carry out ADLs; assess patient's baseline for ADL function and identify physical deficits which impact ability to perform ADLs (bathing, care of mouth/teeth, toileting, grooming, dressing, etc.)  - Assess/evaluate cause of self-care deficits   - Assess range of motion  - Assess patient's mobility; develop plan if impaired  - Assess patient's need for assistive devices and provide as appropriate  - Encourage maximum independence but intervene and supervise when necessary  - Involve family in performance of ADLs  - Assess for home care needs following discharge   - Consider OT consult to assist with ADL evaluation and planning for discharge  - Provide patient education as appropriate  Outcome: Progressing  Goal: Maintains/Returns to pre admission functional level  Description: INTERVENTIONS:  - Perform AM-PAC 6 Click Basic Mobility/ Daily Activity assessment daily.  - Set and communicate daily mobility goal to care team and patient/family/caregiver.   - Collaborate with rehabilitation services on mobility goals if consulted  - Perform Range of Motion  times a day.  - Reposition patient every  hours.  - Dangle patient  times a day  - Stand patient  times a day  - Ambulate patient times a day  - Out of bed to chair  times a day   - Out of bed for meals  times a day  - Out of bed for toileting  - Record patient progress and toleration of activity level   Outcome: Progressing     Problem: DISCHARGE PLANNING  Goal: Discharge to home or other facility with appropriate resources  Description: INTERVENTIONS:  - Identify barriers to discharge w/patient and caregiver  - Arrange for  needed discharge resources and transportation as appropriate  - Identify discharge learning needs (meds, wound care, etc.)  - Arrange for interpretive services to assist at discharge as needed  - Refer to Case Management Department for coordinating discharge planning if the patient needs post-hospital services based on physician/advanced practitioner order or complex needs related to functional status, cognitive ability, or social support system  Outcome: Progressing     Problem: Knowledge Deficit  Goal: Patient/family/caregiver demonstrates understanding of disease process, treatment plan, medications, and discharge instructions  Description: Complete learning assessment and assess knowledge base.  Interventions:  - Provide teaching at level of understanding  - Provide teaching via preferred learning methods  Outcome: Progressing

## 2024-01-19 NOTE — ASSESSMENT & PLAN NOTE
Continue to monitor blood pressure at this time  Continue lisinopril 40 mg daily, HCTZ 25 mg daily

## 2024-01-19 NOTE — PLAN OF CARE
Problem: PAIN - ADULT  Goal: Verbalizes/displays adequate comfort level or baseline comfort level  Description: Interventions:  - Encourage patient to monitor pain and request assistance  - Assess pain using appropriate pain scale  - Administer analgesics based on type and severity of pain and evaluate response  - Implement non-pharmacological measures as appropriate and evaluate response  - Consider cultural and social influences on pain and pain management  - Notify physician/advanced practitioner if interventions unsuccessful or patient reports new pain  Outcome: Progressing     Problem: INFECTION - ADULT  Goal: Absence or prevention of progression during hospitalization  Description: INTERVENTIONS:  - Assess and monitor for signs and symptoms of infection  - Monitor lab/diagnostic results  - Monitor all insertion sites, i.e. indwelling lines, tubes, and drains  - Monitor endotracheal if appropriate and nasal secretions for changes in amount and color  - [place] appropriate cooling/warming therapies per order  - Administer medications as ordered  - Instruct and encourage patient and family to use good hand hygiene technique  - Identify and instruct in appropriate isolation precautions for identified infection/condition  Outcome: Progressing  Goal: Absence of fever/infection during neutropenic period  Description: INTERVENTIONS:  - Monitor WBC    Outcome: Progressing     Problem: SAFETY ADULT  Goal: Patient will remain free of falls  Description: INTERVENTIONS:  - Educate patient/family on patient safety including physical limitations  - Instruct patient to call for assistance with activity   - Consult OT/PT to assist with strengthening/mobility   - Keep Call bell within reach  - Keep bed low and locked with side rails adjusted as appropriate  - Keep care items and personal belongings within reach  - Initiate and maintain comfort rounds  - Make Fall Risk Sign visible to staff  - Offer Toileting every  Hours,  in advance of need  - Initiate/Maintain alarm  - Obtain necessary fall risk management equipment:    - Apply yellow socks and bracelet for high fall risk patients  - Consider moving patient to room near nurses station  Outcome: Progressing  Goal: Maintain or return to baseline ADL function  Description: INTERVENTIONS:  -  Assess patient's ability to carry out ADLs; assess patient's baseline for ADL function and identify physical deficits which impact ability to perform ADLs (bathing, care of mouth/teeth, toileting, grooming, dressing, etc.)  - Assess/evaluate cause of self-care deficits   - Assess range of motion  - Assess patient's mobility; develop plan if impaired  - Assess patient's need for assistive devices and provide as appropriate  - Encourage maximum independence but intervene and supervise when necessary  - Involve family in performance of ADLs  - Assess for home care needs following discharge   - Consider OT consult to assist with ADL evaluation and planning for discharge  - Provide patient education as appropriate  Outcome: Progressing  Goal: Maintains/Returns to pre admission functional level  Description: INTERVENTIONS:  - Perform AM-PAC 6 Click Basic Mobility/ Daily Activity assessment daily.  - Set and communicate daily mobility goal to care team and patient/family/caregiver.   - Collaborate with rehabilitation services on mobility goals if consulted  - Perform Range of Motion  times a day.  - Reposition patient every  hours.  - Dangle patient  times a day  - Stand patient  times a day  - Ambulate patient times a day  - Out of bed to chair  times a day   - Out of bed for meals  times a day  - Out of bed for toileting  - Record patient progress and toleration of activity level   Outcome: Progressing     Problem: DISCHARGE PLANNING  Goal: Discharge to home or other facility with appropriate resources  Description: INTERVENTIONS:  - Identify barriers to discharge w/patient and caregiver  - Arrange for  needed discharge resources and transportation as appropriate  - Identify discharge learning needs (meds, wound care, etc.)  - Arrange for interpretive services to assist at discharge as needed  - Refer to Case Management Department for coordinating discharge planning if the patient needs post-hospital services based on physician/advanced practitioner order or complex needs related to functional status, cognitive ability, or social support system  Outcome: Progressing     Problem: Knowledge Deficit  Goal: Patient/family/caregiver demonstrates understanding of disease process, treatment plan, medications, and discharge instructions  Description: Complete learning assessment and assess knowledge base.  Interventions:  - Provide teaching at level of understanding  - Provide teaching via preferred learning methods  Outcome: Progressing

## 2024-01-19 NOTE — ASSESSMENT & PLAN NOTE
SIRS criteria: Tachycardia, tachypnea  Suspected source: RSV Bronchitis.   Suspect likely due to RSV, monitor off antibiotics  Follow-up with blood cultures

## 2024-01-19 NOTE — RESPIRATORY THERAPY NOTE
RT Protocol Note  Markie Barrios 68 y.o. male MRN: [id number]  Unit/Bed#: W -01 Encounter: [id number]    Assessment    Principal Problem:    Acute respiratory failure (HCC)  Active Problems:    Benign essential hypertension    BPH (benign prostatic hyperplasia)    Sepsis (HCC)    RSV bronchitis      Home Pulmonary Medications:  Pt denies any home pulmonary medications  Home Devices/Therapy: Other (Comment) (none)    Past Medical History:   Diagnosis Date    BPH (benign prostatic hyperplasia)     Dyslipidemia     Hypertension     Impaired fasting glucose     Osteoarthritis of both knees      Social History     Socioeconomic History    Marital status: Unknown     Spouse name: None    Number of children: None    Years of education: None    Highest education level: None   Occupational History    Occupation: retired   Tobacco Use    Smoking status: Never    Smokeless tobacco: Never   Vaping Use    Vaping status: Never Used   Substance and Sexual Activity    Alcohol use: Not Currently     Comment: occasional    Drug use: Never    Sexual activity: Not Currently   Other Topics Concern    None   Social History Narrative    None     Social Determinants of Health     Financial Resource Strain: Low Risk  (1/4/2024)    Overall Financial Resource Strain (CARDIA)     Difficulty of Paying Living Expenses: Not hard at all   Food Insecurity: Not on file   Transportation Needs: No Transportation Needs (1/4/2024)    PRAPARE - Transportation     Lack of Transportation (Medical): No     Lack of Transportation (Non-Medical): No   Physical Activity: Not on file   Stress: Not on file   Social Connections: Not on file   Intimate Partner Violence: Not on file   Housing Stability: Not on file       Subjective         Objective    Physical Exam:   Assessment Type: Pre-treatment  General Appearance: Alert, Awake  Respiratory Pattern: Dyspnea with exertion  Chest Assessment: Chest expansion symmetrical  Bilateral Breath Sounds:  "Diminished  Cough: Dry  O2 Device: 6L NC    Vitals:  Blood pressure 154/90, pulse 98, temperature 97.7 °F (36.5 °C), resp. rate 12, height 6' 2\" (1.88 m), weight 130 kg (286 lb), SpO2 94%.          Imaging and other studies: I have personally reviewed pertinent reports.      O2 Device: 6L NC     Plan    Respiratory Plan: Mild Distress pathway  Airway Clearance Plan: Discontinue Protocol     Resp Comments: Pt denies any significant pulmonary hx, no hx of asthma or copd. pt denies home nebulizers, inhalers and oxygen. Pt does not have a smoking history. Pt states that he is SOB when moving around.   "

## 2024-01-19 NOTE — ASSESSMENT & PLAN NOTE
SIRS criteria: Tachycardia, tachypnea  Suspected source: RSV Bronchitis.   Lactic acid: 0.8  End organ damage: None  IV Fluids: Received 1 L NSS in ED.  Monitor off antibiotics as suspected source is viral in etiology.  Follow up on culture results.  Monitor vital signs, laboratory studies.

## 2024-01-19 NOTE — ASSESSMENT & PLAN NOTE
Patient reports congestion, dry cough, fatigue and rhinorrhea for the past 4 days. Patient was originally seen by his outpatient PCP this morning who recommended patient present to the ED for further evaluation at which time patient refused. Patient returned home and developed worsening shortness of breath which prompted the patient to call for an ambulance.   RSV+ on 01/18/2024  Continue IV Solu-Medrol, notable wheezes noted on exam  Continue guaifenesin, Tessalon Perles as needed  Currently requiring 6 L, wean as able  Repeat procalcitonin, monitor off antibiotics this time

## 2024-01-19 NOTE — PROGRESS NOTES
[facility]  Progress Note  Name: Markie Barrios I  MRN: [id number]  Unit/Bed#: W -01 I Date of Admission: 1/18/2024   Date of Service: 1/19/2024 I Hospital Day: 1    Assessment/Plan   RSV bronchitis  Assessment & Plan  Patient reports congestion, dry cough, fatigue and rhinorrhea for the past 4 days. Patient was originally seen by his outpatient PCP this morning who recommended patient present to the ED for further evaluation at which time patient refused. Patient returned home and developed worsening shortness of breath which prompted the patient to call for an ambulance.   RSV+ on 01/18/2024  Continue IV Solu-Medrol, notable wheezes noted on exam  Continue guaifenesin, Tessalon Perles as needed  Currently requiring 6 L, wean as able  Repeat procalcitonin, monitor off antibiotics this time    Sepsis (HCC)  Assessment & Plan  SIRS criteria: Tachycardia, tachypnea  Suspected source: RSV Bronchitis.   Suspect likely due to RSV, monitor off antibiotics  Follow-up with blood cultures    BPH (benign prostatic hyperplasia)  Assessment & Plan  Continue Flomax    Benign essential hypertension  Assessment & Plan  Continue to monitor blood pressure at this time  Continue lisinopril 40 mg daily, HCTZ 25 mg daily    * Acute respiratory failure (HCC)  Assessment & Plan  In the setting of acute RSV infection, and likely underlying LE/OHS  Currently on 6 L nasal cannula, wean oxygen as able  Will likely require home O2 eval prior to discharge           VTE Pharmacologic Prophylaxis:   Pharmacologic: Enoxaparin (Lovenox)  Mechanical VTE Prophylaxis in Place: Yes    Discussions with Specialists or Other Care Team Provider: STAN    Current Length of Stay: 1 day(s)    Current Patient Status: Inpatient   Certification Statement: The patient will continue to require additional inpatient hospital stay due to IV steroids    Discharge Plan: pending    Code Status: Level 1 - Full Code      Subjective:    Continues to have cough, without sputum production.  Denies any fevers or chills.  Tolerating diet.    Objective:     Vitals:   Temp (24hrs), Av.3 °F (36.8 °C), Min:98 °F (36.7 °C), Max:98.8 °F (37.1 °C)    Temp:  [98 °F (36.7 °C)-98.8 °F (37.1 °C)] 98 °F (36.7 °C)  HR:  [] 101  Resp:  [18-26] 18  BP: (158-176)/(85-95) 161/94  SpO2:  [86 %-95 %] 90 %  Body mass index is 36.72 kg/m².     Input and Output Summary (last 24 hours):       Intake/Output Summary (Last 24 hours) at 2024 1141  Last data filed at 2024 0550  Gross per 24 hour   Intake 1480 ml   Output --   Net 1480 ml       Physical Exam:     Physical Exam  Vitals and nursing note reviewed.   Constitutional:       Appearance: Normal appearance. He is obese.   HENT:      Head: Normocephalic.   Eyes:      Conjunctiva/sclera: Conjunctivae normal.   Cardiovascular:      Rate and Rhythm: Normal rate.   Pulmonary:      Effort: Pulmonary effort is normal.      Breath sounds: Wheezing present.   Abdominal:      General: Bowel sounds are normal. There is no distension.      Palpations: Abdomen is soft.   Musculoskeletal:         General: No swelling.      Right lower leg: No edema.      Left lower leg: No edema.   Skin:     General: Skin is warm and dry.   Neurological:      General: No focal deficit present.      Mental Status: He is alert. Mental status is at baseline.         Additional Data:     Labs:    Results from last 7 days   Lab Units 24  1712   WBC Thousand/uL 8.37   HEMOGLOBIN g/dL 13.6   HEMATOCRIT % 39.1   PLATELETS Thousands/uL 187   NEUTROS PCT % 76*   LYMPHS PCT % 12*   MONOS PCT % 10   EOS PCT % 1     Results from last 7 days   Lab Units 24  1712   SODIUM mmol/L 135   POTASSIUM mmol/L 3.4*   CHLORIDE mmol/L 100   CO2 mmol/L 25   BUN mg/dL 23   CREATININE mg/dL 0.94   ANION GAP mmol/L 10   CALCIUM mg/dL 9.5   ALBUMIN g/dL 4.1   TOTAL BILIRUBIN mg/dL 0.66   ALK PHOS U/L 44   ALT U/L 27   AST U/L 18   GLUCOSE RANDOM  mg/dL 114     Results from last 7 days   Lab Units 01/18/24  1712   INR  1.06             Results from last 7 days   Lab Units 01/18/24  1712   LACTIC ACID mmol/L 0.8   PROCALCITONIN ng/ml 0.12           * I Have Reviewed All Lab Data Listed Above.  * Additional Pertinent Lab Tests Reviewed: All Labs For Current Hospital Admission Reviewed    Mobility:  Basic Mobility Inpatient Raw Score: 23  -[facility] Goal: 7: Walk 25 feet or more  -HL Achieved: 6: Walk 10 steps or more    Lines:     Invasive Devices       Peripheral Intravenous Line  Duration             Peripheral IV 01/18/24 Distal;Left;Upper;Ventral (anterior) Arm <1 day                       Imaging:    Imaging Reports Reviewed Today Include:     XR chest 1 view portable    Result Date: 1/19/2024  Impression: No acute cardiopulmonary disease. Workstation performed: [id number]     XR chest pa & lateral    Result Date: 1/18/2024  Impression: No acute cardiopulmonary disease. Workstation performed: [id number]        Recent Cultures (last 7 days):     Results from last 7 days   Lab Units 01/18/24  1741 01/18/24  1712   BLOOD CULTURE  Received in Microbiology Lab. Culture in Progress. Received in Microbiology Lab. Culture in Progress.       Last 24 Hours Medication List:   Current Facility-Administered Medications   Medication Dose Route Frequency Provider Last Rate    acetaminophen  650 mg Oral Q6H PRN TEE [place]      benzonatate  100 mg Oral TID PRN TEE [place]      enoxaparin  30 mg Subcutaneous Daily TEE [place]      guaiFENesin  600 mg Oral Q12H [facility] Andrew Riddle MD      hydrochlorothiazide  25 mg Oral Daily TEE [place]      lisinopril  40 mg Oral Daily TEE [place]      methylPREDNISolone sodium succinate  40 mg Intravenous Q8H NOEMI TEE [place]      tamsulosin  0.8 mg Oral Daily With Dinner TEE [place]          Today, Patient Was Seen By: Andrew Riddle MD    ** Please Note:  Dictation voice to text software may have been used in the creation of this document. **

## 2024-01-19 NOTE — ASSESSMENT & PLAN NOTE
Patient reports congestion, dry cough, fatigue and rhinorrhea for the past 4 days. Patient was originally seen by his outpatient PCP this morning who recommended patient present to the ED for further evaluation at which time patient refused. Patient returned home and developed worsening shortness of breath which prompted the patient to call for an ambulance.   RSV+ on 01/18/2024  Received 80 mg of IV Solu-Medrol in ED. Continue 40 mg of IV Solu-Medrol q8h.  Tessalon perles.  Respiratory protocol.

## 2024-01-19 NOTE — ASSESSMENT & PLAN NOTE
2/2 acute RSV  Respiratory protocol.  Currently requiring 6 L NC, SpO2 91-95%.  Wean supplemental oxygen as able.  Monitor respiratory status.

## 2024-01-19 NOTE — ASSESSMENT & PLAN NOTE
In the setting of acute RSV infection, and likely underlying LE/OHS  Currently on 6 L nasal cannula, wean oxygen as able  Will likely require home O2 eval prior to discharge  Would recommend to follow-up with pulmonary outpatient, obtain sleep study

## 2024-01-19 NOTE — H&P
[facility]  H&P  Name: Markie Barrios 68 y.o. male I MRN: [id number]  Unit/Bed#: W -01 I Date of Admission: 1/18/2024   Date of Service: 1/19/2024 I Hospital Day: 1      Assessment/Plan   * Acute respiratory failure (HCC)  Assessment & Plan  2/2 acute RSV  Respiratory protocol.  Currently requiring 6 L NC, SpO2 91-95%.  Wean supplemental oxygen as able.  Monitor respiratory status.    Sepsis (HCC)  Assessment & Plan  SIRS criteria: Tachycardia, tachypnea  Suspected source: RSV Bronchitis.   Lactic acid: 0.8  End organ damage: None  IV Fluids: Received 1 L NSS in ED.  Monitor off antibiotics as suspected source is viral in etiology.  Follow up on culture results.  Monitor vital signs, laboratory studies.    RSV bronchitis  Assessment & Plan  Patient reports congestion, dry cough, fatigue and rhinorrhea for the past 4 days. Patient was originally seen by his outpatient PCP this morning who recommended patient present to the ED for further evaluation at which time patient refused. Patient returned home and developed worsening shortness of breath which prompted the patient to call for an ambulance.   RSV+ on 01/18/2024  Received 80 mg of IV Solu-Medrol in ED. Continue 40 mg of IV Solu-Medrol q8h.  Tessalon perles.  Respiratory protocol.    Benign essential hypertension  Assessment & Plan  Blood pressure is reviewed and acceptable.  Last recorded pressure: 162/94  Continue ACE-I and HCTZ.  Monitor blood pressure.    BPH (benign prostatic hyperplasia)  Assessment & Plan  Continue Flomax.         VTE Pharmacologic Prophylaxis: VTE Score: 7 High Risk (Score >/= 5) - Pharmacological DVT Prophylaxis Ordered: heparin. Sequential Compression Devices Ordered.  Code Status: Level 1 - Full Code per patient  Discussion with family: Patient declined call to .     Anticipated Length of Stay: Patient will be admitted on an inpatient basis with an anticipated length of stay of greater  than 2 midnights secondary to acute respiratory failure 2/2 RSV, sepsis.    Total Time Spent on Date of Encounter in care of patient: 70 mins. This time was spent on one or more of the following: performing physical exam; counseling and coordination of care; obtaining or reviewing history; documenting in the medical record; reviewing/ordering tests, medications or procedures; communicating with other healthcare professionals and discussing with patient's family/caregivers.    Chief Complaint: Shortness of breath    History of Present Illness:  Markie Barrios is a 68 y.o. male with a PMH of HTN, HLD, BPH who presents with complaints of congestion, dry cough, fatigue and rhinorrhea for the past 4 days. Patient was originally seen by his outpatient PCP this morning who recommended patient present to the ED for further evaluation at which time patient refused. Patient returned home and developed worsening shortness of breath which prompted the patient to call for an ambulance.     Upon evaluation in the ED, patient met sepsis criteria due to tachycardia and tachypnea with RSV as suspected source. Patient required supplemental oxygen on arrival.  Patient will be admitted for acute respiratory failure and sepsis workup in the setting of RSV.    Review of Systems:  Review of Systems   Constitutional:  Positive for fatigue. Negative for chills and fever.   HENT:  Positive for congestion and rhinorrhea.    Respiratory:  Positive for cough and shortness of breath.    Cardiovascular:  Negative for chest pain.   Gastrointestinal:  Negative for nausea and vomiting.   All other systems reviewed and are negative.      Past Medical and Surgical History:   Past Medical History:   Diagnosis Date    BPH (benign prostatic hyperplasia)     Dyslipidemia     Hypertension     Impaired fasting glucose     Osteoarthritis of both knees        Past Surgical History:   Procedure Laterality Date    COLONOSCOPY  11/23/2020    tubular adenoma;  Dr Ross       Meds/Allergies:  Prior to Admission medications    Medication Sig Start Date End Date Taking? Authorizing Provider   azithromycin (ZITHROMAX) 250 mg tablet Take 2 tabs on Day 1 than 1 tab Days 2-5 1/18/24 1/22/24  TEE Lima   benzonatate (TESSALON PERLES) 100 mg capsule Take 1 capsule (100 mg total) by mouth 3 (three) times a day as needed for cough 1/18/24   TEE Lima   enalapril (VASOTEC) 20 mg tablet TAKE 1 TABLET TWICE A DAY 10/18/23   Lonnie Huitron MD   hydrochlorothiazide (HYDRODIURIL) 25 mg tablet TAKE 1 TABLET DAILY 12/1/23   Lonnie Huitron MD   sildenafil (VIAGRA) 50 MG tablet Take 1 tablet (50 mg total) by mouth daily as needed for erectile dysfunction 1/4/24   Lonnie Huitron MD   tamsulosin (FLOMAX) 0.4 mg Take 2 capsules (0.8 mg total) by mouth daily with dinner 1/4/24   Lonnie Huitron MD     I have reviewed home medications with patient personally.    Allergies: No Known Allergies    Social History:  Marital Status: Unknown   Occupation: Unknown  Patient Pre-hospital Living Situation: Home  Patient Pre-hospital Level of Mobility: walks  Patient Pre-hospital Diet Restrictions: None  Substance Use History:   Social History     Substance and Sexual Activity   Alcohol Use Not Currently    Comment: occasional     Social History     Tobacco Use   Smoking Status Never   Smokeless Tobacco Never     Social History     Substance and Sexual Activity   Drug Use Never       Family History:  Family History   Problem Relation Age of Onset    Hypertension Mother     Kidney disease Mother     No Known Problems Sister     No Known Problems Brother     No Known Problems Brother     No Known Problems Son     No Known Problems Son     No Known Problems Daughter        Physical Exam:     Vitals:   Blood Pressure: 162/94 (01/18/24 2212)  Pulse: 105 (01/18/24 2212)  Temperature: 98.8 °F (37.1 °C) (01/18/24 2212)  Temp Source: Oral (01/18/24  "2212)  Respirations: 20 (01/18/24 2212)  Height: 6' 2\" (188 cm) (01/18/24 2212)  Weight - Scale: 130 kg (286 lb) (01/18/24 2212)  SpO2: (!) 86 % (01/18/24 2212)    Physical Exam  Vitals and nursing note reviewed.   Constitutional:       General: He is not in acute distress.     Appearance: He is obese. He is not ill-appearing or diaphoretic.   HENT:      Head: Normocephalic.      Nose: Nose normal.      Mouth/Throat:      Pharynx: Oropharynx is clear.   Eyes:      General: No scleral icterus.     Conjunctiva/sclera: Conjunctivae normal.   Cardiovascular:      Rate and Rhythm: Normal rate and regular rhythm.      Pulses: Normal pulses.      Heart sounds: Normal heart sounds.   Pulmonary:      Effort: Pulmonary effort is normal. No accessory muscle usage or respiratory distress.      Breath sounds: Wheezing present. No decreased breath sounds, rhonchi or rales.   Abdominal:      General: Bowel sounds are normal. There is no distension.      Palpations: Abdomen is soft.      Tenderness: There is no abdominal tenderness.   Musculoskeletal:         General: No swelling or deformity. Normal range of motion.      Cervical back: Normal range of motion.   Skin:     General: Skin is warm and dry.   Neurological:      Mental Status: He is alert and oriented to person, place, and time.   Psychiatric:         Speech: Speech normal.          Additional Data:     Lab Results:  Results from last 7 days   Lab Units 01/18/24  1712   WBC Thousand/uL 8.37   HEMOGLOBIN g/dL 13.6   HEMATOCRIT % 39.1   PLATELETS Thousands/uL 187   NEUTROS PCT % 76*   LYMPHS PCT % 12*   MONOS PCT % 10   EOS PCT % 1     Results from last 7 days   Lab Units 01/18/24  1712   SODIUM mmol/L 135   POTASSIUM mmol/L 3.4*   CHLORIDE mmol/L 100   CO2 mmol/L 25   BUN mg/dL 23   CREATININE mg/dL 0.94   ANION GAP mmol/L 10   CALCIUM mg/dL 9.5   ALBUMIN g/dL 4.1   TOTAL BILIRUBIN mg/dL 0.66   ALK PHOS U/L 44   ALT U/L 27   AST U/L 18   GLUCOSE RANDOM mg/dL 114 "     Results from last 7 days   Lab Units 01/18/24  1712   INR  1.06             Results from last 7 days   Lab Units 01/18/24  1712   LACTIC ACID mmol/L 0.8   PROCALCITONIN ng/ml 0.12       Lines/Drains:  Invasive Devices       Peripheral Intravenous Line  Duration             Peripheral IV 01/18/24 Distal;Left;Upper;Ventral (anterior) Arm <1 day                        Imaging: Personally reviewed the following imaging: chest xray  XR chest 1 view portable   ED Interpretation by Julie Lynn Gutzweiler, PA-C (01/18 1915)          EKG and Other Studies Reviewed on Admission:   EKG: NSR. HR 97.    ** Please Note: This note has been constructed using a voice recognition system. **

## 2024-01-19 NOTE — ASSESSMENT & PLAN NOTE
Blood pressure is reviewed and acceptable.  Last recorded pressure: 162/94  Continue ACE-I and HCTZ.  Monitor blood pressure.

## 2024-01-20 PROBLEM — J96.01 ACUTE HYPOXIC RESPIRATORY FAILURE (HCC): Status: ACTIVE | Noted: 2024-01-20

## 2024-01-20 PROBLEM — E86.1 HYPOVOLEMIA: Status: ACTIVE | Noted: 2024-01-20

## 2024-01-20 PROBLEM — E87.1 HYPONATREMIA: Status: ACTIVE | Noted: 2024-01-20

## 2024-01-20 LAB
ANION GAP SERPL CALCULATED.3IONS-SCNC: 9 MMOL/L
BASOPHILS # BLD MANUAL: 0 THOUSAND/UL (ref 0–0.1)
BASOPHILS NFR MAR MANUAL: 0 % (ref 0–1)
BUN SERPL-MCNC: 22 MG/DL (ref 5–25)
CALCIUM SERPL-MCNC: 9.2 MG/DL (ref 8.4–10.2)
CHLORIDE SERPL-SCNC: 100 MMOL/L (ref 96–108)
CO2 SERPL-SCNC: 24 MMOL/L (ref 21–32)
CREAT SERPL-MCNC: 0.87 MG/DL (ref 0.6–1.3)
D DIMER PPP FEU-MCNC: 0.61 UG/ML FEU
EOSINOPHIL # BLD MANUAL: 0 THOUSAND/UL (ref 0–0.4)
EOSINOPHIL NFR BLD MANUAL: 0 % (ref 0–6)
ERYTHROCYTE [DISTWIDTH] IN BLOOD BY AUTOMATED COUNT: 12.5 % (ref 11.6–15.1)
GFR SERPL CREATININE-BSD FRML MDRD: 88 ML/MIN/1.73SQ M
GLUCOSE SERPL-MCNC: 151 MG/DL (ref 65–140)
HCT VFR BLD AUTO: 37.7 % (ref 36.5–49.3)
HGB BLD-MCNC: 13.3 G/DL (ref 12–17)
LG PLATELETS BLD QL SMEAR: PRESENT
LYMPHOCYTES # BLD AUTO: 1.65 THOUSAND/UL (ref 0.6–4.47)
LYMPHOCYTES # BLD AUTO: 11 % (ref 14–44)
MCH RBC QN AUTO: 31.7 PG (ref 26.8–34.3)
MCHC RBC AUTO-ENTMCNC: 35.3 G/DL (ref 31.4–37.4)
MCV RBC AUTO: 90 FL (ref 82–98)
MONOCYTES # BLD AUTO: 0.3 THOUSAND/UL (ref 0–1.22)
MONOCYTES NFR BLD: 2 % (ref 4–12)
NEUTROPHILS # BLD MANUAL: 13.04 THOUSAND/UL (ref 1.85–7.62)
NEUTS SEG NFR BLD AUTO: 87 % (ref 43–75)
PLATELET # BLD AUTO: 248 THOUSANDS/UL (ref 149–390)
PLATELET BLD QL SMEAR: ADEQUATE
PMV BLD AUTO: 9.6 FL (ref 8.9–12.7)
POTASSIUM SERPL-SCNC: 3.8 MMOL/L (ref 3.5–5.3)
PROCALCITONIN SERPL-MCNC: 0.1 NG/ML
RBC # BLD AUTO: 4.2 MILLION/UL (ref 3.88–5.62)
RBC MORPH BLD: NORMAL
SODIUM SERPL-SCNC: 133 MMOL/L (ref 135–147)
WBC # BLD AUTO: 14.99 THOUSAND/UL (ref 4.31–10.16)

## 2024-01-20 PROCEDURE — 85027 COMPLETE CBC AUTOMATED: CPT | Performed by: STUDENT IN AN ORGANIZED HEALTH CARE EDUCATION/TRAINING PROGRAM

## 2024-01-20 PROCEDURE — 94640 AIRWAY INHALATION TREATMENT: CPT

## 2024-01-20 PROCEDURE — 85379 FIBRIN DEGRADATION QUANT: CPT | Performed by: INTERNAL MEDICINE

## 2024-01-20 PROCEDURE — 99232 SBSQ HOSP IP/OBS MODERATE 35: CPT | Performed by: INTERNAL MEDICINE

## 2024-01-20 PROCEDURE — 94760 N-INVAS EAR/PLS OXIMETRY 1: CPT

## 2024-01-20 PROCEDURE — 85007 BL SMEAR W/DIFF WBC COUNT: CPT | Performed by: STUDENT IN AN ORGANIZED HEALTH CARE EDUCATION/TRAINING PROGRAM

## 2024-01-20 PROCEDURE — 84145 PROCALCITONIN (PCT): CPT | Performed by: NURSE PRACTITIONER

## 2024-01-20 PROCEDURE — 80048 BASIC METABOLIC PNL TOTAL CA: CPT | Performed by: STUDENT IN AN ORGANIZED HEALTH CARE EDUCATION/TRAINING PROGRAM

## 2024-01-20 RX ORDER — IPRATROPIUM BROMIDE AND ALBUTEROL SULFATE 2.5; .5 MG/3ML; MG/3ML
3 SOLUTION RESPIRATORY (INHALATION)
Status: DISCONTINUED | OUTPATIENT
Start: 2024-01-20 | End: 2024-01-20

## 2024-01-20 RX ADMIN — LISINOPRIL 40 MG: 20 TABLET ORAL at 08:18

## 2024-01-20 RX ADMIN — METHYLPREDNISOLONE SODIUM SUCCINATE 40 MG: 40 INJECTION, POWDER, FOR SOLUTION INTRAMUSCULAR; INTRAVENOUS at 13:39

## 2024-01-20 RX ADMIN — BENZONATATE 100 MG: 100 CAPSULE ORAL at 10:35

## 2024-01-20 RX ADMIN — IPRATROPIUM BROMIDE AND ALBUTEROL SULFATE 3 ML: .5; 3 SOLUTION RESPIRATORY (INHALATION) at 11:19

## 2024-01-20 RX ADMIN — HYDROCHLOROTHIAZIDE 25 MG: 25 TABLET ORAL at 08:18

## 2024-01-20 RX ADMIN — GUAIFENESIN 600 MG: 600 TABLET ORAL at 21:47

## 2024-01-20 RX ADMIN — TAMSULOSIN HYDROCHLORIDE 0.8 MG: 0.4 CAPSULE ORAL at 16:53

## 2024-01-20 RX ADMIN — METHYLPREDNISOLONE SODIUM SUCCINATE 40 MG: 40 INJECTION, POWDER, FOR SOLUTION INTRAMUSCULAR; INTRAVENOUS at 21:47

## 2024-01-20 RX ADMIN — GUAIFENESIN 600 MG: 600 TABLET ORAL at 08:18

## 2024-01-20 RX ADMIN — METHYLPREDNISOLONE SODIUM SUCCINATE 40 MG: 40 INJECTION, POWDER, FOR SOLUTION INTRAMUSCULAR; INTRAVENOUS at 05:07

## 2024-01-20 NOTE — PROGRESS NOTES
[facility]  Progress Note  Name: Markie Bariros I  MRN: [id number]  Unit/Bed#: W -01 I Date of Admission: 1/18/2024   Date of Service: 1/20/2024 I Hospital Day: 2    Assessment/Plan   * Acute hypoxic respiratory failure (HCC)  Assessment & Plan  Has RSV - on steroids and nebulizers  Oxygen requirements not improving still needing 6 L   Will check dimer   Procal is negative   If no improvement will need repeat CXR versus CTPE depending on dimer       Hyponatremia  Assessment & Plan  Na is 133  Has gained 2 kg   Will need to monitor I/O and daily weights no urine output noted thus far   Will discuss with nursing - jacinta betancourt and asked them to start monitoring closely.    RSV bronchitis  Assessment & Plan  Patient reports congestion, dry cough, fatigue and rhinorrhea for the past 4 days. Patient was originally seen by his outpatient PCP this morning who recommended patient present to the ED for further evaluation at which time patient refused. Patient returned home and developed worsening shortness of breath which prompted the patient to call for an ambulance.   RSV+ on 01/18/2024  Continue IV Solu-Medrol, notable wheezes noted on exam  Continue guaifenesin, Tessalon Perles as needed  Currently requiring 6 L, wean as able  Still hypoxic - see hypoxia problem      Sepsis (HCC)  Assessment & Plan  SIRS criteria: Tachycardia, tachypnea  Suspected source: RSV Bronchitis.   Suspect likely due to RSV, monitor off antibiotics  Follow-up with blood cultures  Procal neg x 3    BPH (benign prostatic hyperplasia)  Assessment & Plan  Continue Flomax      Benign essential hypertension  Assessment & Plan  Continue to monitor blood pressure at this time  Continue lisinopril 40 mg daily, HCTZ 25 mg daily  /83               VTE Pharmacologic Prophylaxis: VTE Score: 7 Moderate Risk (Score 3-4) - Pharmacological DVT Prophylaxis Ordered: heparin.    Mobility:   No PT eval    Patient Centered  "Rounds: I performed bedside rounds with nursing staff today.   Discussions with Specialists or Other Care Team Provider: Discussed with nursing.     Education and Discussions with Family / Patient:  called Anya Rico voicemail    Total Time Spent on Date of Encounter in care of patient: 30 mins. This time was spent on one or more of the following: performing physical exam; counseling and coordination of care; obtaining or reviewing history; documenting in the medical record; reviewing/ordering tests, medications or procedures; communicating with other healthcare professionals and discussing with patient's family/caregivers.    Current Length of Stay: 2 day(s)  Current Patient Status: Inpatient   Certification Statement: The patient will continue to require additional inpatient hospital stay due to ongoing hypoxia.   Discharge Plan: Anticipate discharge in 24-48 hrs to home.    Code Status: Level 1 - Full Code    Subjective:   Patient seen and examined   Feeling \"the same\"    Objective:     Vitals:   Temp (24hrs), Av.6 °F (36.4 °C), Min:97.4 °F (36.3 °C), Max:97.7 °F (36.5 °C)    Temp:  [97.4 °F (36.3 °C)-97.7 °F (36.5 °C)] 97.7 °F (36.5 °C)  HR:  [] 101  Resp:  [12-16] 16  BP: (130-154)/(83-90) 130/83  SpO2:  [88 %-94 %] 90 %  Body mass index is 36.72 kg/m².     Input and Output Summary (last 24 hours):     Intake/Output Summary (Last 24 hours) at 2024 1337  Last data filed at 2024 2043  Gross per 24 hour   Intake 1404 ml   Output --   Net 1404 ml       Physical Exam:   Physical Exam  Constitutional:       General: He is not in acute distress.     Appearance: He is ill-appearing. He is not toxic-appearing.   HENT:      Head: Normocephalic.   Eyes:      Pupils: Pupils are equal, round, and reactive to light.   Cardiovascular:      Rate and Rhythm: Normal rate.      Heart sounds: No murmur heard.     No friction rub. No gallop.   Pulmonary:      Effort: Respiratory distress (mild) present.      " Breath sounds: No stridor. Wheezing present. No rhonchi.   Chest:      Chest wall: No tenderness.   Abdominal:      General: Abdomen is flat. There is no distension.      Palpations: There is no mass.      Tenderness: There is no abdominal tenderness. There is no right CVA tenderness, left CVA tenderness, guarding or rebound.      Hernia: No hernia is present.   Musculoskeletal:         General: No swelling, tenderness, deformity or signs of injury.      Right lower leg: No edema.      Left lower leg: No edema.   Skin:     Capillary Refill: Capillary refill takes less than 2 seconds.      Coloration: Skin is not jaundiced or pale.      Findings: No bruising, erythema, lesion or rash.   Neurological:      General: No focal deficit present.      Cranial Nerves: No cranial nerve deficit.      Sensory: No sensory deficit.      Motor: No weakness.      Coordination: Coordination normal.      Gait: Gait normal.      Deep Tendon Reflexes: Reflexes normal.   Psychiatric:         Mood and Affect: Mood normal.          Additional Data:     Labs:  Results from last 7 days   Lab Units 01/20/24  0530 01/18/24  1712   WBC Thousand/uL 14.99* 8.37   HEMOGLOBIN g/dL 13.3 13.6   HEMATOCRIT % 37.7 39.1   PLATELETS Thousands/uL 248 187   NEUTROS PCT %  --  76*   LYMPHS PCT %  --  12*   LYMPHO PCT % 11*  --    MONOS PCT %  --  10   MONO PCT % 2*  --    EOS PCT % 0 1     Results from last 7 days   Lab Units 01/20/24  0530 01/18/24  1712   SODIUM mmol/L 133* 135   POTASSIUM mmol/L 3.8 3.4*   CHLORIDE mmol/L 100 100   CO2 mmol/L 24 25   BUN mg/dL 22 23   CREATININE mg/dL 0.87 0.94   ANION GAP mmol/L 9 10   CALCIUM mg/dL 9.2 9.5   ALBUMIN g/dL  --  4.1   TOTAL BILIRUBIN mg/dL  --  0.66   ALK PHOS U/L  --  44   ALT U/L  --  27   AST U/L  --  18   GLUCOSE RANDOM mg/dL 151* 114     Results from last 7 days   Lab Units 01/18/24  1712   INR  1.06             Results from last 7 days   Lab Units 01/20/24  0530 01/18/24  1712   LACTIC ACID mmol/L   --  0.8   PROCALCITONIN ng/ml 0.10 0.12       Lines/Drains:  Invasive Devices       Peripheral Intravenous Line  Duration             Peripheral IV 01/18/24 Distal;Left;Upper;Ventral (anterior) Arm 1 day                          Imaging: No pertinent imaging reviewed.    Recent Cultures (last 7 days):   Results from last 7 days   Lab Units 01/18/24  1741 01/18/24  1712   BLOOD CULTURE  No Growth at 24 hrs. No Growth at 24 hrs.       Last 24 Hours Medication List:   Current Facility-Administered Medications   Medication Dose Route Frequency Provider Last Rate    acetaminophen  650 mg Oral Q6H PRN Kena TEE Davidson      benzonatate  100 mg Oral TID PRN Kena TEE Davidson      enoxaparin  30 mg Subcutaneous Daily TEE Rome      guaiFENesin  600 mg Oral Q12H [facility] Andrew Riddle MD      hydrochlorothiazide  25 mg Oral Daily TEE Rome      ipratropium-albuterol  3 mL Nebulization Q6H PRN Andrew Riddle MD      lisinopril  40 mg Oral Daily TEE Rome      methylPREDNISolone sodium succinate  40 mg Intravenous Q8H NOEMI TEE Rome      tamsulosin  0.8 mg Oral Daily With Dinner TEE Rome          Today, Patient Was Seen By: Iza Hutchison MD    **Please Note: This note may have been constructed using a voice recognition system.**

## 2024-01-20 NOTE — ASSESSMENT & PLAN NOTE
SIRS criteria: Tachycardia, tachypnea  Suspected source: RSV Bronchitis.   Suspect likely due to RSV, monitor off antibiotics  Follow-up with blood cultures  Procal neg x 3

## 2024-01-20 NOTE — ASSESSMENT & PLAN NOTE
Na is 133  Has gained 2 kg   Will need to monitor I/O and daily weights no urine output noted thus far   Will discuss with nursing - tiger maeveed and asked them to start monitoring closely.

## 2024-01-20 NOTE — ASSESSMENT & PLAN NOTE
Has RSV - on steroids and nebulizers  Oxygen requirements not improving still needing 6 L   Will check dimer   Procal is negative   If no improvement will need repeat CXR versus CTPE depending on dimer

## 2024-01-20 NOTE — ASSESSMENT & PLAN NOTE
Patient reports congestion, dry cough, fatigue and rhinorrhea for the past 4 days. Patient was originally seen by his outpatient PCP this morning who recommended patient present to the ED for further evaluation at which time patient refused. Patient returned home and developed worsening shortness of breath which prompted the patient to call for an ambulance.   RSV+ on 01/18/2024  Continue IV Solu-Medrol, notable wheezes noted on exam  Continue guaifenesin, Tessalon Perles as needed  Currently requiring 6 L, wean as able  Still hypoxic - see hypoxia problem

## 2024-01-20 NOTE — PLAN OF CARE
Problem: PAIN - ADULT  Goal: Verbalizes/displays adequate comfort level or baseline comfort level  Description: Interventions:  - Encourage patient to monitor pain and request assistance  - Assess pain using appropriate pain scale  - Administer analgesics based on type and severity of pain and evaluate response  - Implement non-pharmacological measures as appropriate and evaluate response  - Consider cultural and social influences on pain and pain management  - Notify physician/advanced practitioner if interventions unsuccessful or patient reports new pain  Outcome: Progressing     Problem: INFECTION - ADULT  Goal: Absence or prevention of progression during hospitalization  Description: INTERVENTIONS:  - Assess and monitor for signs and symptoms of infection  - Monitor lab/diagnostic results  - Monitor all insertion sites, i.e. indwelling lines, tubes, and drains  - Monitor endotracheal if appropriate and nasal secretions for changes in amount and color  - [place] appropriate cooling/warming therapies per order  - Administer medications as ordered  - Instruct and encourage patient and family to use good hand hygiene technique  - Identify and instruct in appropriate isolation precautions for identified infection/condition  Outcome: Progressing  Goal: Absence of fever/infection during neutropenic period  Description: INTERVENTIONS:  - Monitor WBC    Outcome: Progressing     Problem: SAFETY ADULT  Goal: Patient will remain free of falls  Description: INTERVENTIONS:  - Educate patient/family on patient safety including physical limitations  - Instruct patient to call for assistance with activity   - Consult OT/PT to assist with strengthening/mobility   - Keep Call bell within reach  - Keep bed low and locked with side rails adjusted as appropriate  - Keep care items and personal belongings within reach  - Initiate and maintain comfort rounds  - Make Fall Risk Sign visible to staff  - Offer Toileting every  Hours,  in advance of need  - Initiate/Maintain alarm  - Obtain necessary fall risk management equipment:    - Apply yellow socks and bracelet for high fall risk patients  - Consider moving patient to room near nurses station  Outcome: Progressing  Goal: Maintain or return to baseline ADL function  Description: INTERVENTIONS:  -  Assess patient's ability to carry out ADLs; assess patient's baseline for ADL function and identify physical deficits which impact ability to perform ADLs (bathing, care of mouth/teeth, toileting, grooming, dressing, etc.)  - Assess/evaluate cause of self-care deficits   - Assess range of motion  - Assess patient's mobility; develop plan if impaired  - Assess patient's need for assistive devices and provide as appropriate  - Encourage maximum independence but intervene and supervise when necessary  - Involve family in performance of ADLs  - Assess for home care needs following discharge   - Consider OT consult to assist with ADL evaluation and planning for discharge  - Provide patient education as appropriate  Outcome: Progressing  Goal: Maintains/Returns to pre admission functional level  Description: INTERVENTIONS:  - Perform AM-PAC 6 Click Basic Mobility/ Daily Activity assessment daily.  - Set and communicate daily mobility goal to care team and patient/family/caregiver.   - Collaborate with rehabilitation services on mobility goals if consulted  - Perform Range of Motion  times a day.  - Reposition patient every  hours.  - Dangle patient  times a day  - Stand patient  times a day  - Ambulate patient times a day  - Out of bed to chair  times a day   - Out of bed for meals  times a day  - Out of bed for toileting  - Record patient progress and toleration of activity level   Outcome: Progressing     Problem: DISCHARGE PLANNING  Goal: Discharge to home or other facility with appropriate resources  Description: INTERVENTIONS:  - Identify barriers to discharge w/patient and caregiver  - Arrange for  needed discharge resources and transportation as appropriate  - Identify discharge learning needs (meds, wound care, etc.)  - Arrange for interpretive services to assist at discharge as needed  - Refer to Case Management Department for coordinating discharge planning if the patient needs post-hospital services based on physician/advanced practitioner order or complex needs related to functional status, cognitive ability, or social support system  Outcome: Progressing     Problem: Knowledge Deficit  Goal: Patient/family/caregiver demonstrates understanding of disease process, treatment plan, medications, and discharge instructions  Description: Complete learning assessment and assess knowledge base.  Interventions:  - Provide teaching at level of understanding  - Provide teaching via preferred learning methods  Outcome: Progressing

## 2024-01-20 NOTE — ASSESSMENT & PLAN NOTE
Continue to monitor blood pressure at this time  Continue lisinopril 40 mg daily, HCTZ 25 mg daily  /83

## 2024-01-20 NOTE — PLAN OF CARE
Problem: PAIN - ADULT  Goal: Verbalizes/displays adequate comfort level or baseline comfort level  Description: Interventions:  - Encourage patient to monitor pain and request assistance  - Assess pain using appropriate pain scale  - Administer analgesics based on type and severity of pain and evaluate response  - Implement non-pharmacological measures as appropriate and evaluate response  - Consider cultural and social influences on pain and pain management  - Notify physician/advanced practitioner if interventions unsuccessful or patient reports new pain  Outcome: Progressing     Problem: INFECTION - ADULT  Goal: Absence or prevention of progression during hospitalization  Description: INTERVENTIONS:  - Assess and monitor for signs and symptoms of infection  - Monitor lab/diagnostic results  - Monitor all insertion sites, i.e. indwelling lines, tubes, and drains  - Monitor endotracheal if appropriate and nasal secretions for changes in amount and color  - [place] appropriate cooling/warming therapies per order  - Administer medications as ordered  - Instruct and encourage patient and family to use good hand hygiene technique  - Identify and instruct in appropriate isolation precautions for identified infection/condition  Outcome: Progressing  Goal: Absence of fever/infection during neutropenic period  Description: INTERVENTIONS:  - Monitor WBC    Outcome: Progressing     Problem: SAFETY ADULT  Goal: Patient will remain free of falls  Description: INTERVENTIONS:  - Educate patient/family on patient safety including physical limitations  - Instruct patient to call for assistance with activity   - Consult OT/PT to assist with strengthening/mobility   - Keep Call bell within reach  - Keep bed low and locked with side rails adjusted as appropriate  - Keep care items and personal belongings within reach  - Initiate and maintain comfort rounds  - Make Fall Risk Sign visible to staff  - Offer Toileting every  Hours,  in advance of need  - Initiate/Maintain alarm  - Obtain necessary fall risk management equipment:    - Apply yellow socks and bracelet for high fall risk patients  - Consider moving patient to room near nurses station  Outcome: Progressing  Goal: Maintain or return to baseline ADL function  Description: INTERVENTIONS:  -  Assess patient's ability to carry out ADLs; assess patient's baseline for ADL function and identify physical deficits which impact ability to perform ADLs (bathing, care of mouth/teeth, toileting, grooming, dressing, etc.)  - Assess/evaluate cause of self-care deficits   - Assess range of motion  - Assess patient's mobility; develop plan if impaired  - Assess patient's need for assistive devices and provide as appropriate  - Encourage maximum independence but intervene and supervise when necessary  - Involve family in performance of ADLs  - Assess for home care needs following discharge   - Consider OT consult to assist with ADL evaluation and planning for discharge  - Provide patient education as appropriate  Outcome: Progressing  Goal: Maintains/Returns to pre admission functional level  Description: INTERVENTIONS:  - Perform AM-PAC 6 Click Basic Mobility/ Daily Activity assessment daily.  - Set and communicate daily mobility goal to care team and patient/family/caregiver.   - Collaborate with rehabilitation services on mobility goals if consulted  - Perform Range of Motion  times a day.  - Reposition patient every  hours.  - Dangle patient  times a day  - Stand patient  times a day  - Ambulate patient times a day  - Out of bed to chair  times a day   - Out of bed for meals  times a day  - Out of bed for toileting  - Record patient progress and toleration of activity level   Outcome: Progressing     Problem: DISCHARGE PLANNING  Goal: Discharge to home or other facility with appropriate resources  Description: INTERVENTIONS:  - Identify barriers to discharge w/patient and caregiver  - Arrange for  needed discharge resources and transportation as appropriate  - Identify discharge learning needs (meds, wound care, etc.)  - Arrange for interpretive services to assist at discharge as needed  - Refer to Case Management Department for coordinating discharge planning if the patient needs post-hospital services based on physician/advanced practitioner order or complex needs related to functional status, cognitive ability, or social support system  Outcome: Progressing     Problem: Knowledge Deficit  Goal: Patient/family/caregiver demonstrates understanding of disease process, treatment plan, medications, and discharge instructions  Description: Complete learning assessment and assess knowledge base.  Interventions:  - Provide teaching at level of understanding  - Provide teaching via preferred learning methods  Outcome: Progressing

## 2024-01-21 ENCOUNTER — APPOINTMENT (INPATIENT)
Dept: CT IMAGING | Facility: HOSPITAL | Age: 69
DRG: 871 | End: 2024-01-21
Payer: MEDICARE

## 2024-01-21 LAB
ALBUMIN SERPL BCP-MCNC: 3.7 G/DL (ref 3.5–5)
ALP SERPL-CCNC: 39 U/L (ref 34–104)
ALT SERPL W P-5'-P-CCNC: 35 U/L (ref 7–52)
ANION GAP SERPL CALCULATED.3IONS-SCNC: 9 MMOL/L
ANION GAP SERPL CALCULATED.3IONS-SCNC: 9 MMOL/L
AST SERPL W P-5'-P-CCNC: 21 U/L (ref 13–39)
BASOPHILS # BLD MANUAL: 0 THOUSAND/UL (ref 0–0.1)
BASOPHILS NFR MAR MANUAL: 0 % (ref 0–1)
BILIRUB SERPL-MCNC: 0.5 MG/DL (ref 0.2–1)
BUN SERPL-MCNC: 22 MG/DL (ref 5–25)
BUN SERPL-MCNC: 23 MG/DL (ref 5–25)
CALCIUM SERPL-MCNC: 9.2 MG/DL (ref 8.4–10.2)
CALCIUM SERPL-MCNC: 9.3 MG/DL (ref 8.4–10.2)
CHLORIDE SERPL-SCNC: 100 MMOL/L (ref 96–108)
CHLORIDE SERPL-SCNC: 99 MMOL/L (ref 96–108)
CO2 SERPL-SCNC: 24 MMOL/L (ref 21–32)
CO2 SERPL-SCNC: 25 MMOL/L (ref 21–32)
CREAT SERPL-MCNC: 0.84 MG/DL (ref 0.6–1.3)
CREAT SERPL-MCNC: 0.85 MG/DL (ref 0.6–1.3)
EOSINOPHIL # BLD MANUAL: 0 THOUSAND/UL (ref 0–0.4)
EOSINOPHIL NFR BLD MANUAL: 0 % (ref 0–6)
ERYTHROCYTE [DISTWIDTH] IN BLOOD BY AUTOMATED COUNT: 12.5 % (ref 11.6–15.1)
GFR SERPL CREATININE-BSD FRML MDRD: 88 ML/MIN/1.73SQ M
GFR SERPL CREATININE-BSD FRML MDRD: 89 ML/MIN/1.73SQ M
GLUCOSE SERPL-MCNC: 147 MG/DL (ref 65–140)
GLUCOSE SERPL-MCNC: 149 MG/DL (ref 65–140)
HCT VFR BLD AUTO: 38.1 % (ref 36.5–49.3)
HGB BLD-MCNC: 13.7 G/DL (ref 12–17)
LYMPHOCYTES # BLD AUTO: 1.75 THOUSAND/UL (ref 0.6–4.47)
LYMPHOCYTES # BLD AUTO: 8 % (ref 14–44)
MCH RBC QN AUTO: 31.9 PG (ref 26.8–34.3)
MCHC RBC AUTO-ENTMCNC: 36 G/DL (ref 31.4–37.4)
MCV RBC AUTO: 89 FL (ref 82–98)
METAMYELOCYTES NFR BLD MANUAL: 2 % (ref 0–1)
MONOCYTES # BLD AUTO: 0.35 THOUSAND/UL (ref 0–1.22)
MONOCYTES NFR BLD: 2 % (ref 4–12)
NEUTROPHILS # BLD MANUAL: 15.03 THOUSAND/UL (ref 1.85–7.62)
NEUTS BAND NFR BLD MANUAL: 2 % (ref 0–8)
NEUTS SEG NFR BLD AUTO: 84 % (ref 43–75)
PLATELET # BLD AUTO: 261 THOUSANDS/UL (ref 149–390)
PLATELET BLD QL SMEAR: ADEQUATE
PMV BLD AUTO: 9.2 FL (ref 8.9–12.7)
POTASSIUM SERPL-SCNC: 3.7 MMOL/L (ref 3.5–5.3)
POTASSIUM SERPL-SCNC: 3.7 MMOL/L (ref 3.5–5.3)
PROT SERPL-MCNC: 6.6 G/DL (ref 6.4–8.4)
RBC # BLD AUTO: 4.29 MILLION/UL (ref 3.88–5.62)
SODIUM SERPL-SCNC: 133 MMOL/L (ref 135–147)
SODIUM SERPL-SCNC: 133 MMOL/L (ref 135–147)
VARIANT LYMPHS # BLD AUTO: 2 %
WBC # BLD AUTO: 17.48 THOUSAND/UL (ref 4.31–10.16)

## 2024-01-21 PROCEDURE — 94760 N-INVAS EAR/PLS OXIMETRY 1: CPT

## 2024-01-21 PROCEDURE — 99232 SBSQ HOSP IP/OBS MODERATE 35: CPT | Performed by: INTERNAL MEDICINE

## 2024-01-21 PROCEDURE — 80053 COMPREHEN METABOLIC PANEL: CPT | Performed by: INTERNAL MEDICINE

## 2024-01-21 PROCEDURE — 94640 AIRWAY INHALATION TREATMENT: CPT

## 2024-01-21 PROCEDURE — 85027 COMPLETE CBC AUTOMATED: CPT | Performed by: NURSE PRACTITIONER

## 2024-01-21 PROCEDURE — 85007 BL SMEAR W/DIFF WBC COUNT: CPT | Performed by: NURSE PRACTITIONER

## 2024-01-21 PROCEDURE — G1004 CDSM NDSC: HCPCS

## 2024-01-21 PROCEDURE — 71275 CT ANGIOGRAPHY CHEST: CPT

## 2024-01-21 PROCEDURE — 80048 BASIC METABOLIC PNL TOTAL CA: CPT | Performed by: NURSE PRACTITIONER

## 2024-01-21 RX ORDER — FUROSEMIDE 10 MG/ML
20 INJECTION INTRAMUSCULAR; INTRAVENOUS ONCE
Status: COMPLETED | OUTPATIENT
Start: 2024-01-21 | End: 2024-01-21

## 2024-01-21 RX ADMIN — TAMSULOSIN HYDROCHLORIDE 0.8 MG: 0.4 CAPSULE ORAL at 15:43

## 2024-01-21 RX ADMIN — GUAIFENESIN 600 MG: 600 TABLET ORAL at 21:38

## 2024-01-21 RX ADMIN — LISINOPRIL 40 MG: 20 TABLET ORAL at 08:30

## 2024-01-21 RX ADMIN — ENOXAPARIN SODIUM 30 MG: 30 INJECTION SUBCUTANEOUS at 08:30

## 2024-01-21 RX ADMIN — GUAIFENESIN 600 MG: 600 TABLET ORAL at 08:30

## 2024-01-21 RX ADMIN — BENZONATATE 100 MG: 100 CAPSULE ORAL at 08:30

## 2024-01-21 RX ADMIN — METHYLPREDNISOLONE SODIUM SUCCINATE 40 MG: 40 INJECTION, POWDER, FOR SOLUTION INTRAMUSCULAR; INTRAVENOUS at 05:51

## 2024-01-21 RX ADMIN — METHYLPREDNISOLONE SODIUM SUCCINATE 40 MG: 40 INJECTION, POWDER, FOR SOLUTION INTRAMUSCULAR; INTRAVENOUS at 14:58

## 2024-01-21 RX ADMIN — IOHEXOL 85 ML: 350 INJECTION, SOLUTION INTRAVENOUS at 00:10

## 2024-01-21 RX ADMIN — METHYLPREDNISOLONE SODIUM SUCCINATE 40 MG: 40 INJECTION, POWDER, FOR SOLUTION INTRAMUSCULAR; INTRAVENOUS at 21:38

## 2024-01-21 RX ADMIN — FUROSEMIDE 20 MG: 10 INJECTION, SOLUTION INTRAMUSCULAR; INTRAVENOUS at 18:21

## 2024-01-21 RX ADMIN — HYDROCHLOROTHIAZIDE 25 MG: 25 TABLET ORAL at 08:30

## 2024-01-21 RX ADMIN — IPRATROPIUM BROMIDE AND ALBUTEROL SULFATE 3 ML: .5; 3 SOLUTION RESPIRATORY (INHALATION) at 09:20

## 2024-01-21 NOTE — ASSESSMENT & PLAN NOTE
Continue to monitor blood pressure at this time  Continue lisinopril 40 mg daily, HCTZ 25 mg daily  /92  Likely has some sleep apnea as well   Will trial lasix

## 2024-01-21 NOTE — ASSESSMENT & PLAN NOTE
SIRS criteria: Tachycardia, tachypnea  Suspected source: RSV Bronchitis.   Suspect likely due to RSV, monitor off antibiotics  Follow-up with blood cultures  Procal neg x 3   pt to ER w/ report of cough, fever, nausea and nasal congestion for several days.  Pt seen at urgent care in [place] yesterday and dx'd with sinus infection w/ prescription for amoxicillin which he started yesterday.  pt denies cp/sob.  Pt feeding and eliminating well.  Breathing unlabored, skin warm and dry. Will continue to monitor.

## 2024-01-21 NOTE — PROGRESS NOTES
[facility]  Progress Note  Name: Markie Barrios I  MRN: [id number]  Unit/Bed#: W -01 I Date of Admission: 1/18/2024   Date of Service: 1/21/2024 I Hospital Day: 3    Assessment/Plan   * Acute hypoxic respiratory failure (HCC)  Assessment & Plan  Has RSV - on steroids and nebulizers  Oxygen requirements not improving still needing 6 L   Will check dimer   Procal is negative   CT PE study is negative   Will trial small dose of lasix        Hyponatremia  Assessment & Plan  Na is 133  Has gained 2 kg   Will need to monitor I/O and daily weights no urine output noted thus far   Will discuss with nursing - jacinta betancourt and asked them to start monitoring closely.    RSV bronchitis  Assessment & Plan  Patient reports congestion, dry cough, fatigue and rhinorrhea for the past 4 days. Patient was originally seen by his outpatient PCP this morning who recommended patient present to the ED for further evaluation at which time patient refused. Patient returned home and developed worsening shortness of breath which prompted the patient to call for an ambulance.   RSV+ on 01/18/2024  Continue IV Solu-Medrol, notable wheezes noted on exam  Continue guaifenesin, Tessalon Perles as needed  Currently requiring 6 L, wean as able  Still hypoxic - see hypoxia problem      Sepsis (HCC)  Assessment & Plan  SIRS criteria: Tachycardia, tachypnea  Suspected source: RSV Bronchitis.   Suspect likely due to RSV, monitor off antibiotics  Follow-up with blood cultures  Procal neg x 3    BPH (benign prostatic hyperplasia)  Assessment & Plan  Continue Flomax      Benign essential hypertension  Assessment & Plan  Continue to monitor blood pressure at this time  Continue lisinopril 40 mg daily, HCTZ 25 mg daily  /92  Likely has some sleep apnea as well   Will trial lasix                 VTE Pharmacologic Prophylaxis: VTE Score: 7 Moderate Risk (Score 3-4) - Pharmacological DVT Prophylaxis Ordered:  "heparin.        Patient Centered Rounds: I performed bedside rounds with nursing staff today.   Discussions with Specialists or Other Care Team Provider: Discussed with  nursing     Education and Discussions with Family / Patient: Updated  (wife) at bedside.    Total Time Spent on Date of Encounter in care of patient: 30 mins. This time was spent on one or more of the following: performing physical exam; counseling and coordination of care; obtaining or reviewing history; documenting in the medical record; reviewing/ordering tests, medications or procedures; communicating with other healthcare professionals and discussing with patient's family/caregivers.    Current Length of Stay: 3 day(s)  Current Patient Status: Inpatient   Certification Statement: The patient will continue to require additional inpatient hospital stay due to    Discharge Plan: Anticipate discharge in 24-48 hrs to home.    Code Status: Level 1 - Full Code    Subjective:   Patient seen and examined.  No new complaints   Feeling much better but still \"short of breath\"    Objective:     Vitals:   Temp (24hrs), Av.9 °F (36.6 °C), Min:97.8 °F (36.6 °C), Max:98.2 °F (36.8 °C)    Temp:  [97.8 °F (36.6 °C)-98.2 °F (36.8 °C)] 97.8 °F (36.6 °C)  HR:  [76-85] 85  Resp:  [16-17] 16  BP: (141-145)/(83-96) 141/92  SpO2:  [90 %-93 %] 92 %  Body mass index is 36.94 kg/m².     Input and Output Summary (last 24 hours):     Intake/Output Summary (Last 24 hours) at 2024 1737  Last data filed at 2024 1727  Gross per 24 hour   Intake 720 ml   Output 2250 ml   Net -1530 ml       Physical Exam:   Physical Exam  Constitutional:       General: He is not in acute distress.     Appearance: He is obese. He is not ill-appearing, toxic-appearing or diaphoretic.   HENT:      Head: Normocephalic.      Mouth/Throat:      Mouth: Mucous membranes are moist.   Eyes:      General: No scleral icterus.        Right eye: No discharge.         Left eye: No " discharge.      Pupils: Pupils are equal, round, and reactive to light.   Cardiovascular:      Rate and Rhythm: Normal rate.      Heart sounds: No murmur heard.     No friction rub. No gallop.   Pulmonary:      Effort: No respiratory distress.      Breath sounds: No stridor. No wheezing, rhonchi or rales.   Chest:      Chest wall: No tenderness.   Abdominal:      General: There is no distension.      Palpations: There is no mass.      Tenderness: There is no abdominal tenderness. There is no right CVA tenderness, left CVA tenderness, guarding or rebound.      Hernia: No hernia is present.   Musculoskeletal:         General: No swelling, tenderness, deformity or signs of injury.      Right lower leg: No edema.      Left lower leg: No edema.   Skin:     Coloration: Skin is not jaundiced or pale.      Findings: No bruising, erythema or lesion.   Neurological:      Mental Status: He is alert.      Cranial Nerves: No cranial nerve deficit.      Sensory: No sensory deficit.      Motor: No weakness.      Coordination: Coordination normal.      Gait: Gait normal.      Deep Tendon Reflexes: Reflexes normal.   Psychiatric:         Mood and Affect: Mood normal.          Additional Data:     Labs:  Results from last 7 days   Lab Units 01/21/24  0458 01/20/24  0530 01/18/24  1712   WBC Thousand/uL 17.48*   < > 8.37   HEMOGLOBIN g/dL 13.7   < > 13.6   HEMATOCRIT % 38.1   < > 39.1   PLATELETS Thousands/uL 261   < > 187   BANDS PCT % 2  --   --    NEUTROS PCT %  --   --  76*   LYMPHS PCT %  --   --  12*   LYMPHO PCT % 8*   < >  --    MONOS PCT %  --   --  10   MONO PCT % 2*   < >  --    EOS PCT % 0   < > 1    < > = values in this interval not displayed.     Results from last 7 days   Lab Units 01/21/24  0458 01/21/24  0455   SODIUM mmol/L 133* 133*   POTASSIUM mmol/L 3.7 3.7   CHLORIDE mmol/L 100 99   CO2 mmol/L 24 25   BUN mg/dL 22 23   CREATININE mg/dL 0.84 0.85   ANION GAP mmol/L 9 9   CALCIUM mg/dL 9.2 9.3   ALBUMIN g/dL  --   3.7   TOTAL BILIRUBIN mg/dL  --  0.50   ALK PHOS U/L  --  39   ALT U/L  --  35   AST U/L  --  21   GLUCOSE RANDOM mg/dL 147* 149*     Results from last 7 days   Lab Units 01/18/24  1712   INR  1.06             Results from last 7 days   Lab Units 01/20/24  0530 01/18/24  1712   LACTIC ACID mmol/L  --  0.8   PROCALCITONIN ng/ml 0.10 0.12       Lines/Drains:  Invasive Devices       Peripheral Intravenous Line  Duration             Peripheral IV 01/21/24 Distal;Right;Upper;Ventral (anterior) Arm <1 day                          Imaging: No pertinent imaging reviewed.    Recent Cultures (last 7 days):   Results from last 7 days   Lab Units 01/18/24  1741 01/18/24  1712   BLOOD CULTURE  No Growth at 48 hrs. No Growth at 48 hrs.       Last 24 Hours Medication List:   Current Facility-Administered Medications   Medication Dose Route Frequency Provider Last Rate    acetaminophen  650 mg Oral Q6H PRN Kena Stuart, GARRYNP      benzonatate  100 mg Oral TID PRN Kena McGjuanhan, CRNP      enoxaparin  30 mg Subcutaneous Daily Kena McGjuanhan, CRNP      furosemide  20 mg Intravenous Once Iza Hutchison MD      guaiFENesin  600 mg Oral Q12H [facility] Andrew Riddle MD      hydrochlorothiazide  25 mg Oral Daily Kena TEE [place]      ipratropium-albuterol  3 mL Nebulization Q6H PRN Andrew Riddle MD      lisinopril  40 mg Oral Daily Kena Stuart, TEE      methylPREDNISolone sodium succinate  40 mg Intravenous Q8H [facility] TEE [place]      tamsulosin  0.8 mg Oral Daily With Dinner TEE [place]          Today, Patient Was Seen By: Iza Hutchison MD    **Please Note: This note may have been constructed using a voice recognition system.**

## 2024-01-21 NOTE — PLAN OF CARE
Problem: PAIN - ADULT  Goal: Verbalizes/displays adequate comfort level or baseline comfort level  Description: Interventions:  - Encourage patient to monitor pain and request assistance  - Assess pain using appropriate pain scale  - Administer analgesics based on type and severity of pain and evaluate response  - Implement non-pharmacological measures as appropriate and evaluate response  - Consider cultural and social influences on pain and pain management  - Notify physician/advanced practitioner if interventions unsuccessful or patient reports new pain  Outcome: Progressing     Problem: INFECTION - ADULT  Goal: Absence or prevention of progression during hospitalization  Description: INTERVENTIONS:  - Assess and monitor for signs and symptoms of infection  - Monitor lab/diagnostic results  - Monitor all insertion sites, i.e. indwelling lines, tubes, and drains  - Monitor endotracheal if appropriate and nasal secretions for changes in amount and color  - [place] appropriate cooling/warming therapies per order  - Administer medications as ordered  - Instruct and encourage patient and family to use good hand hygiene technique  - Identify and instruct in appropriate isolation precautions for identified infection/condition  Outcome: Progressing  Goal: Absence of fever/infection during neutropenic period  Description: INTERVENTIONS:  - Monitor WBC    Outcome: Progressing     Problem: SAFETY ADULT  Goal: Patient will remain free of falls  Description: INTERVENTIONS:  - Educate patient/family on patient safety including physical limitations  - Instruct patient to call for assistance with activity   - Consult OT/PT to assist with strengthening/mobility   - Keep Call bell within reach  - Keep bed low and locked with side rails adjusted as appropriate  - Keep care items and personal belongings within reach  - Initiate and maintain comfort rounds  - Make Fall Risk Sign visible to staff  - Offer Toileting every  Hours,  in advance of need  - Initiate/Maintain alarm  - Obtain necessary fall risk management equipment:    - Apply yellow socks and bracelet for high fall risk patients  - Consider moving patient to room near nurses station  Outcome: Progressing  Goal: Maintain or return to baseline ADL function  Description: INTERVENTIONS:  -  Assess patient's ability to carry out ADLs; assess patient's baseline for ADL function and identify physical deficits which impact ability to perform ADLs (bathing, care of mouth/teeth, toileting, grooming, dressing, etc.)  - Assess/evaluate cause of self-care deficits   - Assess range of motion  - Assess patient's mobility; develop plan if impaired  - Assess patient's need for assistive devices and provide as appropriate  - Encourage maximum independence but intervene and supervise when necessary  - Involve family in performance of ADLs  - Assess for home care needs following discharge   - Consider OT consult to assist with ADL evaluation and planning for discharge  - Provide patient education as appropriate  Outcome: Progressing  Goal: Maintains/Returns to pre admission functional level  Description: INTERVENTIONS:  - Perform AM-PAC 6 Click Basic Mobility/ Daily Activity assessment daily.  - Set and communicate daily mobility goal to care team and patient/family/caregiver.   - Collaborate with rehabilitation services on mobility goals if consulted  - Perform Range of Motion  times a day.  - Reposition patient every  hours.  - Dangle patient  times a day  - Stand patient  times a day  - Ambulate patient times a day  - Out of bed to chair  times a day   - Out of bed for meals  times a day  - Out of bed for toileting  - Record patient progress and toleration of activity level   Outcome: Progressing     Problem: DISCHARGE PLANNING  Goal: Discharge to home or other facility with appropriate resources  Description: INTERVENTIONS:  - Identify barriers to discharge w/patient and caregiver  - Arrange for  needed discharge resources and transportation as appropriate  - Identify discharge learning needs (meds, wound care, etc.)  - Arrange for interpretive services to assist at discharge as needed  - Refer to Case Management Department for coordinating discharge planning if the patient needs post-hospital services based on physician/advanced practitioner order or complex needs related to functional status, cognitive ability, or social support system  Outcome: Progressing     Problem: Knowledge Deficit  Goal: Patient/family/caregiver demonstrates understanding of disease process, treatment plan, medications, and discharge instructions  Description: Complete learning assessment and assess knowledge base.  Interventions:  - Provide teaching at level of understanding  - Provide teaching via preferred learning methods  Outcome: Progressing

## 2024-01-21 NOTE — PLAN OF CARE
Problem: PAIN - ADULT  Goal: Verbalizes/displays adequate comfort level or baseline comfort level  Description: Interventions:  - Encourage patient to monitor pain and request assistance  - Assess pain using appropriate pain scale  - Administer analgesics based on type and severity of pain and evaluate response  - Implement non-pharmacological measures as appropriate and evaluate response  - Consider cultural and social influences on pain and pain management  - Notify physician/advanced practitioner if interventions unsuccessful or patient reports new pain  Outcome: Progressing     Problem: INFECTION - ADULT  Goal: Absence or prevention of progression during hospitalization  Description: INTERVENTIONS:  - Assess and monitor for signs and symptoms of infection  - Monitor lab/diagnostic results  - Monitor all insertion sites, i.e. indwelling lines, tubes, and drains  - Monitor endotracheal if appropriate and nasal secretions for changes in amount and color  - [place] appropriate cooling/warming therapies per order  - Administer medications as ordered  - Instruct and encourage patient and family to use good hand hygiene technique  - Identify and instruct in appropriate isolation precautions for identified infection/condition  Outcome: Progressing  Goal: Absence of fever/infection during neutropenic period  Description: INTERVENTIONS:  - Monitor WBC    Outcome: Progressing     Problem: SAFETY ADULT  Goal: Patient will remain free of falls  Description: INTERVENTIONS:  - Educate patient/family on patient safety including physical limitations  - Instruct patient to call for assistance with activity   - Consult OT/PT to assist with strengthening/mobility   - Keep Call bell within reach  - Keep bed low and locked with side rails adjusted as appropriate  - Keep care items and personal belongings within reach  - Initiate and maintain comfort rounds  - Make Fall Risk Sign visible to staff  - Offer Toileting every  Hours,  in advance of need  - Initiate/Maintain alarm  - Obtain necessary fall risk management equipment:    - Apply yellow socks and bracelet for high fall risk patients  - Consider moving patient to room near nurses station  Outcome: Progressing  Goal: Maintain or return to baseline ADL function  Description: INTERVENTIONS:  -  Assess patient's ability to carry out ADLs; assess patient's baseline for ADL function and identify physical deficits which impact ability to perform ADLs (bathing, care of mouth/teeth, toileting, grooming, dressing, etc.)  - Assess/evaluate cause of self-care deficits   - Assess range of motion  - Assess patient's mobility; develop plan if impaired  - Assess patient's need for assistive devices and provide as appropriate  - Encourage maximum independence but intervene and supervise when necessary  - Involve family in performance of ADLs  - Assess for home care needs following discharge   - Consider OT consult to assist with ADL evaluation and planning for discharge  - Provide patient education as appropriate  Outcome: Progressing  Goal: Maintains/Returns to pre admission functional level  Description: INTERVENTIONS:  - Perform AM-PAC 6 Click Basic Mobility/ Daily Activity assessment daily.  - Set and communicate daily mobility goal to care team and patient/family/caregiver.   - Collaborate with rehabilitation services on mobility goals if consulted  - Perform Range of Motion  times a day.  - Reposition patient every  hours.  - Dangle patient  times a day  - Stand patient  times a day  - Ambulate patient times a day  - Out of bed to chair  times a day   - Out of bed for meals  times a day  - Out of bed for toileting  - Record patient progress and toleration of activity level   Outcome: Progressing     Problem: DISCHARGE PLANNING  Goal: Discharge to home or other facility with appropriate resources  Description: INTERVENTIONS:  - Identify barriers to discharge w/patient and caregiver  - Arrange for  needed discharge resources and transportation as appropriate  - Identify discharge learning needs (meds, wound care, etc.)  - Arrange for interpretive services to assist at discharge as needed  - Refer to Case Management Department for coordinating discharge planning if the patient needs post-hospital services based on physician/advanced practitioner order or complex needs related to functional status, cognitive ability, or social support system  Outcome: Progressing     Problem: Knowledge Deficit  Goal: Patient/family/caregiver demonstrates understanding of disease process, treatment plan, medications, and discharge instructions  Description: Complete learning assessment and assess knowledge base.  Interventions:  - Provide teaching at level of understanding  - Provide teaching via preferred learning methods  Outcome: Progressing

## 2024-01-21 NOTE — ASSESSMENT & PLAN NOTE
Has RSV - on steroids and nebulizers  Oxygen requirements not improving still needing 6 L   Will check dimer   Procal is negative   CT PE study is negative   Will trial small dose of lasix

## 2024-01-22 VITALS
OXYGEN SATURATION: 91 % | WEIGHT: 283 LBS | SYSTOLIC BLOOD PRESSURE: 117 MMHG | HEART RATE: 79 BPM | TEMPERATURE: 98.3 F | DIASTOLIC BLOOD PRESSURE: 74 MMHG | RESPIRATION RATE: 17 BRPM | HEIGHT: 74 IN | BODY MASS INDEX: 36.32 KG/M2

## 2024-01-22 LAB
ANION GAP SERPL CALCULATED.3IONS-SCNC: 10 MMOL/L
ANION GAP SERPL CALCULATED.3IONS-SCNC: 8 MMOL/L
BASOPHILS # BLD MANUAL: 0 THOUSAND/UL (ref 0–0.1)
BASOPHILS NFR MAR MANUAL: 0 % (ref 0–1)
BUN SERPL-MCNC: 31 MG/DL (ref 5–25)
BUN SERPL-MCNC: 32 MG/DL (ref 5–25)
CALCIUM SERPL-MCNC: 9.4 MG/DL (ref 8.4–10.2)
CALCIUM SERPL-MCNC: 9.5 MG/DL (ref 8.4–10.2)
CHLORIDE SERPL-SCNC: 96 MMOL/L (ref 96–108)
CHLORIDE SERPL-SCNC: 97 MMOL/L (ref 96–108)
CO2 SERPL-SCNC: 24 MMOL/L (ref 21–32)
CO2 SERPL-SCNC: 26 MMOL/L (ref 21–32)
CREAT SERPL-MCNC: 1 MG/DL (ref 0.6–1.3)
CREAT SERPL-MCNC: 1.02 MG/DL (ref 0.6–1.3)
EOSINOPHIL # BLD MANUAL: 0 THOUSAND/UL (ref 0–0.4)
EOSINOPHIL NFR BLD MANUAL: 0 % (ref 0–6)
ERYTHROCYTE [DISTWIDTH] IN BLOOD BY AUTOMATED COUNT: 12.5 % (ref 11.6–15.1)
GFR SERPL CREATININE-BSD FRML MDRD: 74 ML/MIN/1.73SQ M
GFR SERPL CREATININE-BSD FRML MDRD: 76 ML/MIN/1.73SQ M
GLUCOSE SERPL-MCNC: 182 MG/DL (ref 65–140)
GLUCOSE SERPL-MCNC: 193 MG/DL (ref 65–140)
HCT VFR BLD AUTO: 40.1 % (ref 36.5–49.3)
HGB BLD-MCNC: 14.3 G/DL (ref 12–17)
LYMPHOCYTES # BLD AUTO: 13 % (ref 14–44)
LYMPHOCYTES # BLD AUTO: 2.7 THOUSAND/UL (ref 0.6–4.47)
MCH RBC QN AUTO: 31.5 PG (ref 26.8–34.3)
MCHC RBC AUTO-ENTMCNC: 35.7 G/DL (ref 31.4–37.4)
MCV RBC AUTO: 88 FL (ref 82–98)
MONOCYTES # BLD AUTO: 0.42 THOUSAND/UL (ref 0–1.22)
MONOCYTES NFR BLD: 2 % (ref 4–12)
NEUTROPHILS # BLD MANUAL: 17.66 THOUSAND/UL (ref 1.85–7.62)
NEUTS SEG NFR BLD AUTO: 85 % (ref 43–75)
PLATELET # BLD AUTO: 337 THOUSANDS/UL (ref 149–390)
PLATELET BLD QL SMEAR: ADEQUATE
PMV BLD AUTO: 9.1 FL (ref 8.9–12.7)
POTASSIUM SERPL-SCNC: 4.1 MMOL/L (ref 3.5–5.3)
POTASSIUM SERPL-SCNC: 4.1 MMOL/L (ref 3.5–5.3)
PROCALCITONIN SERPL-MCNC: 0.07 NG/ML
RBC # BLD AUTO: 4.54 MILLION/UL (ref 3.88–5.62)
RBC MORPH BLD: NORMAL
SODIUM SERPL-SCNC: 130 MMOL/L (ref 135–147)
SODIUM SERPL-SCNC: 131 MMOL/L (ref 135–147)
WBC # BLD AUTO: 20.78 THOUSAND/UL (ref 4.31–10.16)

## 2024-01-22 PROCEDURE — 85027 COMPLETE CBC AUTOMATED: CPT | Performed by: INTERNAL MEDICINE

## 2024-01-22 PROCEDURE — 85007 BL SMEAR W/DIFF WBC COUNT: CPT | Performed by: INTERNAL MEDICINE

## 2024-01-22 PROCEDURE — 99232 SBSQ HOSP IP/OBS MODERATE 35: CPT | Performed by: INTERNAL MEDICINE

## 2024-01-22 PROCEDURE — 80048 BASIC METABOLIC PNL TOTAL CA: CPT | Performed by: INTERNAL MEDICINE

## 2024-01-22 PROCEDURE — 84145 PROCALCITONIN (PCT): CPT | Performed by: INTERNAL MEDICINE

## 2024-01-22 RX ORDER — PREDNISONE 20 MG/1
40 TABLET ORAL DAILY
Status: DISCONTINUED | OUTPATIENT
Start: 2024-01-23 | End: 2024-01-23 | Stop reason: HOSPADM

## 2024-01-22 RX ORDER — SODIUM CHLORIDE 9 MG/ML
125 INJECTION, SOLUTION INTRAVENOUS CONTINUOUS
Status: DISCONTINUED | OUTPATIENT
Start: 2024-01-22 | End: 2024-01-23

## 2024-01-22 RX ORDER — METHYLPREDNISOLONE SODIUM SUCCINATE 40 MG/ML
40 INJECTION, POWDER, LYOPHILIZED, FOR SOLUTION INTRAMUSCULAR; INTRAVENOUS EVERY 12 HOURS SCHEDULED
Status: DISCONTINUED | OUTPATIENT
Start: 2024-01-22 | End: 2024-01-22

## 2024-01-22 RX ADMIN — GUAIFENESIN 600 MG: 600 TABLET ORAL at 10:03

## 2024-01-22 RX ADMIN — SODIUM CHLORIDE 125 ML/HR: 0.9 INJECTION, SOLUTION INTRAVENOUS at 11:43

## 2024-01-22 RX ADMIN — GUAIFENESIN 600 MG: 600 TABLET ORAL at 20:22

## 2024-01-22 RX ADMIN — HYDROCHLOROTHIAZIDE 25 MG: 25 TABLET ORAL at 10:03

## 2024-01-22 RX ADMIN — METHYLPREDNISOLONE SODIUM SUCCINATE 40 MG: 40 INJECTION, POWDER, FOR SOLUTION INTRAMUSCULAR; INTRAVENOUS at 05:33

## 2024-01-22 RX ADMIN — TAMSULOSIN HYDROCHLORIDE 0.8 MG: 0.4 CAPSULE ORAL at 16:35

## 2024-01-22 RX ADMIN — LISINOPRIL 40 MG: 20 TABLET ORAL at 10:03

## 2024-01-22 NOTE — CASE MANAGEMENT
Case Management Assessment & Discharge Planning Note    Patient name Markie Barrios  Location W /W -01 MRN [id number]  : 1955 Date 2024       Current Admission Date: 2024  Current Admission Diagnosis:Acute hypoxic respiratory failure (HCC)   Patient Active Problem List    Diagnosis Date Noted    Hypovolemia 2024    Hyponatremia 2024    Acute hypoxic respiratory failure (HCC) 2024    Sepsis (HCC) 2024    Acute respiratory failure (HCC) 2024    RSV bronchitis 2024    Bilateral primary osteoarthritis of knee 2022    Impaired fasting glucose 2020    Diastasis recti 2019    Snoring 2019    Other male erectile dysfunction 2017    Spinal stenosis of lumbar region 2015    Abnormal electromyogram 2014    BPH (benign prostatic hyperplasia) 2014    DDD (degenerative disc disease), lumbosacral 2014    Low HDL (under 40) 03/15/2013    Benign essential hypertension 03/15/2010      LOS (days): 4  Geometric Mean LOS (GMLOS) (days): 5.1  Days to GMLOS:1.3     OBJECTIVE:    Risk of Unplanned Readmission Score: 11.28         Current admission status: Inpatient       Preferred Pharmacy:   [facility] - [place], [place] - [address] [facility]  [address] [address]  Phone: [phone] Fax: [phone]    [id number] - [place], [place] - [address]  [address] [address]  Phone: [phone] Fax: [phone]    Primary Care Provider: Lonnie Huitron MD    Primary Insurance: MEDICARE  Secondary Insurance: AARP    ASSESSMENT:  Active Health Care Proxies    There are no active Health Care Proxies on file.    Readmission Root Cause  30 Day Readmission: No    Patient Information  Admitted from:: Home  Mental Status: Alert  During Assessment patient was accompanied by: Not accompanied during assessment  Assessment  information provided by:: Patient  Primary Caregiver: Self  Support Systems: Family members  County of Residence: [place]  What [facility] do you live in?: [place]  Living Arrangements: Lives Alone    Activities of Daily Living Prior to Admission  Functional Status: Independent  Completes ADLs independently?: Yes  Ambulates independently?: Yes  Does patient use assisted devices?: No  Does patient currently own DME?: No  Does patient have a history of Outpatient Therapy (PT/OT)?: No  Does the patient have a history of Short-Term Rehab?: No  Does patient have a history of HHC?: No  Does patient currently have HHC?: No    Patient Information Continued  Income Source: Pension/FDC  Does patient have prescription coverage?: Yes  Does patient receive dialysis treatments?: No  Does patient have a history of substance abuse?: No    Means of Transportation  Means of Transport to Appts:: Drives Self    Housing Stability: Low Risk  (1/22/2024)    Housing Stability Vital Sign     Unable to Pay for Housing in the Last Year: No     Number of Places Lived in the Last Year: 1     Unstable Housing in the Last Year: No   Food Insecurity: Unknown (1/22/2024)    Hunger Vital Sign     Worried About Running Out of Food in the Last Year: Never true     Ran Out of Food in the Last Year: Not on file   Transportation Needs: No Transportation Needs (1/22/2024)    PRAPARE - Transportation     Lack of Transportation (Medical): No     Lack of Transportation (Non-Medical): No   Utilities: Not At Risk (1/22/2024)    C Utilities     Threatened with loss of utilities: No     DISCHARGE DETAILS:    Discharge planning discussed with:: patient at bedside     Requested Home Health Care         Is the patient interested in HHC at discharge?: No    DME Referral Provided  Referral made for DME?: No    Other Referral/Resources/Interventions Provided:  Interventions: None Indicated  Referral Comments: Patient admitted to [facility] due to acute hypoxic  respiratory failure. CM met with patient at bedside to complete assessment/ discuss dcp. Patient lives alone, is independent at baseline- uses no DME. Patient has insurance and reliable transportation. No current identified CM needs, however CM remains available if needed.    Would you like to participate in our [facility] service program?  : No - Declined    Treatment Team Recommendation: Home  Discharge Destination Plan:: Home  Transport at Discharge : Family    IMM Given (Date):: 01/22/24  IMM Given to:: Patient (copy provided)

## 2024-01-22 NOTE — PLAN OF CARE
Problem: PAIN - ADULT  Goal: Verbalizes/displays adequate comfort level or baseline comfort level  Description: Interventions:  - Encourage patient to monitor pain and request assistance  - Assess pain using appropriate pain scale  - Administer analgesics based on type and severity of pain and evaluate response  - Implement non-pharmacological measures as appropriate and evaluate response  - Consider cultural and social influences on pain and pain management  - Notify physician/advanced practitioner if interventions unsuccessful or patient reports new pain  Outcome: Progressing     Problem: INFECTION - ADULT  Goal: Absence or prevention of progression during hospitalization  Description: INTERVENTIONS:  - Assess and monitor for signs and symptoms of infection  - Monitor lab/diagnostic results  - Monitor all insertion sites, i.e. indwelling lines, tubes, and drains  - Monitor endotracheal if appropriate and nasal secretions for changes in amount and color  - [place] appropriate cooling/warming therapies per order  - Administer medications as ordered  - Instruct and encourage patient and family to use good hand hygiene technique  - Identify and instruct in appropriate isolation precautions for identified infection/condition  Outcome: Progressing  Goal: Absence of fever/infection during neutropenic period  Description: INTERVENTIONS:  - Monitor WBC    Outcome: Progressing     Problem: SAFETY ADULT  Goal: Patient will remain free of falls  Description: INTERVENTIONS:  - Educate patient/family on patient safety including physical limitations  - Instruct patient to call for assistance with activity   - Consult OT/PT to assist with strengthening/mobility   - Keep Call bell within reach  - Keep bed low and locked with side rails adjusted as appropriate  - Keep care items and personal belongings within reach  - Initiate and maintain comfort rounds  - Make Fall Risk Sign visible to staff  - Offer Toileting every  Hours,  in advance of need  - Initiate/Maintain alarm  - Obtain necessary fall risk management equipment:    - Apply yellow socks and bracelet for high fall risk patients  - Consider moving patient to room near nurses station  Outcome: Progressing  Goal: Maintain or return to baseline ADL function  Description: INTERVENTIONS:  -  Assess patient's ability to carry out ADLs; assess patient's baseline for ADL function and identify physical deficits which impact ability to perform ADLs (bathing, care of mouth/teeth, toileting, grooming, dressing, etc.)  - Assess/evaluate cause of self-care deficits   - Assess range of motion  - Assess patient's mobility; develop plan if impaired  - Assess patient's need for assistive devices and provide as appropriate  - Encourage maximum independence but intervene and supervise when necessary  - Involve family in performance of ADLs  - Assess for home care needs following discharge   - Consider OT consult to assist with ADL evaluation and planning for discharge  - Provide patient education as appropriate  Outcome: Progressing  Goal: Maintains/Returns to pre admission functional level  Description: INTERVENTIONS:  - Perform AM-PAC 6 Click Basic Mobility/ Daily Activity assessment daily.  - Set and communicate daily mobility goal to care team and patient/family/caregiver.   - Collaborate with rehabilitation services on mobility goals if consulted  - Perform Range of Motion  times a day.  - Reposition patient every  hours.  - Dangle patient  times a day  - Stand patient  times a day  - Ambulate patient times a day  - Out of bed to chair  times a day   - Out of bed for meals  times a day  - Out of bed for toileting  - Record patient progress and toleration of activity level   Outcome: Progressing     Problem: DISCHARGE PLANNING  Goal: Discharge to home or other facility with appropriate resources  Description: INTERVENTIONS:  - Identify barriers to discharge w/patient and caregiver  - Arrange for  needed discharge resources and transportation as appropriate  - Identify discharge learning needs (meds, wound care, etc.)  - Arrange for interpretive services to assist at discharge as needed  - Refer to Case Management Department for coordinating discharge planning if the patient needs post-hospital services based on physician/advanced practitioner order or complex needs related to functional status, cognitive ability, or social support system  Outcome: Progressing     Problem: Knowledge Deficit  Goal: Patient/family/caregiver demonstrates understanding of disease process, treatment plan, medications, and discharge instructions  Description: Complete learning assessment and assess knowledge base.  Interventions:  - Provide teaching at level of understanding  - Provide teaching via preferred learning methods  Outcome: Progressing

## 2024-01-22 NOTE — PLAN OF CARE
Problem: PAIN - ADULT  Goal: Verbalizes/displays adequate comfort level or baseline comfort level  Description: Interventions:  - Encourage patient to monitor pain and request assistance  - Assess pain using appropriate pain scale  - Administer analgesics based on type and severity of pain and evaluate response  - Implement non-pharmacological measures as appropriate and evaluate response  - Consider cultural and social influences on pain and pain management  - Notify physician/advanced practitioner if interventions unsuccessful or patient reports new pain  1/21/2024 2041 by Bere Laguerre LPN  Outcome: Progressing  1/21/2024 2041 by Bere Laguerre LPN  Outcome: Progressing     Problem: INFECTION - ADULT  Goal: Absence or prevention of progression during hospitalization  Description: INTERVENTIONS:  - Assess and monitor for signs and symptoms of infection  - Monitor lab/diagnostic results  - Monitor all insertion sites, i.e. indwelling lines, tubes, and drains  - Monitor endotracheal if appropriate and nasal secretions for changes in amount and color  - [place] appropriate cooling/warming therapies per order  - Administer medications as ordered  - Instruct and encourage patient and family to use good hand hygiene technique  - Identify and instruct in appropriate isolation precautions for identified infection/condition  1/21/2024 2041 by Bere Laguerre LPN  Outcome: Progressing  1/21/2024 2041 by Bere Laguerre LPN  Outcome: Progressing  Goal: Absence of fever/infection during neutropenic period  Description: INTERVENTIONS:  - Monitor WBC    1/21/2024 2041 by Bere Laguerre LPN  Outcome: Progressing  1/21/2024 2041 by Bere Laguerre LPN  Outcome: Not Progressing     Problem: SAFETY ADULT  Goal: Patient will remain free of falls  Description: INTERVENTIONS:  - Educate patient/family on patient safety including physical limitations  - Instruct patient to call for assistance with activity   - Consult OT/PT to  assist with strengthening/mobility   - Keep Call bell within reach  - Keep bed low and locked with side rails adjusted as appropriate  - Keep care items and personal belongings within reach  - Initiate and maintain comfort rounds  - Make Fall Risk Sign visible to staff  - Offer Toileting every  Hours, in advance of need  - Initiate/Maintain alarm  - Obtain necessary fall risk management equipment:    - Apply yellow socks and bracelet for high fall risk patients  - Consider moving patient to room near nurses station  1/21/2024 2041 by Bere Laguerre LPN  Outcome: Progressing  1/21/2024 2041 by Bere Laguerre LPN  Outcome: Not Progressing  Goal: Maintain or return to baseline ADL function  Description: INTERVENTIONS:  -  Assess patient's ability to carry out ADLs; assess patient's baseline for ADL function and identify physical deficits which impact ability to perform ADLs (bathing, care of mouth/teeth, toileting, grooming, dressing, etc.)  - Assess/evaluate cause of self-care deficits   - Assess range of motion  - Assess patient's mobility; develop plan if impaired  - Assess patient's need for assistive devices and provide as appropriate  - Encourage maximum independence but intervene and supervise when necessary  - Involve family in performance of ADLs  - Assess for home care needs following discharge   - Consider OT consult to assist with ADL evaluation and planning for discharge  - Provide patient education as appropriate  1/21/2024 2041 by Bere Laguerre LPN  Outcome: Progressing  1/21/2024 2041 by Bere Laguerre LPN  Outcome: Not Progressing  Goal: Maintains/Returns to pre admission functional level  Description: INTERVENTIONS:  - Perform AM-PAC 6 Click Basic Mobility/ Daily Activity assessment daily.  - Set and communicate daily mobility goal to care team and patient/family/caregiver.   - Collaborate with rehabilitation services on mobility goals if consulted  - Perform Range of Motion  times a day.  -  Reposition patient every  hours.  - Dangle patient  times a day  - Stand patient  times a day  - Ambulate patient times a day  - Out of bed to chair  times a day   - Out of bed for meals  times a day  - Out of bed for toileting  - Record patient progress and toleration of activity level   1/21/2024 2041 by Beer Laguerre LPN  Outcome: Progressing  1/21/2024 2041 by Bere Laguerre LPN  Outcome: Not Progressing     Problem: DISCHARGE PLANNING  Goal: Discharge to home or other facility with appropriate resources  Description: INTERVENTIONS:  - Identify barriers to discharge w/patient and caregiver  - Arrange for needed discharge resources and transportation as appropriate  - Identify discharge learning needs (meds, wound care, etc.)  - Arrange for interpretive services to assist at discharge as needed  - Refer to Case Management Department for coordinating discharge planning if the patient needs post-hospital services based on physician/advanced practitioner order or complex needs related to functional status, cognitive ability, or social support system  1/21/2024 2041 by Bere Laguerre LPN  Outcome: Progressing  1/21/2024 2041 by Bere Laguerre LPN  Outcome: Not Progressing     Problem: Knowledge Deficit  Goal: Patient/family/caregiver demonstrates understanding of disease process, treatment plan, medications, and discharge instructions  Description: Complete learning assessment and assess knowledge base.  Interventions:  - Provide teaching at level of understanding  - Provide teaching via preferred learning methods  1/21/2024 2041 by Bere Laguerre LPN  Outcome: Progressing  1/21/2024 2041 by Bere Laguerre LPN  Outcome: Not Progressing

## 2024-01-23 ENCOUNTER — TELEPHONE (OUTPATIENT)
Age: 69
End: 2024-01-23

## 2024-01-23 ENCOUNTER — TRANSITIONAL CARE MANAGEMENT (OUTPATIENT)
Dept: FAMILY MEDICINE CLINIC | Facility: CLINIC | Age: 69
End: 2024-01-23

## 2024-01-23 DIAGNOSIS — J20.9 ACUTE BRONCHITIS, UNSPECIFIED ORGANISM: ICD-10-CM

## 2024-01-23 PROBLEM — J96.01 ACUTE HYPOXIC RESPIRATORY FAILURE (HCC): Status: RESOLVED | Noted: 2024-01-20 | Resolved: 2024-01-23

## 2024-01-23 PROBLEM — A41.9 SEPSIS (HCC): Status: RESOLVED | Noted: 2024-01-18 | Resolved: 2024-01-23

## 2024-01-23 PROBLEM — A41.89 VIRAL SEPSIS: Status: RESOLVED | Noted: 2024-01-18 | Resolved: 2024-01-23

## 2024-01-23 PROBLEM — B97.89 VIRAL SEPSIS: Status: RESOLVED | Noted: 2024-01-18 | Resolved: 2024-01-23

## 2024-01-23 LAB
ANION GAP SERPL CALCULATED.3IONS-SCNC: 6 MMOL/L
BACTERIA BLD CULT: NORMAL
BACTERIA BLD CULT: NORMAL
BUN SERPL-MCNC: 30 MG/DL (ref 5–25)
CALCIUM SERPL-MCNC: 8.9 MG/DL (ref 8.4–10.2)
CHLORIDE SERPL-SCNC: 100 MMOL/L (ref 96–108)
CO2 SERPL-SCNC: 27 MMOL/L (ref 21–32)
CREAT SERPL-MCNC: 1.03 MG/DL (ref 0.6–1.3)
GFR SERPL CREATININE-BSD FRML MDRD: 73 ML/MIN/1.73SQ M
GLUCOSE SERPL-MCNC: 100 MG/DL (ref 65–140)
POTASSIUM SERPL-SCNC: 4 MMOL/L (ref 3.5–5.3)
SODIUM SERPL-SCNC: 133 MMOL/L (ref 135–147)

## 2024-01-23 PROCEDURE — 99239 HOSP IP/OBS DSCHRG MGMT >30: CPT | Performed by: STUDENT IN AN ORGANIZED HEALTH CARE EDUCATION/TRAINING PROGRAM

## 2024-01-23 PROCEDURE — 80048 BASIC METABOLIC PNL TOTAL CA: CPT | Performed by: INTERNAL MEDICINE

## 2024-01-23 RX ORDER — GUAIFENESIN 600 MG/1
600 TABLET, EXTENDED RELEASE ORAL EVERY 12 HOURS SCHEDULED
Qty: 30 TABLET | Refills: 0 | Status: SHIPPED | OUTPATIENT
Start: 2024-01-23 | End: 2024-02-07

## 2024-01-23 RX ORDER — METHYLPREDNISOLONE 4 MG/1
TABLET ORAL
Qty: 1 EACH | Refills: 0 | Status: SHIPPED | OUTPATIENT
Start: 2024-01-23

## 2024-01-23 RX ORDER — BENZONATATE 100 MG/1
100 CAPSULE ORAL 3 TIMES DAILY PRN
Qty: 30 CAPSULE | Refills: 0 | Status: SHIPPED | OUTPATIENT
Start: 2024-01-23

## 2024-01-23 NOTE — PLAN OF CARE
Problem: PAIN - ADULT  Goal: Verbalizes/displays adequate comfort level or baseline comfort level  Description: Interventions:  - Encourage patient to monitor pain and request assistance  - Assess pain using appropriate pain scale  - Administer analgesics based on type and severity of pain and evaluate response  - Implement non-pharmacological measures as appropriate and evaluate response  - Consider cultural and social influences on pain and pain management  - Notify physician/advanced practitioner if interventions unsuccessful or patient reports new pain  Outcome: Progressing     Problem: INFECTION - ADULT  Goal: Absence or prevention of progression during hospitalization  Description: INTERVENTIONS:  - Assess and monitor for signs and symptoms of infection  - Monitor lab/diagnostic results  - Monitor all insertion sites, i.e. indwelling lines, tubes, and drains  - Monitor endotracheal if appropriate and nasal secretions for changes in amount and color  - [place] appropriate cooling/warming therapies per order  - Administer medications as ordered  - Instruct and encourage patient and family to use good hand hygiene technique  - Identify and instruct in appropriate isolation precautions for identified infection/condition  Outcome: Progressing  Goal: Absence of fever/infection during neutropenic period  Description: INTERVENTIONS:  - Monitor WBC    Outcome: Progressing     Problem: SAFETY ADULT  Goal: Patient will remain free of falls  Description: INTERVENTIONS:  - Educate patient/family on patient safety including physical limitations  - Instruct patient to call for assistance with activity   - Consult OT/PT to assist with strengthening/mobility   - Keep Call bell within reach  - Keep bed low and locked with side rails adjusted as appropriate  - Keep care items and personal belongings within reach  - Initiate and maintain comfort rounds  - Make Fall Risk Sign visible to staff  - Offer Toileting every  Hours,  in advance of need  - Initiate/Maintain alarm  - Obtain necessary fall risk management equipment:    - Apply yellow socks and bracelet for high fall risk patients  - Consider moving patient to room near nurses station  Outcome: Progressing  Goal: Maintain or return to baseline ADL function  Description: INTERVENTIONS:  -  Assess patient's ability to carry out ADLs; assess patient's baseline for ADL function and identify physical deficits which impact ability to perform ADLs (bathing, care of mouth/teeth, toileting, grooming, dressing, etc.)  - Assess/evaluate cause of self-care deficits   - Assess range of motion  - Assess patient's mobility; develop plan if impaired  - Assess patient's need for assistive devices and provide as appropriate  - Encourage maximum independence but intervene and supervise when necessary  - Involve family in performance of ADLs  - Assess for home care needs following discharge   - Consider OT consult to assist with ADL evaluation and planning for discharge  - Provide patient education as appropriate  Outcome: Progressing  Goal: Maintains/Returns to pre admission functional level  Description: INTERVENTIONS:  - Perform AM-PAC 6 Click Basic Mobility/ Daily Activity assessment daily.  - Set and communicate daily mobility goal to care team and patient/family/caregiver.   - Collaborate with rehabilitation services on mobility goals if consulted  - Perform Range of Motion  times a day.  - Reposition patient every  hours.  - Dangle patient  times a day  - Stand patient  times a day  - Ambulate patient times a day  - Out of bed to chair  times a day   - Out of bed for meals  times a day  - Out of bed for toileting  - Record patient progress and toleration of activity level   Outcome: Progressing     Problem: DISCHARGE PLANNING  Goal: Discharge to home or other facility with appropriate resources  Description: INTERVENTIONS:  - Identify barriers to discharge w/patient and caregiver  - Arrange for  needed discharge resources and transportation as appropriate  - Identify discharge learning needs (meds, wound care, etc.)  - Arrange for interpretive services to assist at discharge as needed  - Refer to Case Management Department for coordinating discharge planning if the patient needs post-hospital services based on physician/advanced practitioner order or complex needs related to functional status, cognitive ability, or social support system  Outcome: Progressing     Problem: Knowledge Deficit  Goal: Patient/family/caregiver demonstrates understanding of disease process, treatment plan, medications, and discharge instructions  Description: Complete learning assessment and assess knowledge base.  Interventions:  - Provide teaching at level of understanding  - Provide teaching via preferred learning methods  Outcome: Progressing

## 2024-01-23 NOTE — ASSESSMENT & PLAN NOTE
Na is 130 after 2.5 L diuresis over night   Gave gentle fluids and rechecked and still 130  Discussed with patient -he will stay till tomorrow to follow up sodium   He wants to leave in the morning so labs placed for 4 am and signed out to night team to ensure these are ready for day team    Discussed with patient that after he is done with steroid taper may need diuretics  Also discussed that PCP should check Na in 2-3 days.

## 2024-01-23 NOTE — ASSESSMENT & PLAN NOTE
Na is 130 after 2.5 L diuresis over night   Gave gentle fluids and rechecked and still 130  Discussed with patient -he will stay till tomorrow to follow up sodium   He wants to leave in the morning so labs placed for 4 am and signed out to night team to ensure these are ready for day team    Discussed with patient that after he is done with steroid taper may need diuretics  Also discussed that PCP should check Na in 2-3 days.  Na improved    F/u with PCP, BMP 1 week

## 2024-01-23 NOTE — TELEPHONE ENCOUNTER
Patient called and stated that some time between his hospital visit and being sent him he had misplaced the medication.     Reason for call:   [x] Refill   [] Prior Auth  [] Other:     Office:   [x] PCP/Provider -   [] Specialty/Provider -     Medication:   Benzonatate 100mg capsule- take 1 capsule by mouth 3 times a day as needed for cough    Pharmacy: [facility] [place]    Does the patient have enough for 3 days?   [] Yes   [x] No - Send as HP to POD

## 2024-01-23 NOTE — ASSESSMENT & PLAN NOTE
Patient reports congestion, dry cough, fatigue and rhinorrhea for the past 4 days. Patient was originally seen by his outpatient PCP this morning who recommended patient present to the ED for further evaluation at which time patient refused. Patient returned home and developed worsening shortness of breath which prompted the patient to call for an ambulance.   RSV+ on 01/18/2024  Continue IV Solu-Medrol, notable wheezes noted on exam  Improved with steroids and now not requiring oxygen  DC on rapid oral steroid taper

## 2024-01-23 NOTE — TELEPHONE ENCOUNTER
Markie called to schedule a TCM appt, was discharged today.    I tried to warm transfer and while holding phone pt hanged up the phone.    Please call pt to schedule appt

## 2024-01-23 NOTE — DISCHARGE INSTR - AVS FIRST PAGE
You are to follow-up with your PCP next week    You will be discharged with a steroid taper/Medrol Dosepak    You are to complete blood work in 1 week    A referral for sleep study has been placed.

## 2024-01-23 NOTE — PLAN OF CARE
Problem: PAIN - ADULT  Goal: Verbalizes/displays adequate comfort level or baseline comfort level  Description: Interventions:  - Encourage patient to monitor pain and request assistance  - Assess pain using appropriate pain scale  - Administer analgesics based on type and severity of pain and evaluate response  - Implement non-pharmacological measures as appropriate and evaluate response  - Consider cultural and social influences on pain and pain management  - Notify physician/advanced practitioner if interventions unsuccessful or patient reports new pain  1/23/2024 0745 by Parisa Junior  Outcome: Adequate for Discharge  1/23/2024 0713 by Parisa Junior  Outcome: Progressing     Problem: INFECTION - ADULT  Goal: Absence or prevention of progression during hospitalization  Description: INTERVENTIONS:  - Assess and monitor for signs and symptoms of infection  - Monitor lab/diagnostic results  - Monitor all insertion sites, i.e. indwelling lines, tubes, and drains  - Monitor endotracheal if appropriate and nasal secretions for changes in amount and color  - Goodwin appropriate cooling/warming therapies per order  - Administer medications as ordered  - Instruct and encourage patient and family to use good hand hygiene technique  - Identify and instruct in appropriate isolation precautions for identified infection/condition  1/23/2024 0745 by Parisa Junior  Outcome: Adequate for Discharge  1/23/2024 0713 by Parisa Junior  Outcome: Progressing  Goal: Absence of fever/infection during neutropenic period  Description: INTERVENTIONS:  - Monitor WBC    1/23/2024 0745 by Parisa Junior  Outcome: Adequate for Discharge  1/23/2024 0713 by Parisa Junior  Outcome: Progressing     Problem: SAFETY ADULT  Goal: Patient will remain free of falls  Description: INTERVENTIONS:  - Educate patient/family on patient safety including physical limitations  - Instruct patient to call for assistance with activity   - Consult OT/PT to assist with  strengthening/mobility   - Keep Call bell within reach  - Keep bed low and locked with side rails adjusted as appropriate  - Keep care items and personal belongings within reach  - Initiate and maintain comfort rounds  - Make Fall Risk Sign visible to staff  - Offer Toileting every  Hours, in advance of need  - Initiate/Maintain alarm  - Obtain necessary fall risk management equipment:    - Apply yellow socks and bracelet for high fall risk patients  - Consider moving patient to room near nurses station  1/23/2024 0745 by Parisa Junior  Outcome: Adequate for Discharge  1/23/2024 0713 by Parisa Junior  Outcome: Progressing  Goal: Maintain or return to baseline ADL function  Description: INTERVENTIONS:  -  Assess patient's ability to carry out ADLs; assess patient's baseline for ADL function and identify physical deficits which impact ability to perform ADLs (bathing, care of mouth/teeth, toileting, grooming, dressing, etc.)  - Assess/evaluate cause of self-care deficits   - Assess range of motion  - Assess patient's mobility; develop plan if impaired  - Assess patient's need for assistive devices and provide as appropriate  - Encourage maximum independence but intervene and supervise when necessary  - Involve family in performance of ADLs  - Assess for home care needs following discharge   - Consider OT consult to assist with ADL evaluation and planning for discharge  - Provide patient education as appropriate  1/23/2024 0745 by Parisa Junior  Outcome: Adequate for Discharge  1/23/2024 0713 by Parisa Junior  Outcome: Progressing  Goal: Maintains/Returns to pre admission functional level  Description: INTERVENTIONS:  - Perform AM-PAC 6 Click Basic Mobility/ Daily Activity assessment daily.  - Set and communicate daily mobility goal to care team and patient/family/caregiver.   - Collaborate with rehabilitation services on mobility goals if consulted  - Perform Range of Motion  times a day.  - Reposition patient every   hours.  - Dangle patient  times a day  - Stand patient  times a day  - Ambulate patient times a day  - Out of bed to chair  times a day   - Out of bed for meals  times a day  - Out of bed for toileting  - Record patient progress and toleration of activity level   1/23/2024 0745 by Parisa Junior  Outcome: Adequate for Discharge  1/23/2024 0713 by Parisa Junior  Outcome: Progressing     Problem: DISCHARGE PLANNING  Goal: Discharge to home or other facility with appropriate resources  Description: INTERVENTIONS:  - Identify barriers to discharge w/patient and caregiver  - Arrange for needed discharge resources and transportation as appropriate  - Identify discharge learning needs (meds, wound care, etc.)  - Arrange for interpretive services to assist at discharge as needed  - Refer to Case Management Department for coordinating discharge planning if the patient needs post-hospital services based on physician/advanced practitioner order or complex needs related to functional status, cognitive ability, or social support system  1/23/2024 0745 by Parisa Junior  Outcome: Adequate for Discharge  1/23/2024 0713 by Parisa Junior  Outcome: Progressing     Problem: Knowledge Deficit  Goal: Patient/family/caregiver demonstrates understanding of disease process, treatment plan, medications, and discharge instructions  Description: Complete learning assessment and assess knowledge base.  Interventions:  - Provide teaching at level of understanding  - Provide teaching via preferred learning methods  1/23/2024 0745 by Parisa Junior  Outcome: Adequate for Discharge  1/23/2024 0713 by Parisa Junior  Outcome: Progressing

## 2024-01-23 NOTE — ASSESSMENT & PLAN NOTE
SIRS criteria: Tachycardia, tachypnea  Suspected source: RSV Bronchitis.   Suspect likely due to RSV, monitor off antibiotics  blood cultures NGTD  Procal neg x 3  Resolved

## 2024-01-23 NOTE — PROGRESS NOTES
Formerly [facility], [facility]–1983  Progress Note  Name: Markie Barrios I  MRN: [id number]  Unit/Bed#: W -01 I Date of Admission: 1/18/2024   Date of Service: 1/22/2024 I Hospital Day: 4    Assessment/Plan   * Acute hypoxic respiratory failure (HCC)  Assessment & Plan  Has RSV - on steroids and nebulizers  Oxygen requirementsresolved  Stable for DC        Hyponatremia  Assessment & Plan  Na is 130 after 2.5 L diuresis over night   Gave gentle fluids and rechecked and still 130  Discussed with patient -he will stay till tomorrow to follow up sodium   He wants to leave in the morning so labs placed for 4 am and signed out to night team to ensure these are ready for day team    Discussed with patient that after he is done with steroid taper may need diuretics  Also discussed that PCP should check Na in 2-3 days.    RSV bronchitis  Assessment & Plan  Patient reports congestion, dry cough, fatigue and rhinorrhea for the past 4 days. Patient was originally seen by his outpatient PCP this morning who recommended patient present to the ED for further evaluation at which time patient refused. Patient returned home and developed worsening shortness of breath which prompted the patient to call for an ambulance.   RSV+ on 01/18/2024  Continue IV Solu-Medrol, notable wheezes noted on exam  Improved with steroids and now not requiring oxygen  DC on rapid oral steroid taper    Sepsis (HCC)  Assessment & Plan  SIRS criteria: Tachycardia, tachypnea  Suspected source: RSV Bronchitis.   Suspect likely due to RSV, monitor off antibiotics  Follow-up with blood cultures  Procal neg x 3  Resolved      BPH (benign prostatic hyperplasia)  Assessment & Plan  Continue Flomax      Benign essential hypertension  Assessment & Plan  Continue to monitor blood pressure at this time  Continue lisinopril 40 mg daily, HCTZ 25 mg daily   systolic  Likely has some sleep apnea as well- should get sleep study                VTE  "Pharmacologic Prophylaxis: VTE Score: 7 Moderate Risk (Score 3-4) - Pharmacological DVT Prophylaxis Ordered: heparin.      Patient Centered Rounds: I performed bedside rounds with nursing staff today.   Discussions with Specialists or Other Care Team Provider: Discussed with nursin.    Education and Discussions with Family / Patient:  did not update familiy.Patient wants to do this himself..     Total Time Spent on Date of Encounter in care of patient: 30 mins. This time was spent on one or more of the following: performing physical exam; counseling and coordination of care; obtaining or reviewing history; documenting in the medical record; reviewing/ordering tests, medications or procedures; communicating with other healthcare professionals and discussing with patient's family/caregivers.    Current Length of Stay: 4 day(s)  Current Patient Status: Inpatient   Certification Statement: The patient will continue to require additional inpatient hospital stay due to monitor sodium   Discharge Plan: Anticipate discharge tomorrow to home.    Code Status: Level 1 - Full Code    Subjective:   Patient seen and examined   Feeling \"great\"    Objective:     Vitals:   Temp (24hrs), Av.6 °F (37 °C), Min:98.3 °F (36.8 °C), Max:99 °F (37.2 °C)    Temp:  [98.3 °F (36.8 °C)-99 °F (37.2 °C)] 98.3 °F (36.8 °C)  HR:  [] 79  Resp:  [17-18] 17  BP: (117-138)/(73-76) 117/74  SpO2:  [90 %-92 %] 91 %  Body mass index is 36.34 kg/m².     Input and Output Summary (last 24 hours):     Intake/Output Summary (Last 24 hours) at 2024  Last data filed at 2024 0501  Gross per 24 hour   Intake 480 ml   Output --   Net 480 ml       Physical Exam:   Physical Exam  Constitutional:       Appearance: He is not ill-appearing or diaphoretic.   HENT:      Head: Normocephalic.   Eyes:      Pupils: Pupils are equal, round, and reactive to light.   Cardiovascular:      Rate and Rhythm: Normal rate.      Heart sounds: No murmur " heard.  Pulmonary:      Effort: No respiratory distress.      Breath sounds: No stridor. No wheezing, rhonchi or rales.   Chest:      Chest wall: No tenderness.   Abdominal:      General: There is no distension.      Palpations: There is no mass.      Tenderness: There is no abdominal tenderness. There is no right CVA tenderness, left CVA tenderness, guarding or rebound.      Hernia: No hernia is present.   Musculoskeletal:      Right lower leg: No edema.      Left lower leg: No edema.   Skin:     Coloration: Skin is not jaundiced or pale.      Findings: No bruising, erythema, lesion or rash.   Neurological:      General: No focal deficit present.      Mental Status: He is alert.   Psychiatric:         Mood and Affect: Mood normal.        Additional Data:     Labs:  Results from last 7 days   Lab Units 01/22/24  1037 01/21/24  0458 01/20/24  0530 01/18/24  1712   WBC Thousand/uL 20.78* 17.48*   < > 8.37   HEMOGLOBIN g/dL 14.3 13.7   < > 13.6   HEMATOCRIT % 40.1 38.1   < > 39.1   PLATELETS Thousands/uL 337 261   < > 187   BANDS PCT %  --  2  --   --    NEUTROS PCT %  --   --   --  76*   LYMPHS PCT %  --   --   --  12*   LYMPHO PCT % 13* 8*   < >  --    MONOS PCT %  --   --   --  10   MONO PCT % 2* 2*   < >  --    EOS PCT % 0 0   < > 1    < > = values in this interval not displayed.     Results from last 7 days   Lab Units 01/22/24  1412 01/21/24  0458 01/21/24  0455   SODIUM mmol/L 130*   < > 133*   POTASSIUM mmol/L 4.1   < > 3.7   CHLORIDE mmol/L 96   < > 99   CO2 mmol/L 26   < > 25   BUN mg/dL 31*   < > 23   CREATININE mg/dL 1.02   < > 0.85   ANION GAP mmol/L 8   < > 9   CALCIUM mg/dL 9.5   < > 9.3   ALBUMIN g/dL  --   --  3.7   TOTAL BILIRUBIN mg/dL  --   --  0.50   ALK PHOS U/L  --   --  39   ALT U/L  --   --  35   AST U/L  --   --  21   GLUCOSE RANDOM mg/dL 182*   < > 149*    < > = values in this interval not displayed.     Results from last 7 days   Lab Units 01/18/24  1712   INR  1.06             Results from  last 7 days   Lab Units 01/22/24  1037 01/20/24  0530 01/18/24  1712   LACTIC ACID mmol/L  --   --  0.8   PROCALCITONIN ng/ml 0.07 0.10 0.12       Lines/Drains:  Invasive Devices       Peripheral Intravenous Line  Duration             Peripheral IV 01/21/24 Distal;Right;Upper;Ventral (anterior) Arm 1 day                          Imaging: No pertinent imaging reviewed.    Recent Cultures (last 7 days):   Results from last 7 days   Lab Units 01/18/24  1741 01/18/24  1712   BLOOD CULTURE  No Growth After 4 Days. No Growth After 4 Days.       Last 24 Hours Medication List:   Current Facility-Administered Medications   Medication Dose Route Frequency Provider Last Rate   • acetaminophen  650 mg Oral Q6H PRN TEE [place]     • benzonatate  100 mg Oral TID PRN TEE [place]     • enoxaparin  30 mg Subcutaneous Daily TEE [place]     • guaiFENesin  600 mg Oral Q12H NOEMI Andrew Riddle MD     • hydrochlorothiazide  25 mg Oral Daily TEE [place]     • ipratropium-albuterol  3 mL Nebulization Q6H PRN Andrew Riddle MD     • lisinopril  40 mg Oral Daily TEE [place]     • [START ON 1/23/2024] predniSONE  40 mg Oral Daily Iza Hutchison MD     • sodium chloride  125 mL/hr Intravenous Continuous Iza Hutchison  mL/hr (01/22/24 1143)   • tamsulosin  0.8 mg Oral Daily With Dinner TEE Rome          Today, Patient Was Seen By: Iza Hutchison MD    **Please Note: This note may have been constructed using a voice recognition system.**

## 2024-01-23 NOTE — DISCHARGE SUMMARY
Critical access hospital  Discharge- Markie Barrios 1955, 68 y.o. male MRN: [id number]  Unit/Bed#: W -01 Encounter: [id number]  Primary Care Provider: Lonnie Huitron MD   Date and time admitted to hospital: 1/18/2024  4:19 PM    * Acute hypoxic respiratory failure (HCC)-resolved as of 1/23/2024  Assessment & Plan  Has RSV - on steroids and nebulizers  Oxygen requirementsresolved  Stable for DC        RSV bronchitis  Assessment & Plan  Patient reports congestion, dry cough, fatigue and rhinorrhea for the past 4 days. Patient was originally seen by his outpatient PCP this morning who recommended patient present to the ED for further evaluation at which time patient refused. Patient returned home and developed worsening shortness of breath which prompted the patient to call for an ambulance.   RSV+ on 01/18/2024  Continue IV Solu-Medrol, notable wheezes noted on exam  Improved with steroids and now not requiring oxygen  DC on rapid oral steroid taper    Viral sepsis -resolved as of 1/23/2024  Assessment & Plan  SIRS criteria: Tachycardia, tachypnea  Suspected source: RSV Bronchitis.   Suspect likely due to RSV, monitor off antibiotics  blood cultures NGTD  Procal neg x 3  Resolved      Hyponatremia  Assessment & Plan  Na is 130 after 2.5 L diuresis over night   Gave gentle fluids and rechecked and still 130  Discussed with patient -he will stay till tomorrow to follow up sodium   He wants to leave in the morning so labs placed for 4 am and signed out to night team to ensure these are ready for day team    Discussed with patient that after he is done with steroid taper may need diuretics  Also discussed that PCP should check Na in 2-3 days.  Na improved    F/u with PCP, BMP 1 week    BPH (benign prostatic hyperplasia)  Assessment & Plan  Continue Flomax      Benign essential hypertension  Assessment & Plan  Continue to monitor blood pressure at this time  Continue lisinopril 40 mg  daily, HCTZ 25 mg daily   systolic  Likely has some sleep apnea as well- should get sleep study         Medical Problems       Resolved Problems  Date Reviewed: 1/22/2024            Resolved    Viral sepsis  1/23/2024     Resolved by  Jojo Graff MD    * (Principal) Acute hypoxic respiratory failure (HCC) 1/23/2024     Resolved by  Jojo Graff MD        Discharging Physician / Practitioner: Jojo Graff MD  PCP: Lonnie Huitron MD  Admission Date:   Admission Orders (From admission, onward)       Ordered        01/18/24 2030  INPATIENT ADMISSION  Once                          Discharge Date: 01/23/24    Consultations During Hospital Stay:  CM    Procedures Performed:   CTA chest pe study   Final Result      No large pulmonary embolus in the main pulmonary artery. Nondiagnostic evaluation of the remainder of the pulmonary arterial tree due to the limitations of the study.                  Workstation performed: [id number]         XR chest 1 view portable   Final Result      No acute cardiopulmonary disease.                  Workstation performed: [id number]               Significant Findings / Test Results:   none    Incidental Findings:   none       Test Results Pending at Discharge (will require follow up):   none     Outpatient Tests Requested:  BMP 1 week    Complications:  none known at this time    Reason for Admission: acute resp failure 2/2 RSV    Hospital Course:   Markei Barrios is a 68 y.o. male patient who originally presented to the hospital on 1/18/2024 due to shortness of breath.  In the ED patient was found to have acute respiratory failure secondary to viral sepsis secondary to RSV.  Blood cultures were drawn.  Patient initially requiring 6 L nasal cannula.  Patient was started on Solu-Medrol.  Was also given azithromycin.  Patient sodium was found to be low and was given some IV fluids.  Oxygen requirements were gradually weaned down to room air.  On day of discharge  "sodium levels improved.  He has otherwise remained hemodynamically stable afebrile in no acute respiratory distress.  Blood cultures were negative.  He has been medically cleared for discharge by internal medicine.  He is to follow-up with his PCP.  He is to obtain a sleep study and and he will be discharged on steroid taper.  He is to complete a BMP in 1 week.        Please see above list of diagnoses and related plan for additional information.     Condition at Discharge: stable    Discharge Day Visit / Exam:   Subjective:  Markie is seen and examined at bedside.  No acute events overnight.  Discussed plan of care.  All questions and concerns were answered and addressed.  Has no acute complaints at this time.  Is eager to go home.  Vitals: Blood Pressure: 117/74 (01/22/24 2118)  Pulse: 79 (01/22/24 2118)  Temperature: 98.3 °F (36.8 °C) (01/22/24 2118)  Temp Source: Oral (01/18/24 2212)  Respirations: 17 (01/22/24 2118)  Height: 6' 2\" (188 cm) (01/18/24 2212)  Weight - Scale: 128 kg (283 lb) (01/22/24 0542)  SpO2: 91 % (01/22/24 2118)  Exam:   Physical Exam  Vitals and nursing note reviewed.   Constitutional:       General: He is not in acute distress.     Appearance: He is obese. He is not ill-appearing.   HENT:      Head: Normocephalic and atraumatic.   Cardiovascular:      Rate and Rhythm: Normal rate and regular rhythm.      Pulses: Normal pulses.      Heart sounds: Normal heart sounds.   Pulmonary:      Effort: Pulmonary effort is normal.      Breath sounds: Normal breath sounds.   Abdominal:      General: Abdomen is flat. Bowel sounds are normal.      Palpations: Abdomen is soft.   Musculoskeletal:      Right lower leg: No edema.      Left lower leg: No edema.   Skin:     General: Skin is warm.   Neurological:      General: No focal deficit present.      Mental Status: He is alert and oriented to person, place, and time.          Discussion with Family: Patient declined call to .     Discharge " instructions/Information to patient and family:   See after visit summary for information provided to patient and family.      Provisions for Follow-Up Care:  See after visit summary for information related to follow-up care and any pertinent home health orders.      Mobility at time of Discharge:   Basic Mobility Inpatient Raw Score: 23  JH-HLM Goal: 7: Walk 25 feet or more  JH-HLM Achieved: 7: Walk 25 feet or more  HLM Goal achieved. Continue to encourage appropriate mobility.     Disposition:   Home    Planned Readmission: no     Discharge Statement:  I spent 40 minutes discharging the patient. This time was spent on the day of discharge. I had direct contact with the patient on the day of discharge. Greater than 50% of the total time was spent examining patient, answering all patient questions, arranging and discussing plan of care with patient as well as directly providing post-discharge instructions.  Additional time then spent on discharge activities.    Discharge Medications:  See after visit summary for reconciled discharge medications provided to patient and/or family.      **Please Note: This note may have been constructed using a voice recognition system**

## 2024-01-23 NOTE — ASSESSMENT & PLAN NOTE
Continue to monitor blood pressure at this time  Continue lisinopril 40 mg daily, HCTZ 25 mg daily   systolic  Likely has some sleep apnea as well- should get sleep study

## 2024-01-23 NOTE — PLAN OF CARE
Problem: PAIN - ADULT  Goal: Verbalizes/displays adequate comfort level or baseline comfort level  Description: Interventions:  - Encourage patient to monitor pain and request assistance  - Assess pain using appropriate pain scale  - Administer analgesics based on type and severity of pain and evaluate response  - Implement non-pharmacological measures as appropriate and evaluate response  - Consider cultural and social influences on pain and pain management  - Notify physician/advanced practitioner if interventions unsuccessful or patient reports new pain  1/23/2024 0713 by Parisa Junior  Outcome: Progressing  1/22/2024 1715 by Parisa Junior  Outcome: Progressing     Problem: INFECTION - ADULT  Goal: Absence or prevention of progression during hospitalization  Description: INTERVENTIONS:  - Assess and monitor for signs and symptoms of infection  - Monitor lab/diagnostic results  - Monitor all insertion sites, i.e. indwelling lines, tubes, and drains  - Monitor endotracheal if appropriate and nasal secretions for changes in amount and color  - Sherman appropriate cooling/warming therapies per order  - Administer medications as ordered  - Instruct and encourage patient and family to use good hand hygiene technique  - Identify and instruct in appropriate isolation precautions for identified infection/condition  1/23/2024 0713 by Parisa Junior  Outcome: Progressing  1/22/2024 1715 by Parisa Junior  Outcome: Progressing  Goal: Absence of fever/infection during neutropenic period  Description: INTERVENTIONS:  - Monitor WBC    1/23/2024 0713 by Parisa Junior  Outcome: Progressing  1/22/2024 1715 by Parisa Junior  Outcome: Progressing     Problem: SAFETY ADULT  Goal: Patient will remain free of falls  Description: INTERVENTIONS:  - Educate patient/family on patient safety including physical limitations  - Instruct patient to call for assistance with activity   - Consult OT/PT to assist with strengthening/mobility   - Keep  Call bell within reach  - Keep bed low and locked with side rails adjusted as appropriate  - Keep care items and personal belongings within reach  - Initiate and maintain comfort rounds  - Make Fall Risk Sign visible to staff  - Offer Toileting every  Hours, in advance of need  - Initiate/Maintain alarm  - Obtain necessary fall risk management equipment:    - Apply yellow socks and bracelet for high fall risk patients  - Consider moving patient to room near nurses station  1/23/2024 0713 by Parisa Junior  Outcome: Progressing  1/22/2024 1715 by Parisa Junior  Outcome: Progressing  Goal: Maintain or return to baseline ADL function  Description: INTERVENTIONS:  -  Assess patient's ability to carry out ADLs; assess patient's baseline for ADL function and identify physical deficits which impact ability to perform ADLs (bathing, care of mouth/teeth, toileting, grooming, dressing, etc.)  - Assess/evaluate cause of self-care deficits   - Assess range of motion  - Assess patient's mobility; develop plan if impaired  - Assess patient's need for assistive devices and provide as appropriate  - Encourage maximum independence but intervene and supervise when necessary  - Involve family in performance of ADLs  - Assess for home care needs following discharge   - Consider OT consult to assist with ADL evaluation and planning for discharge  - Provide patient education as appropriate  1/23/2024 0713 by Parisa Junior  Outcome: Progressing  1/22/2024 1715 by Parisa Junior  Outcome: Progressing  Goal: Maintains/Returns to pre admission functional level  Description: INTERVENTIONS:  - Perform AM-PAC 6 Click Basic Mobility/ Daily Activity assessment daily.  - Set and communicate daily mobility goal to care team and patient/family/caregiver.   - Collaborate with rehabilitation services on mobility goals if consulted  - Perform Range of Motion  times a day.  - Reposition patient every  hours.  - Dangle patient  times a day  - Stand patient   times a day  - Ambulate patient times a day  - Out of bed to chair  times a day   - Out of bed for meals  times a day  - Out of bed for toileting  - Record patient progress and toleration of activity level   1/23/2024 0713 by Parisa Junior  Outcome: Progressing  1/22/2024 1715 by Parisa Junior  Outcome: Progressing     Problem: DISCHARGE PLANNING  Goal: Discharge to home or other facility with appropriate resources  Description: INTERVENTIONS:  - Identify barriers to discharge w/patient and caregiver  - Arrange for needed discharge resources and transportation as appropriate  - Identify discharge learning needs (meds, wound care, etc.)  - Arrange for interpretive services to assist at discharge as needed  - Refer to Case Management Department for coordinating discharge planning if the patient needs post-hospital services based on physician/advanced practitioner order or complex needs related to functional status, cognitive ability, or social support system  1/23/2024 0713 by Parisa Junior  Outcome: Progressing  1/22/2024 1715 by Parisa Junior  Outcome: Progressing     Problem: Knowledge Deficit  Goal: Patient/family/caregiver demonstrates understanding of disease process, treatment plan, medications, and discharge instructions  Description: Complete learning assessment and assess knowledge base.  Interventions:  - Provide teaching at level of understanding  - Provide teaching via preferred learning methods  1/23/2024 0713 by Parisa Junior  Outcome: Progressing  1/22/2024 1715 by Parisa Junior  Outcome: Progressing

## 2024-01-26 ENCOUNTER — OFFICE VISIT (OUTPATIENT)
Dept: FAMILY MEDICINE CLINIC | Facility: CLINIC | Age: 69
End: 2024-01-26
Payer: MEDICARE

## 2024-01-26 VITALS
OXYGEN SATURATION: 94 % | TEMPERATURE: 97.8 F | DIASTOLIC BLOOD PRESSURE: 88 MMHG | BODY MASS INDEX: 35.49 KG/M2 | SYSTOLIC BLOOD PRESSURE: 148 MMHG | HEIGHT: 74 IN | WEIGHT: 276.5 LBS | HEART RATE: 112 BPM

## 2024-01-26 DIAGNOSIS — J21.0 RSV (ACUTE BRONCHIOLITIS DUE TO RESPIRATORY SYNCYTIAL VIRUS): Primary | ICD-10-CM

## 2024-01-26 DIAGNOSIS — Z09 HOSPITAL DISCHARGE FOLLOW-UP: ICD-10-CM

## 2024-01-26 PROCEDURE — 99495 TRANSJ CARE MGMT MOD F2F 14D: CPT | Performed by: NURSE PRACTITIONER

## 2024-01-26 NOTE — PROGRESS NOTES
Assessment & Plan     1. RSV (acute bronchiolitis due to respiratory syncytial virus)    2. Hospital discharge follow-up         Subjective     Transitional Care Management Review:   Markie Barrios is a 68 y.o. male here for TCM follow up.     During the TCM phone call patient stated:  TCM Call     Date and time call was made  1/23/2024 10:29 AM    Patient was hospitialized at  [facility]    Date of Admission  01/18/24    Date of discharge  01/23/24    Diagnosis  Acute hypoxic respiratory failure    Disposition  Home    Current Symptoms  Cough    Cough Severity  Moderate      TCM Call     Post hospital issues  None    Should patient be enrolled in anticoag monitoring?  No    Scheduled for follow up?  Yes    Did you obtain your prescribed medications  Yes    Do you need help managing your prescriptions or medications  No    Is transportation to your appointment needed  No    I have advised the patient to call PCP with any new or worsening symptoms  Monica Youssef MA    Living Arrangements  Alone    Support System  Friends    The type of support provided  Physical    Do you have social support  Yes, as much as I need        Chief complaint: 68 y.o.male presenting for hospital follow up for acute hypoxic respiratory failure       HMI: Patient was admitted on 01/18/2024 to [facility] for shortness of breath.  Patient discharged on 01/23/2024 with the diagnoses of acute hypoxic respiratory failure (resolved by discharge), RSV bronchitis, viral sepsis (resolved by discharge), hyponatremia, benign prostatic hyperplasia and benign essential hypertension.  Patient had 2.5 liters diuresis in hospital with sodium improving - discussed with patient to have the sodium level rechecked in one week.  Patient continues to have the nasal congestion and cough but reports that the Tessalon Perles are helping with control. He has two more days of the steroid taper that he was discharged on. He  "wants to be able to get out of his house and see his grandchildren so concerned about being contagious. It was explained that the contagious period is usually 2-8 days after infection identify so he should be okay to be out and about with others.    Review of Systems   Constitutional:  Positive for fatigue. Negative for chills and fever.   HENT:  Positive for congestion.    Respiratory:  Positive for cough and shortness of breath. Negative for chest tightness and wheezing.    Cardiovascular: Negative.    Gastrointestinal: Negative.    Musculoskeletal: Negative.    Neurological: Negative.    Psychiatric/Behavioral: Negative.         Objective     /88 (BP Location: Right arm, Patient Position: Sitting, Cuff Size: Large)   Pulse (!) 112   Temp 97.8 °F (36.6 °C)   Ht 6' 2\" (1.88 m)   Wt 125 kg (276 lb 8 oz)   SpO2 94%   BMI 35.50 kg/m²  (Reviewed)    Physical Exam  Vitals reviewed.   Constitutional:       General: He is not in acute distress.     Appearance: He is not ill-appearing.   HENT:      Head: Normocephalic and atraumatic.      Nose: Congestion present.   Eyes:      Extraocular Movements: Extraocular movements intact.      Conjunctiva/sclera: Conjunctivae normal.      Pupils: Pupils are equal, round, and reactive to light.   Cardiovascular:      Rate and Rhythm: Regular rhythm. Tachycardia present.      Heart sounds: Normal heart sounds.   Pulmonary:      Effort: Pulmonary effort is normal. No respiratory distress.      Breath sounds: Rhonchi present. No wheezing.   Skin:     General: Skin is warm and dry.   Neurological:      Mental Status: He is alert and oriented to person, place, and time.   Psychiatric:         Mood and Affect: Mood normal.         Behavior: Behavior normal.       Medications have been reviewed by provider in current encounter    TEE Lima         "

## 2024-01-30 ENCOUNTER — TELEPHONE (OUTPATIENT)
Age: 69
End: 2024-01-30

## 2024-01-30 NOTE — TELEPHONE ENCOUNTER
Pt discharged from the hospital on 01/23/2024.  He was hospitalized with RSV.  He is wondering if he should get the RSV vaccine and the newest Covid vaccine.  Please advise.

## 2024-02-08 ENCOUNTER — APPOINTMENT (OUTPATIENT)
Dept: LAB | Facility: CLINIC | Age: 69
End: 2024-02-08
Payer: MEDICARE

## 2024-02-08 DIAGNOSIS — J20.5 RSV BRONCHITIS: ICD-10-CM

## 2024-02-08 DIAGNOSIS — I10 BENIGN ESSENTIAL HYPERTENSION: ICD-10-CM

## 2024-02-08 DIAGNOSIS — J96.01 ACUTE RESPIRATORY FAILURE WITH HYPOXIA (HCC): ICD-10-CM

## 2024-02-08 LAB
ALBUMIN SERPL BCP-MCNC: 3.9 G/DL (ref 3.5–5)
ALP SERPL-CCNC: 30 U/L (ref 34–104)
ALT SERPL W P-5'-P-CCNC: 29 U/L (ref 7–52)
ANION GAP SERPL CALCULATED.3IONS-SCNC: 9 MMOL/L
AST SERPL W P-5'-P-CCNC: 15 U/L (ref 13–39)
BASOPHILS # BLD AUTO: 0.04 THOUSANDS/ÂΜL (ref 0–0.1)
BASOPHILS NFR BLD AUTO: 1 % (ref 0–1)
BILIRUB SERPL-MCNC: 0.45 MG/DL (ref 0.2–1)
BUN SERPL-MCNC: 23 MG/DL (ref 5–25)
CALCIUM SERPL-MCNC: 9.3 MG/DL (ref 8.4–10.2)
CHLORIDE SERPL-SCNC: 104 MMOL/L (ref 96–108)
CHOLEST SERPL-MCNC: 181 MG/DL
CO2 SERPL-SCNC: 26 MMOL/L (ref 21–32)
CREAT SERPL-MCNC: 0.87 MG/DL (ref 0.6–1.3)
EOSINOPHIL # BLD AUTO: 0.26 THOUSAND/ÂΜL (ref 0–0.61)
EOSINOPHIL NFR BLD AUTO: 4 % (ref 0–6)
ERYTHROCYTE [DISTWIDTH] IN BLOOD BY AUTOMATED COUNT: 12.9 % (ref 11.6–15.1)
GFR SERPL CREATININE-BSD FRML MDRD: 88 ML/MIN/1.73SQ M
GLUCOSE P FAST SERPL-MCNC: 96 MG/DL (ref 65–99)
HCT VFR BLD AUTO: 37 % (ref 36.5–49.3)
HDLC SERPL-MCNC: 44 MG/DL
HGB BLD-MCNC: 12.5 G/DL (ref 12–17)
IMM GRANULOCYTES # BLD AUTO: 0.03 THOUSAND/UL (ref 0–0.2)
IMM GRANULOCYTES NFR BLD AUTO: 1 % (ref 0–2)
LDLC SERPL CALC-MCNC: 110 MG/DL (ref 0–100)
LYMPHOCYTES # BLD AUTO: 1.67 THOUSANDS/ÂΜL (ref 0.6–4.47)
LYMPHOCYTES NFR BLD AUTO: 27 % (ref 14–44)
MCH RBC QN AUTO: 31.6 PG (ref 26.8–34.3)
MCHC RBC AUTO-ENTMCNC: 33.8 G/DL (ref 31.4–37.4)
MCV RBC AUTO: 93 FL (ref 82–98)
MONOCYTES # BLD AUTO: 0.43 THOUSAND/ÂΜL (ref 0.17–1.22)
MONOCYTES NFR BLD AUTO: 7 % (ref 4–12)
NEUTROPHILS # BLD AUTO: 3.81 THOUSANDS/ÂΜL (ref 1.85–7.62)
NEUTS SEG NFR BLD AUTO: 60 % (ref 43–75)
NONHDLC SERPL-MCNC: 137 MG/DL
NRBC BLD AUTO-RTO: 0 /100 WBCS
PLATELET # BLD AUTO: 185 THOUSANDS/UL (ref 149–390)
PMV BLD AUTO: 9.4 FL (ref 8.9–12.7)
POTASSIUM SERPL-SCNC: 4.3 MMOL/L (ref 3.5–5.3)
PROT SERPL-MCNC: 6.1 G/DL (ref 6.4–8.4)
RBC # BLD AUTO: 3.96 MILLION/UL (ref 3.88–5.62)
SODIUM SERPL-SCNC: 139 MMOL/L (ref 135–147)
TRIGL SERPL-MCNC: 136 MG/DL
WBC # BLD AUTO: 6.24 THOUSAND/UL (ref 4.31–10.16)

## 2024-02-08 PROCEDURE — 80053 COMPREHEN METABOLIC PANEL: CPT

## 2024-02-08 PROCEDURE — 80061 LIPID PANEL: CPT

## 2024-02-08 PROCEDURE — 85025 COMPLETE CBC W/AUTO DIFF WBC: CPT

## 2024-02-08 PROCEDURE — 36415 COLL VENOUS BLD VENIPUNCTURE: CPT

## 2024-02-16 ENCOUNTER — TELEPHONE (OUTPATIENT)
Dept: FAMILY MEDICINE CLINIC | Facility: CLINIC | Age: 69
End: 2024-02-16

## 2024-02-16 DIAGNOSIS — N52.9 ERECTILE DYSFUNCTION, UNSPECIFIED ERECTILE DYSFUNCTION TYPE: Primary | ICD-10-CM

## 2024-02-16 RX ORDER — SILDENAFIL 50 MG/1
50 TABLET, FILM COATED ORAL DAILY PRN
Qty: 10 TABLET | Refills: 3 | Status: SHIPPED | OUTPATIENT
Start: 2024-02-16

## 2024-02-16 NOTE — TELEPHONE ENCOUNTER
Telephone call from patient that he would like to have his Sildenafil refilled. He is cancelled out on his active med list and he would like for it to be sent to   [id number] - [place] -   [address] [place] [phone]     Please advise and call the patient if you have any questions or concerns.

## 2024-02-21 PROBLEM — J20.5 RSV BRONCHITIS: Status: RESOLVED | Noted: 2024-01-18 | Resolved: 2024-02-21

## 2024-02-26 ENCOUNTER — TELEPHONE (OUTPATIENT)
Age: 69
End: 2024-02-26

## 2024-02-26 NOTE — TELEPHONE ENCOUNTER
Pt aware but prefers Dr. Lopez to look at these personally. Some numbers are out of range so feels that may not be normal labs. Hold for Dr. Lopez

## 2024-02-27 DIAGNOSIS — I10 BENIGN ESSENTIAL HYPERTENSION: Primary | ICD-10-CM

## 2024-02-27 DIAGNOSIS — R74.8 LOW SERUM ALKALINE PHOSPHATASE: ICD-10-CM

## 2024-02-27 NOTE — TELEPHONE ENCOUNTER
Patient returned call, gave Dr Huitron results, diet instruction and take magnesium and zinc. Patient needs order for repeat labs in 3 months and requesting orders be mailed to home address

## 2024-03-15 ENCOUNTER — TELEPHONE (OUTPATIENT)
Age: 69
End: 2024-03-15

## 2024-03-21 ENCOUNTER — APPOINTMENT (OUTPATIENT)
Dept: LAB | Facility: CLINIC | Age: 69
End: 2024-03-21
Payer: MEDICARE

## 2024-03-21 DIAGNOSIS — I10 BENIGN ESSENTIAL HYPERTENSION: ICD-10-CM

## 2024-03-21 DIAGNOSIS — R74.8 LOW SERUM ALKALINE PHOSPHATASE: ICD-10-CM

## 2024-03-21 LAB
ALBUMIN SERPL BCP-MCNC: 4.3 G/DL (ref 3.5–5)
ALP SERPL-CCNC: 40 U/L (ref 34–104)
ALT SERPL W P-5'-P-CCNC: 31 U/L (ref 7–52)
ANION GAP SERPL CALCULATED.3IONS-SCNC: 9 MMOL/L (ref 4–13)
AST SERPL W P-5'-P-CCNC: 18 U/L (ref 13–39)
BILIRUB SERPL-MCNC: 0.66 MG/DL (ref 0.2–1)
BUN SERPL-MCNC: 18 MG/DL (ref 5–25)
CALCIUM SERPL-MCNC: 9.5 MG/DL (ref 8.4–10.2)
CHLORIDE SERPL-SCNC: 100 MMOL/L (ref 96–108)
CO2 SERPL-SCNC: 26 MMOL/L (ref 21–32)
CREAT SERPL-MCNC: 0.9 MG/DL (ref 0.6–1.3)
GFR SERPL CREATININE-BSD FRML MDRD: 86 ML/MIN/1.73SQ M
GLUCOSE SERPL-MCNC: 114 MG/DL (ref 65–140)
POTASSIUM SERPL-SCNC: 3.9 MMOL/L (ref 3.5–5.3)
PROT SERPL-MCNC: 6.5 G/DL (ref 6.4–8.4)
SODIUM SERPL-SCNC: 135 MMOL/L (ref 135–147)

## 2024-03-21 PROCEDURE — 36415 COLL VENOUS BLD VENIPUNCTURE: CPT

## 2024-03-21 PROCEDURE — 80053 COMPREHEN METABOLIC PANEL: CPT

## 2024-04-03 ENCOUNTER — TELEPHONE (OUTPATIENT)
Age: 69
End: 2024-04-03

## 2024-04-03 DIAGNOSIS — N40.0 BENIGN PROSTATIC HYPERPLASIA, UNSPECIFIED WHETHER LOWER URINARY TRACT SYMPTOMS PRESENT: ICD-10-CM

## 2024-04-03 NOTE — TELEPHONE ENCOUNTER
Patient called the RX Refill Line. Message is being forwarded to the office.     Patient is requesting  he needs a new script  for tamsulosin sent over [facility], and a 14 day holdover supply sent to [facility]. I did not queue up the medication because he reports that he is taking it 3 times daily so he would need 270 capsules for a 90 day and 42 for his holdover supply.    [facility] and [id number]    Please contact patient at

## 2024-04-04 NOTE — TELEPHONE ENCOUNTER
Pt said it is 3 times a day. I explained to him that last refill was #180 but he said he definitely takes 3 and has done so for awhile.

## 2024-04-05 DIAGNOSIS — N40.0 BENIGN PROSTATIC HYPERPLASIA, UNSPECIFIED WHETHER LOWER URINARY TRACT SYMPTOMS PRESENT: ICD-10-CM

## 2024-04-05 RX ORDER — TAMSULOSIN HYDROCHLORIDE 0.4 MG/1
1.2 CAPSULE ORAL
Qty: 270 CAPSULE | Refills: 1 | Status: SHIPPED | OUTPATIENT
Start: 2024-04-05 | End: 2024-04-09 | Stop reason: SDUPTHER

## 2024-04-09 DIAGNOSIS — N40.0 BENIGN PROSTATIC HYPERPLASIA, UNSPECIFIED WHETHER LOWER URINARY TRACT SYMPTOMS PRESENT: ICD-10-CM

## 2024-04-09 RX ORDER — TAMSULOSIN HYDROCHLORIDE 0.4 MG/1
1.2 CAPSULE ORAL
Qty: 270 CAPSULE | Refills: 1 | Status: SHIPPED | OUTPATIENT
Start: 2024-04-09

## 2024-04-09 NOTE — TELEPHONE ENCOUNTER
Patient wanted this medication to go to [facility] pharmacy. He is out of medication.    Reason for call:   [x] Refill   [] Prior Auth  [] Other:     Office:   [x] PCP/Provider -   [] Specialty/Provider -     Medication: tamsulosin (FLOMAX) 0.4 mg     Dose/Frequency: Take 3 capsules (1.2 mg total) by mouth daily with dinner     Quantity: 270    Pharmacy: [id number] [address] [phone]     Does the patient have enough for 3 days?   [] Yes   [x] No - Send as HP to POD

## 2024-04-17 ENCOUNTER — RA CDI HCC (OUTPATIENT)
Dept: OTHER | Facility: HOSPITAL | Age: 69
End: 2024-04-17

## 2024-04-17 NOTE — PROGRESS NOTES
E66.01    HCC coding opportunities          Chart Reviewed number of suggestions sent to Provider: 1     Patients Insurance     Medicare Insurance: Medicare

## 2024-04-24 ENCOUNTER — TRANSCRIBE ORDERS (OUTPATIENT)
Dept: LAB | Facility: CLINIC | Age: 69
End: 2024-04-24

## 2024-04-24 ENCOUNTER — APPOINTMENT (OUTPATIENT)
Dept: LAB | Facility: CLINIC | Age: 69
End: 2024-04-24
Payer: MEDICARE

## 2024-04-24 ENCOUNTER — CONSULT (OUTPATIENT)
Dept: FAMILY MEDICINE CLINIC | Facility: CLINIC | Age: 69
End: 2024-04-24
Payer: MEDICARE

## 2024-04-24 VITALS
TEMPERATURE: 97.3 F | SYSTOLIC BLOOD PRESSURE: 122 MMHG | DIASTOLIC BLOOD PRESSURE: 80 MMHG | BODY MASS INDEX: 36.73 KG/M2 | WEIGHT: 286.2 LBS | OXYGEN SATURATION: 94 % | HEART RATE: 99 BPM | HEIGHT: 74 IN

## 2024-04-24 DIAGNOSIS — Z01.818 OTHER SPECIFIED PRE-OPERATIVE EXAMINATION: ICD-10-CM

## 2024-04-24 DIAGNOSIS — Z01.812 PRE-OPERATIVE LABORATORY EXAMINATION: Primary | ICD-10-CM

## 2024-04-24 DIAGNOSIS — Z01.818 PREOP EXAMINATION: Primary | ICD-10-CM

## 2024-04-24 DIAGNOSIS — I10 BENIGN ESSENTIAL HYPERTENSION: ICD-10-CM

## 2024-04-24 DIAGNOSIS — Z01.812 PRE-OPERATIVE LABORATORY EXAMINATION: ICD-10-CM

## 2024-04-24 PROBLEM — J96.00 ACUTE RESPIRATORY FAILURE (HCC): Status: RESOLVED | Noted: 2024-01-18 | Resolved: 2024-04-24

## 2024-04-24 LAB
BASOPHILS # BLD AUTO: 0.05 THOUSANDS/ÂΜL (ref 0–0.1)
BASOPHILS NFR BLD AUTO: 1 % (ref 0–1)
BILIRUB UR QL STRIP: NEGATIVE
CLARITY UR: CLEAR
COLOR UR: NORMAL
EOSINOPHIL # BLD AUTO: 0.73 THOUSAND/ÂΜL (ref 0–0.61)
EOSINOPHIL NFR BLD AUTO: 10 % (ref 0–6)
ERYTHROCYTE [DISTWIDTH] IN BLOOD BY AUTOMATED COUNT: 12.8 % (ref 11.6–15.1)
GLUCOSE UR STRIP-MCNC: NEGATIVE MG/DL
HCT VFR BLD AUTO: 39.5 % (ref 36.5–49.3)
HGB BLD-MCNC: 13.4 G/DL (ref 12–17)
HGB UR QL STRIP.AUTO: NEGATIVE
IMM GRANULOCYTES # BLD AUTO: 0.03 THOUSAND/UL (ref 0–0.2)
IMM GRANULOCYTES NFR BLD AUTO: 0 % (ref 0–2)
KETONES UR STRIP-MCNC: NEGATIVE MG/DL
LEUKOCYTE ESTERASE UR QL STRIP: NEGATIVE
LYMPHOCYTES # BLD AUTO: 1.69 THOUSANDS/ÂΜL (ref 0.6–4.47)
LYMPHOCYTES NFR BLD AUTO: 23 % (ref 14–44)
MCH RBC QN AUTO: 31 PG (ref 26.8–34.3)
MCHC RBC AUTO-ENTMCNC: 33.9 G/DL (ref 31.4–37.4)
MCV RBC AUTO: 91 FL (ref 82–98)
MONOCYTES # BLD AUTO: 0.5 THOUSAND/ÂΜL (ref 0.17–1.22)
MONOCYTES NFR BLD AUTO: 7 % (ref 4–12)
NEUTROPHILS # BLD AUTO: 4.31 THOUSANDS/ÂΜL (ref 1.85–7.62)
NEUTS SEG NFR BLD AUTO: 59 % (ref 43–75)
NITRITE UR QL STRIP: NEGATIVE
NRBC BLD AUTO-RTO: 0 /100 WBCS
PH UR STRIP.AUTO: 6.5 [PH]
PLATELET # BLD AUTO: 211 THOUSANDS/UL (ref 149–390)
PMV BLD AUTO: 9.9 FL (ref 8.9–12.7)
PROT UR STRIP-MCNC: NEGATIVE MG/DL
RBC # BLD AUTO: 4.32 MILLION/UL (ref 3.88–5.62)
SP GR UR STRIP.AUTO: 1.02 (ref 1–1.03)
UROBILINOGEN UR STRIP-ACNC: <2 MG/DL
WBC # BLD AUTO: 7.31 THOUSAND/UL (ref 4.31–10.16)

## 2024-04-24 PROCEDURE — 93000 ELECTROCARDIOGRAM COMPLETE: CPT | Performed by: FAMILY MEDICINE

## 2024-04-24 PROCEDURE — 81003 URINALYSIS AUTO W/O SCOPE: CPT

## 2024-04-24 PROCEDURE — 80053 COMPREHEN METABOLIC PANEL: CPT

## 2024-04-24 PROCEDURE — 85025 COMPLETE CBC W/AUTO DIFF WBC: CPT

## 2024-04-24 PROCEDURE — 36415 COLL VENOUS BLD VENIPUNCTURE: CPT

## 2024-04-24 PROCEDURE — 99214 OFFICE O/P EST MOD 30 MIN: CPT | Performed by: FAMILY MEDICINE

## 2024-04-24 NOTE — PROGRESS NOTES
PRE-OPERATIVE EVALUATION  [facility] - [facility]    NAME: Markie Barrios  AGE: 68 y.o. SEX: male  : 1955     DATE: 2024    Pre-Operative Evaluation:     Chief Complaint: Pre-operative Evaluation     Surgery: R TKR  Anticipated Date of Surgery: 24  Referring Provider: Dr Yossi Miller      History of Present Illness:     Markie Barrios is a 68 y.o. male who presents to the office today for a preoperative consultation at the request of surgeon, Dr Aguila, who plans on performing R TKR on 24. Planned anesthesia is  undetermined . Patient has a bleeding risk of:  none . Patient does not have objections to receiving blood products if needed. Current anti-platelet/anti-coagulation medications that the patient is prescribed includes:  none .      Assessment of Chronic Conditions:   - Hypertension:       Assessment of Cardiac Risk:  Denies unstable or severe angina or MI in the last 6 weeks or history of stent placement in the last year   Denies decompensated heart failure (e.g. New onset heart failure, NYHA functional class IV heart failure, or worsening existing heart failure)  Denies significant arrhythmias such as high grade AV block, symptomatic ventricular arrhythmia, newly recognized ventricular tachycardia, supraventricular tachycardia with resting heart rate >100, or symptomatic bradycardia  Denies severe heart valve disease including aortic stenosis or symptomatic mitral stenosis     Exercise Capacity:  Able to walk 4 blocks without symptoms?: Yes  Able to walk 2 flights without symptoms?: Yes    Prior Anesthesia Reactions: No     Personal history of venous thromboembolic disease? No    History of steroid use for >2 weeks within last year? No    STOP-BANG Sleep Apnea Screening Questionnaire:      Do you SNORE loudly (louder than talking or loud enough to be heard through closed doors)? Yes = 1 point   Do you often feel TIRED, fatigued, or sleepy  during daytime? No = 0 point   Has anyone OBSERVED you stop breathing during your sleep? No = 0 point   Do you have or are you being treated for high blood pressure? Yes = 1 point   BMI more than 35 kg/m2? Yes = 1 point   AGE over 50 years old? Yes = 1 point   NECK circumference > 16 inches (40 cm)? Yes = 1 point   Male GENDER? Yes = 1 point   TOTAL SCORE 6 = HIGH risk of LE       Review of Systems:     Review of Systems   Constitutional: Negative.    HENT: Negative.     Eyes: Negative.    Respiratory: Negative.     Cardiovascular: Negative.    Gastrointestinal: Negative.    Endocrine: Negative.    Genitourinary: Negative.    Musculoskeletal:  Positive for arthralgias, gait problem and joint swelling. Negative for back pain and myalgias.   Skin: Negative.    Allergic/Immunologic: Negative.    Hematological: Negative.    Psychiatric/Behavioral: Negative.          Problem List:     Patient Active Problem List   Diagnosis   • Abnormal electromyogram   • Benign essential hypertension   • BPH (benign prostatic hyperplasia)   • DDD (degenerative disc disease), lumbosacral   • Diastasis recti   • Impaired fasting glucose   • Other male erectile dysfunction   • Low HDL (under 40)   • Snoring   • Spinal stenosis of lumbar region   • Bilateral primary osteoarthritis of knee   • Hypovolemia   • Hyponatremia        Allergies:     No Known Allergies     Current Medications:       Current Outpatient Medications:   •  enalapril (VASOTEC) 20 mg tablet, TAKE 1 TABLET TWICE A DAY, Disp: 180 tablet, Rfl: 1  •  hydrochlorothiazide (HYDRODIURIL) 25 mg tablet, TAKE 1 TABLET DAILY, Disp: 90 tablet, Rfl: 1  •  sildenafil (VIAGRA) 50 MG tablet, Take 1 tablet (50 mg total) by mouth daily as needed for erectile dysfunction, Disp: 10 tablet, Rfl: 3  •  tamsulosin (FLOMAX) 0.4 mg, Take 3 capsules (1.2 mg total) by mouth daily with dinner, Disp: 270 capsule, Rfl: 1     Past History:     Past Medical History:   Diagnosis Date   • BPH (benign  "prostatic hyperplasia)    • Dyslipidemia    • Hypertension    • Impaired fasting glucose    • Osteoarthritis of both knees         Past Surgical History:   Procedure Laterality Date   • COLONOSCOPY  11/23/2020    tubular adenoma; Dr Ross        Family History   Problem Relation Age of Onset   • Hypertension Mother    • Kidney disease Mother    • No Known Problems Sister    • No Known Problems Brother    • No Known Problems Brother    • No Known Problems Son    • No Known Problems Son    • No Known Problems Daughter         Social History     Tobacco Use   • Smoking status: Never     Passive exposure: Never   • Smokeless tobacco: Never   Vaping Use   • Vaping status: Never Used   Substance Use Topics   • Alcohol use: Not Currently     Comment: occasional   • Drug use: Never        Physical Exam:      /80 (BP Location: Left arm, Patient Position: Sitting, Cuff Size: Large)   Pulse 99   Temp (!) 97.3 °F (36.3 °C)   Ht 6' 2\" (1.88 m)   Wt 130 kg (286 lb 3.2 oz)   SpO2 94%   BMI 36.75 kg/m²     Physical Exam  Vitals reviewed.   HENT:      Head: Normocephalic.      Nose: Nose normal.      Mouth/Throat:      Mouth: Mucous membranes are moist.   Eyes:      Extraocular Movements: Extraocular movements intact.      Conjunctiva/sclera: Conjunctivae normal.      Pupils: Pupils are equal, round, and reactive to light.   Cardiovascular:      Rate and Rhythm: Normal rate and regular rhythm.      Pulses: Normal pulses.   Pulmonary:      Effort: Pulmonary effort is normal.      Breath sounds: No wheezing or rales.   Abdominal:      General: There is no distension.      Palpations: There is no mass.      Tenderness: There is no abdominal tenderness.   Musculoskeletal:         General: Swelling, tenderness and deformity present.      Cervical back: Normal range of motion. No tenderness.      Right lower leg: No edema.      Left lower leg: No edema.   Lymphadenopathy:      Cervical: No cervical adenopathy. "   Neurological:      Mental Status: He is alert.           Data:     Pre-operative work-up    Laboratory Results:  labs pending    EKG: I have personally reviewed pertinent reports.  NSR with L axis deviation    Chest x-ray:  n/a    Previous cardiopulmonary studies within the past year:  Echocardiogram: n/a  Cardiac Catheterization: n/a  Stress Test: n/a  Pulmonary Function Testing: n/a       Assessment:     1. Preop examination  POCT ECG      2. Benign essential hypertension  POCT ECG           Plan:     68 y.o. male with planned surgery: R TKR.      Cardiac Risk Estimation: per the Revised Cardiac Risk Index (Circ. 100:1043, 1999), the patient does not have risk factors for cardiac complications putting him in: RCI RISK CLASS I (0 risk factors, risk of major cardiac compl. appr. 0.5%).    RCRI  High Risk surgery?         1 Point  CAD History:         1 Point   MI; Positive Stress Test; CP due to Mi;  Nitrate Usage to control Angina; Pathologic Q wave on EKG  CHF Active:         1 Point   Pulm Edema; Paroxysmal Nocturnal Dyspnea;  Bibasilar Rales (crackles);S3; CHF on CXR  Cerebrovascular Disease (TIA or CVA):     1 Point  DM on Insulin:        1 Point  Serum Creat >2.0 mg/dl:       1 Point          Total Points: 0     Scorin: Class I, Very Low Risk (0.4%)     1: Class II, Low risk (0.9%)     2: Class III Moderate (6.6%)     3: Class IV High (>11%)    1. Further preoperative workup as follows:   - None; no further preoperative work-up is required    2. Medication Management/Recommendations: hold enalapril for 24h prior to surgery      3. Prophylaxis for cardiac events with perioperative beta-blockers: not indicated.    4. Patient requires further consultation with: None    ANU Risk:  GFR:   eGFR   Date Value Ref Range Status   2024 87 ml/min/1.73sq m Final       - If GFR>60, Hold ACE/ARB/Diuretic on the day of surgery, and NSAIDS 10 days before.  - If GFR<45, Consider PRE and POST op Nephrology  "Consult.  - If 46 <GFR> 59 : Has Patient had ANU in last 6 Months? no   If YES: Preop Nephrology consult   If No:  Post Op Nephrology consult.    Clearance  Patient is CLEARED for surgery without any additional cardiac testing.     A copy of this evaluation was transmitted electronically or by fax to the requesting provider.     Lonnie Huitron MD  [place] [id number] [address] [address]  Phone#  [phone]  Fax#  [phone]    Please be aware that this note contains text that was dictated and there may be errors pertaining to \"sound-alike\" words during the dictation process.     "

## 2024-04-25 ENCOUNTER — TELEPHONE (OUTPATIENT)
Age: 69
End: 2024-04-25

## 2024-04-25 LAB
ALBUMIN SERPL BCP-MCNC: 4.2 G/DL (ref 3.5–5)
ALP SERPL-CCNC: 37 U/L (ref 34–104)
ALT SERPL W P-5'-P-CCNC: 27 U/L (ref 7–52)
ANION GAP SERPL CALCULATED.3IONS-SCNC: 8 MMOL/L (ref 4–13)
AST SERPL W P-5'-P-CCNC: 16 U/L (ref 13–39)
BILIRUB SERPL-MCNC: 0.47 MG/DL (ref 0.2–1)
BUN SERPL-MCNC: 24 MG/DL (ref 5–25)
CALCIUM SERPL-MCNC: 9.4 MG/DL (ref 8.4–10.2)
CHLORIDE SERPL-SCNC: 104 MMOL/L (ref 96–108)
CO2 SERPL-SCNC: 26 MMOL/L (ref 21–32)
CREAT SERPL-MCNC: 0.89 MG/DL (ref 0.6–1.3)
GFR SERPL CREATININE-BSD FRML MDRD: 87 ML/MIN/1.73SQ M
GLUCOSE P FAST SERPL-MCNC: 87 MG/DL (ref 65–99)
POTASSIUM SERPL-SCNC: 4 MMOL/L (ref 3.5–5.3)
PROT SERPL-MCNC: 6.8 G/DL (ref 6.4–8.4)
SODIUM SERPL-SCNC: 138 MMOL/L (ref 135–147)

## 2024-04-25 NOTE — TELEPHONE ENCOUNTER
Ok for placard? If so I will prep. For the post op heals, that should be ordered from the surgeon correct?

## 2024-04-25 NOTE — TELEPHONE ENCOUNTER
Prepped form, Patient will  Wednesday next week. Does not need a call back. Place at  for pickup when done

## 2024-04-25 NOTE — TELEPHONE ENCOUNTER
Pt is interested in a temporary handicap placard, he is having kneed surgery on May 21 and is looking to obtain until post op/heals and wants to know how to go about obtaining that? Please call pt to advise

## 2024-05-16 DIAGNOSIS — I10 BENIGN ESSENTIAL HYPERTENSION: ICD-10-CM

## 2024-05-17 RX ORDER — ENALAPRIL MALEATE 20 MG/1
20 TABLET ORAL 2 TIMES DAILY
Qty: 180 TABLET | Refills: 1 | Status: SHIPPED | OUTPATIENT
Start: 2024-05-17

## 2024-05-17 RX ORDER — HYDROCHLOROTHIAZIDE 25 MG/1
25 TABLET ORAL DAILY
Qty: 90 TABLET | Refills: 1 | Status: SHIPPED | OUTPATIENT
Start: 2024-05-17

## 2024-07-08 ENCOUNTER — TELEPHONE (OUTPATIENT)
Dept: FAMILY MEDICINE CLINIC | Facility: CLINIC | Age: 69
End: 2024-07-08

## 2024-07-08 NOTE — TELEPHONE ENCOUNTER
Pt has a 6M f/u with you on 8/15. Please advise if you would like him to obtain any labs prior to visit

## 2024-07-10 DIAGNOSIS — I10 BENIGN ESSENTIAL HYPERTENSION: ICD-10-CM

## 2024-07-10 DIAGNOSIS — Z12.5 SCREENING FOR PROSTATE CANCER: Primary | ICD-10-CM

## 2024-08-15 ENCOUNTER — OFFICE VISIT (OUTPATIENT)
Dept: FAMILY MEDICINE CLINIC | Facility: CLINIC | Age: 69
End: 2024-08-15
Payer: MEDICARE

## 2024-08-15 VITALS
BODY MASS INDEX: 36.99 KG/M2 | SYSTOLIC BLOOD PRESSURE: 118 MMHG | DIASTOLIC BLOOD PRESSURE: 80 MMHG | TEMPERATURE: 97.2 F | OXYGEN SATURATION: 96 % | HEIGHT: 74 IN | HEART RATE: 89 BPM | WEIGHT: 288.2 LBS

## 2024-08-15 DIAGNOSIS — R35.0 BENIGN PROSTATIC HYPERPLASIA WITH URINARY FREQUENCY: ICD-10-CM

## 2024-08-15 DIAGNOSIS — I10 BENIGN ESSENTIAL HYPERTENSION: Primary | ICD-10-CM

## 2024-08-15 DIAGNOSIS — N40.1 BENIGN PROSTATIC HYPERPLASIA WITH URINARY FREQUENCY: ICD-10-CM

## 2024-08-15 PROCEDURE — 99214 OFFICE O/P EST MOD 30 MIN: CPT | Performed by: FAMILY MEDICINE

## 2024-08-15 PROCEDURE — G2211 COMPLEX E/M VISIT ADD ON: HCPCS | Performed by: FAMILY MEDICINE

## 2024-08-15 NOTE — PROGRESS NOTES
Ambulatory Visit  Name: Markie Barrios      : 1955      MRN: [id number]  Encounter Provider: Lonnie Huitron MD  Encounter Date: 8/15/2024   Encounter department: [facility]    Assessment & Plan   1. Benign essential hypertension  Assessment & Plan:  Well controlled. Cont present treatment. Monitor labs. Recheck 6m    2. Benign prostatic hyperplasia with urinary frequency  Assessment & Plan:  Poor control.  Failed Flomax. Recheck Renal US with PVR.  Refer to Urology.  Recheck 6m  Orders:  -     Ambulatory Referral to Urology; Future  -     US kidney and bladder with pvr; Future; Expected date: 08/15/2024       History of Present Illness     f/u multiple med issues   - pt doing well.  - pt is s/p R TKR on .  Pt is finishing PT and is doing well  - pt has been doing PT as above.  Pt denies CP, palpitations, lightheadedness or other CV symptoms with or without exertion  - pt denies any new GI issues.  Due for repeat colonoscopy in   - pt still with urinary frequency and nocturia. Seems to be worse at night or if he is moving around (not as bad when he is sitting).  Has not seen Uro.  No hesitancy, some urgency, (+) weak stream, and no post void dribbling. Pt on Flomax 1.2mg  qd.   - I reviewed recent labs with pt. Chol acceptable. Fasting .         Review of Systems   Constitutional: Negative.    HENT: Negative.     Eyes: Negative.    Respiratory: Negative.     Cardiovascular: Negative.    Gastrointestinal: Negative.    Endocrine: Negative.    Genitourinary:  Positive for frequency. Negative for dysuria and hematuria.   Musculoskeletal:  Positive for arthralgias (R knee improved s/p TKR), gait problem and joint swelling. Negative for back pain and myalgias.   Skin: Negative.    Allergic/Immunologic: Negative.    Neurological:  Negative for dizziness, light-headedness and headaches.   Hematological: Negative.    Psychiatric/Behavioral: Negative.       Past  Medical History:   Diagnosis Date   • BPH (benign prostatic hyperplasia)    • Dyslipidemia    • Hypertension    • Impaired fasting glucose    • Osteoarthritis of both knees      Past Surgical History:   Procedure Laterality Date   • COLONOSCOPY  11/23/2020    tubular adenoma; Dr Ross     Family History   Problem Relation Age of Onset   • Hypertension Mother    • Kidney disease Mother    • No Known Problems Sister    • No Known Problems Brother    • No Known Problems Brother    • No Known Problems Son    • No Known Problems Son    • No Known Problems Daughter      Social History     Tobacco Use   • Smoking status: Never     Passive exposure: Never   • Smokeless tobacco: Never   Vaping Use   • Vaping status: Never Used   Substance and Sexual Activity   • Alcohol use: Not Currently     Comment: occasional   • Drug use: Never   • Sexual activity: Not Currently     Current Outpatient Medications on File Prior to Visit   Medication Sig   • enalapril (VASOTEC) 20 mg tablet Take 1 tablet (20 mg total) by mouth 2 (two) times a day   • hydroCHLOROthiazide 25 mg tablet Take 1 tablet (25 mg total) by mouth daily   • sildenafil (VIAGRA) 50 MG tablet Take 1 tablet (50 mg total) by mouth daily as needed for erectile dysfunction   • tamsulosin (FLOMAX) 0.4 mg Take 3 capsules (1.2 mg total) by mouth daily with dinner     No Known Allergies  Immunization History   Administered Date(s) Administered   • COVID-19 MODERNA VACC 0.5 ML IM 03/09/2021, 04/06/2021, 05/23/2022   • COVID-19 Moderna mRNA Vaccine 12 Yr+ 50 mcg/0.5 mL (Spikevax) 10/25/2023   • Fluzone Split Quad 0.5 mL 12/03/2015, 10/12/2017   • INFLUENZA 12/03/2015, 10/12/2017, 10/25/2023   • Influenza, high dose seasonal 0.7 mL 10/28/2020, 10/07/2021, 12/28/2022   • Pneumococcal Polysaccharide PPV23 02/17/2020   • Tdap 11/20/2014   • Zoster 11/20/2014   • Zoster Vaccine Recombinant 11/01/2021, 01/10/2022     Objective     /80 (BP Location: Left arm, Patient Position:  "Sitting, Cuff Size: Large)   Pulse 89   Temp (!) 97.2 °F (36.2 °C)   Ht 6' 2\" (1.88 m)   Wt 131 kg (288 lb 3.2 oz)   SpO2 96%   BMI 37.00 kg/m²     Physical Exam  Vitals reviewed.   Constitutional:       Appearance: He is well-developed.   HENT:      Head: Normocephalic and atraumatic.      Right Ear: Tympanic membrane, ear canal and external ear normal.      Left Ear: Tympanic membrane, ear canal and external ear normal.      Nose: Nose normal.      Mouth/Throat:      Mouth: Mucous membranes are moist.   Eyes:      Extraocular Movements: Extraocular movements intact.      Conjunctiva/sclera: Conjunctivae normal.      Pupils: Pupils are equal, round, and reactive to light.   Neck:      Thyroid: No thyromegaly.      Vascular: No JVD.   Cardiovascular:      Rate and Rhythm: Normal rate and regular rhythm.      Pulses: Normal pulses.      Heart sounds: Normal heart sounds.   Pulmonary:      Effort: Pulmonary effort is normal.      Breath sounds: Normal breath sounds.   Abdominal:      General: Bowel sounds are normal. There is no distension.      Palpations: Abdomen is soft. There is no mass.      Tenderness: There is no abdominal tenderness. There is no right CVA tenderness or left CVA tenderness.      Comments: Diastasis recti with ?umbilical hernia?   Musculoskeletal:         General: Deformity (R knee is s/p TKR) present. No swelling or tenderness. Normal range of motion.      Cervical back: Normal range of motion and neck supple. No tenderness. No muscular tenderness.      Right lower leg: Edema (trace) present.      Left lower leg: Edema (trace) present.   Lymphadenopathy:      Cervical: No cervical adenopathy.   Skin:     General: Skin is warm.      Capillary Refill: Capillary refill takes less than 2 seconds.   Neurological:      Mental Status: He is alert and oriented to person, place, and time.      Cranial Nerves: No cranial nerve deficit.      Sensory: No sensory deficit.      Motor: No weakness or " abnormal muscle tone.      Gait: Gait normal.   Psychiatric:         Mood and Affect: Mood normal.         Behavior: Behavior normal.         Thought Content: Thought content normal.         Judgment: Judgment normal.      Comments: PHQ-2/9 Depression Screening    Little interest or pleasure in doing things: 0 - not at all  Feeling down, depressed, or hopeless: 0 - not at all  PHQ-2 Score: 0  PHQ-2 Interpretation: Negative depression screen

## 2024-08-28 ENCOUNTER — HOSPITAL ENCOUNTER (OUTPATIENT)
Dept: RADIOLOGY | Facility: IMAGING CENTER | Age: 69
Discharge: HOME | End: 2024-08-28
Payer: MEDICARE

## 2024-08-28 ENCOUNTER — APPOINTMENT (OUTPATIENT)
Dept: LAB | Facility: CLINIC | Age: 69
End: 2024-08-28
Payer: MEDICARE

## 2024-08-28 DIAGNOSIS — N40.1 BENIGN PROSTATIC HYPERPLASIA WITH URINARY FREQUENCY: ICD-10-CM

## 2024-08-28 DIAGNOSIS — Z12.5 SCREENING FOR PROSTATE CANCER: ICD-10-CM

## 2024-08-28 DIAGNOSIS — R35.0 BENIGN PROSTATIC HYPERPLASIA WITH URINARY FREQUENCY: ICD-10-CM

## 2024-08-28 DIAGNOSIS — I10 BENIGN ESSENTIAL HYPERTENSION: ICD-10-CM

## 2024-08-28 LAB
ALBUMIN SERPL BCG-MCNC: 4.2 G/DL (ref 3.5–5)
ALP SERPL-CCNC: 55 U/L (ref 34–104)
ALT SERPL W P-5'-P-CCNC: 23 U/L (ref 7–52)
ANION GAP SERPL CALCULATED.3IONS-SCNC: 11 MMOL/L (ref 4–13)
AST SERPL W P-5'-P-CCNC: 15 U/L (ref 13–39)
BASOPHILS # BLD AUTO: 0.08 THOUSANDS/ÂΜL (ref 0–0.1)
BASOPHILS NFR BLD AUTO: 1 % (ref 0–1)
BILIRUB SERPL-MCNC: 0.52 MG/DL (ref 0.2–1)
BUN SERPL-MCNC: 20 MG/DL (ref 5–25)
CALCIUM SERPL-MCNC: 9.8 MG/DL (ref 8.4–10.2)
CHLORIDE SERPL-SCNC: 102 MMOL/L (ref 96–108)
CHOLEST SERPL-MCNC: 163 MG/DL
CO2 SERPL-SCNC: 26 MMOL/L (ref 21–32)
CREAT SERPL-MCNC: 1 MG/DL (ref 0.6–1.3)
EOSINOPHIL # BLD AUTO: 0.53 THOUSAND/ÂΜL (ref 0–0.61)
EOSINOPHIL NFR BLD AUTO: 9 % (ref 0–6)
ERYTHROCYTE [DISTWIDTH] IN BLOOD BY AUTOMATED COUNT: 13.4 % (ref 11.6–15.1)
GFR SERPL CREATININE-BSD FRML MDRD: 76 ML/MIN/1.73SQ M
GLUCOSE P FAST SERPL-MCNC: 101 MG/DL (ref 65–99)
HCT VFR BLD AUTO: 41 % (ref 36.5–49.3)
HDLC SERPL-MCNC: 28 MG/DL
HGB BLD-MCNC: 13.9 G/DL (ref 12–17)
IMM GRANULOCYTES # BLD AUTO: 0.02 THOUSAND/UL (ref 0–0.2)
IMM GRANULOCYTES NFR BLD AUTO: 0 % (ref 0–2)
LDLC SERPL CALC-MCNC: 103 MG/DL (ref 0–100)
LYMPHOCYTES # BLD AUTO: 1.42 THOUSANDS/ÂΜL (ref 0.6–4.47)
LYMPHOCYTES NFR BLD AUTO: 24 % (ref 14–44)
MCH RBC QN AUTO: 30.2 PG (ref 26.8–34.3)
MCHC RBC AUTO-ENTMCNC: 33.9 G/DL (ref 31.4–37.4)
MCV RBC AUTO: 89 FL (ref 82–98)
MONOCYTES # BLD AUTO: 0.49 THOUSAND/ÂΜL (ref 0.17–1.22)
MONOCYTES NFR BLD AUTO: 8 % (ref 4–12)
NEUTROPHILS # BLD AUTO: 3.34 THOUSANDS/ÂΜL (ref 1.85–7.62)
NEUTS SEG NFR BLD AUTO: 58 % (ref 43–75)
NONHDLC SERPL-MCNC: 135 MG/DL
NRBC BLD AUTO-RTO: 0 /100 WBCS
PLATELET # BLD AUTO: 241 THOUSANDS/UL (ref 149–390)
PMV BLD AUTO: 10 FL (ref 8.9–12.7)
POTASSIUM SERPL-SCNC: 4.1 MMOL/L (ref 3.5–5.3)
PROT SERPL-MCNC: 6.8 G/DL (ref 6.4–8.4)
PSA SERPL-MCNC: 1.03 NG/ML (ref 0–4)
RBC # BLD AUTO: 4.61 MILLION/UL (ref 3.88–5.62)
SODIUM SERPL-SCNC: 139 MMOL/L (ref 135–147)
TRIGL SERPL-MCNC: 162 MG/DL
WBC # BLD AUTO: 5.88 THOUSAND/UL (ref 4.31–10.16)

## 2024-08-28 PROCEDURE — 85025 COMPLETE CBC W/AUTO DIFF WBC: CPT

## 2024-08-28 PROCEDURE — 36415 COLL VENOUS BLD VENIPUNCTURE: CPT

## 2024-08-28 PROCEDURE — 80053 COMPREHEN METABOLIC PANEL: CPT

## 2024-08-28 PROCEDURE — 76770 US EXAM ABDO BACK WALL COMP: CPT

## 2024-08-28 PROCEDURE — 80061 LIPID PANEL: CPT

## 2024-08-28 PROCEDURE — G0103 PSA SCREENING: HCPCS

## 2024-09-03 ENCOUNTER — TELEPHONE (OUTPATIENT)
Dept: FAMILY MEDICINE CLINIC | Facility: CLINIC | Age: 69
End: 2024-09-03

## 2024-09-03 NOTE — TELEPHONE ENCOUNTER
Pt returned call. Pt states that he is having the same urinary symptoms as before. Would like a referral for urology.

## 2024-09-03 NOTE — TELEPHONE ENCOUNTER
Pt aware and is asking if he needs any further imaging for the findings or if anything needs to be done. Also asking if you feel any vaccines are needed prior to winter.

## 2024-09-03 NOTE — TELEPHONE ENCOUNTER
----- Message from Lonnie Huitron MD sent at 9/3/2024  7:41 AM EDT -----  US shows a renal cyst and evidence of fatty liver, but the rest OK

## 2024-10-08 ENCOUNTER — OFFICE VISIT (OUTPATIENT)
Dept: UROLOGY | Facility: AMBULATORY SURGERY CENTER | Age: 69
End: 2024-10-08
Payer: MEDICARE

## 2024-10-08 VITALS
DIASTOLIC BLOOD PRESSURE: 74 MMHG | WEIGHT: 286 LBS | SYSTOLIC BLOOD PRESSURE: 136 MMHG | OXYGEN SATURATION: 96 % | BODY MASS INDEX: 36.7 KG/M2 | HEART RATE: 85 BPM | HEIGHT: 74 IN

## 2024-10-08 DIAGNOSIS — N40.1 BENIGN PROSTATIC HYPERPLASIA WITH URINARY FREQUENCY: Primary | ICD-10-CM

## 2024-10-08 DIAGNOSIS — E66.01 OBESITY, MORBID (HCC): ICD-10-CM

## 2024-10-08 DIAGNOSIS — R35.0 BENIGN PROSTATIC HYPERPLASIA WITH URINARY FREQUENCY: Primary | ICD-10-CM

## 2024-10-08 LAB
POST-VOID RESIDUAL VOLUME, ML POC: 64 ML
SL AMB  POCT GLUCOSE, UA: NORMAL
SL AMB LEUKOCYTE ESTERASE,UA: NORMAL
SL AMB POCT BILIRUBIN,UA: NORMAL
SL AMB POCT BLOOD,UA: NORMAL
SL AMB POCT CLARITY,UA: CLEAR
SL AMB POCT COLOR,UA: YELLOW
SL AMB POCT KETONES,UA: NORMAL
SL AMB POCT NITRITE,UA: NORMAL
SL AMB POCT PH,UA: 6.5
SL AMB POCT SPECIFIC GRAVITY,UA: 1.01
SL AMB POCT URINE PROTEIN: NORMAL
SL AMB POCT UROBILINOGEN: NORMAL

## 2024-10-08 PROCEDURE — 81002 URINALYSIS NONAUTO W/O SCOPE: CPT

## 2024-10-08 PROCEDURE — 99204 OFFICE O/P NEW MOD 45 MIN: CPT

## 2024-10-08 PROCEDURE — 51798 US URINE CAPACITY MEASURE: CPT

## 2024-10-08 RX ORDER — FINASTERIDE 5 MG/1
5 TABLET, FILM COATED ORAL DAILY
Qty: 30 TABLET | Refills: 3 | Status: SHIPPED | OUTPATIENT
Start: 2024-10-08 | End: 2025-02-05

## 2024-10-08 NOTE — ASSESSMENT & PLAN NOTE
Patient currently taking Flomax 1.2 mg daily at nighttime not on a full stomach  PVR performed the office today found to be 64 mL  Patient reports an intermittent weakened urinary stream, urinary frequency, nocturia x 3, and feelings of incomplete bladder emptying  We discussed dietary modifications as well as restricting fluid intake up to 2 to 3 hours prior to bedtime.  We discussed that the patient can decrease his dose to Flomax 0.8 mg daily with 1 in the morning and 1 day at night for better full day coverage and should be taken on a full stomach.  Additionally, we discussed that the patient should initiate combination therapy with a 5 alpha reductase inhibitor such as finasteride.  I advised the patient that this medication can take up to 2 to 4 months to provide benefit.  We discussed that with the patient's symptoms continue to persist despite pharmacotherapy, that he should consider undergoing a cystoscopy in the office.  We discussed what a cystoscopy would entail.  However, the patient defers scheduling cystoscopy at this time and would be more open to pursuing 1 if his symptoms persist despite combination therapy.  Patient will initiate combination treatment with Flomax 0.8 mg and finasteride 5 mg daily and follow-up in the office in 3 months to reevaluate lower urinary tract symptoms.  Patient should undergo AUA symptom score and PVR at follow-up visit.  
Alert and oriented, no focal deficits, no motor or sensory deficits.

## 2024-10-08 NOTE — PROGRESS NOTES
10/8/2024      Assessment and Plan    68 y.o. male new patient to [facility] for urology    BPH (benign prostatic hyperplasia)  Patient currently taking Flomax 1.2 mg daily at nighttime not on a full stomach  PVR performed the office today found to be 64 mL  Patient reports an intermittent weakened urinary stream, urinary frequency, nocturia x 3, and feelings of incomplete bladder emptying  We discussed dietary modifications as well as restricting fluid intake up to 2 to 3 hours prior to bedtime.  We discussed that the patient can decrease his dose to Flomax 0.8 mg daily with 1 in the morning and 1 day at night for better full day coverage and should be taken on a full stomach.  Additionally, we discussed that the patient should initiate combination therapy with a 5 alpha reductase inhibitor such as finasteride.  I advised the patient that this medication can take up to 2 to 4 months to provide benefit.  We discussed that with the patient's symptoms continue to persist despite pharmacotherapy, that he should consider undergoing a cystoscopy in the office.  We discussed what a cystoscopy would entail.  However, the patient defers scheduling cystoscopy at this time and would be more open to pursuing 1 if his symptoms persist despite combination therapy.  Patient will initiate combination treatment with Flomax 0.8 mg and finasteride 5 mg daily and follow-up in the office in 3 months to reevaluate lower urinary tract symptoms.  Patient should undergo AUA symptom score and PVR at follow-up visit.        History of Present Illness  Markie Barrios is a 68 y.o. male here for evaluation of BPH with urinary frequency.  Patient was referred to our practice by his PCP after being initiated on Flomax 1.2 mg mg daily and having persistent lower urinary tract symptoms.  Additionally, the patient is on Viagra 50 mg as needed 1 hour prior to sexual activity.  Patient underwent ultrasound of the kidney and bladder on  "8/28/2024 which noted multiple simple cyst on the left kidney with the largest measuring 3.0 x 3.2 x 2.7 cm.  Patient's measured PVR at that time was 7.1.  However, patient's PVR in the office today was found to be 64 mL.  Patient has a significant past medical history for hypertension, obesity, and degenerative disc disease.    Today, the patient reports that he has been taking 1.2 mg of Flomax at night without dinner daily.  Patient notes that he continues to have a intermittent weakened urinary stream and on most occasions nocturia x 3, but notes nocturia upwards of 4-5 times.  Patient reports feelings of incomplete bladder emptying.  Patient denies dysuria, hematuria, flank pain, urinary incontinence, or worsening urinary urgency.        Review of Systems   Constitutional:  Negative for chills and fever.   HENT:  Negative for ear pain and sore throat.    Eyes:  Negative for pain and visual disturbance.   Respiratory:  Negative for cough and shortness of breath.    Cardiovascular:  Negative for chest pain and palpitations.   Gastrointestinal:  Negative for abdominal pain and vomiting.   Genitourinary:  Positive for difficulty urinating and frequency. Negative for decreased urine volume, dysuria, flank pain, hematuria and urgency.   Musculoskeletal:  Negative for arthralgias and back pain.   Skin:  Negative for color change and rash.   Neurological:  Negative for seizures and syncope.   All other systems reviewed and are negative.               Vitals  Vitals:    10/08/24 0853   BP: 136/74   BP Location: Left arm   Patient Position: Sitting   Cuff Size: Standard   Pulse: 85   SpO2: 96%   Weight: 130 kg (286 lb)   Height: 6' 2\" (1.88 m)       Physical Exam  Vitals reviewed.   Constitutional:       General: He is not in acute distress.     Appearance: Normal appearance. He is not ill-appearing.   HENT:      Head: Normocephalic and atraumatic.      Nose: Nose normal.   Eyes:      General: No scleral " icterus.  Pulmonary:      Effort: No respiratory distress.   Abdominal:      General: Abdomen is flat. There is no distension.      Palpations: Abdomen is soft.      Tenderness: There is no abdominal tenderness.   Genitourinary:     Comments: Normal phallus, testes descended bilaterally and without nodularity.  OTTO performed today.  Prostate estimated to be about 50 g and smooth bilaterally without nodularity or induration.  Musculoskeletal:         General: Normal range of motion.      Cervical back: Normal range of motion.   Skin:     General: Skin is warm.      Coloration: Skin is not jaundiced.   Neurological:      Mental Status: He is alert and oriented to person, place, and time.      Gait: Gait normal.   Psychiatric:         Mood and Affect: Mood normal.         Behavior: Behavior normal.           Past History  Past Medical History:   Diagnosis Date    BPH (benign prostatic hyperplasia)     Dyslipidemia     Hypertension     Impaired fasting glucose     Osteoarthritis of both knees      Social History     Socioeconomic History    Marital status:      Spouse name: None    Number of children: None    Years of education: None    Highest education level: None   Occupational History    Occupation: retired   Tobacco Use    Smoking status: Never     Passive exposure: Never    Smokeless tobacco: Never   Vaping Use    Vaping status: Never Used   Substance and Sexual Activity    Alcohol use: Not Currently     Comment: occasional    Drug use: Never    Sexual activity: Not Currently   Other Topics Concern    None   Social History Narrative    None     Social Determinants of Health     Financial Resource Strain: Low Risk  (5/21/2024)    Received from [facility]    Overall Financial Resource Strain (CARDIA)     Difficulty of Paying Living Expenses: Not hard at all   Food Insecurity: No Food Insecurity (5/21/2024)    Received from [facility]    Hunger Vital Sign     Worried About  Running Out of Food in the Last Year: Never true     Ran Out of Food in the Last Year: Never true   Transportation Needs: No Transportation Needs (5/21/2024)    Received from [facility]    PRAPARE - Transportation     Lack of Transportation (Medical): No     Lack of Transportation (Non-Medical): No   Physical Activity: Not on file   Stress: Not on file   Social Connections: Not on file   Intimate Partner Violence: Not At Risk (5/21/2024)    Received from [facility]    Humiliation, Afraid, Rape, and Kick questionnaire     Fear of Current or Ex-Partner: No     Emotionally Abused: No     Physically Abused: No     Sexually Abused: No   Housing Stability: Unknown (5/21/2024)    Received from [facility]    Housing Stability Vital Sign     Unable to Pay for Housing in the Last Year: Patient unable to answer     Number of Times Moved in the Last Year: 1     Homeless in the Last Year: No     Social History     Tobacco Use   Smoking Status Never    Passive exposure: Never   Smokeless Tobacco Never     Family History   Problem Relation Age of Onset    Hypertension Mother     Kidney disease Mother     No Known Problems Sister     No Known Problems Brother     No Known Problems Brother     No Known Problems Son     No Known Problems Son     No Known Problems Daughter        The following portions of the patient's history were reviewed and updated as appropriate: allergies, current medications, past medical history, past social history, past surgical history and problem list.    Results  No results found for this or any previous visit (from the past 1 hour(s)).  ]  Lab Results   Component Value Date    PSA 1.030 08/28/2024    PSA 0.92 06/15/2023    PSA 0.7 09/08/2021     Lab Results   Component Value Date    CALCIUM 9.8 08/28/2024    K 4.1 08/28/2024    CO2 26 08/28/2024     08/28/2024    BUN 20 08/28/2024    CREATININE 1.00 08/28/2024     Lab Results   Component Value  Date    WBC 5.88 08/28/2024    HGB 13.9 08/28/2024    HCT 41.0 08/28/2024    MCV 89 08/28/2024     08/28/2024

## 2024-10-17 ENCOUNTER — TELEPHONE (OUTPATIENT)
Age: 69
End: 2024-10-17

## 2024-10-17 NOTE — TELEPHONE ENCOUNTER
Patient called regarding the RSV vaccine and should he receive the vaccine this year.Please advise.

## 2024-11-13 DIAGNOSIS — I10 BENIGN ESSENTIAL HYPERTENSION: ICD-10-CM

## 2024-11-13 RX ORDER — ENALAPRIL MALEATE 20 MG/1
20 TABLET ORAL 2 TIMES DAILY
Qty: 180 TABLET | Refills: 1 | Status: SHIPPED | OUTPATIENT
Start: 2024-11-13

## 2024-11-13 RX ORDER — HYDROCHLOROTHIAZIDE 25 MG/1
25 TABLET ORAL DAILY
Qty: 90 TABLET | Refills: 1 | Status: SHIPPED | OUTPATIENT
Start: 2024-11-13

## 2024-12-11 DIAGNOSIS — N40.0 BENIGN PROSTATIC HYPERPLASIA, UNSPECIFIED WHETHER LOWER URINARY TRACT SYMPTOMS PRESENT: ICD-10-CM

## 2024-12-11 RX ORDER — TAMSULOSIN HYDROCHLORIDE 0.4 MG/1
CAPSULE ORAL
Qty: 270 CAPSULE | Refills: 1 | Status: SHIPPED | OUTPATIENT
Start: 2024-12-11

## 2024-12-11 NOTE — TELEPHONE ENCOUNTER
Patient called to request a refill for their  tamsulosin (FLOMAX) 0.4 mg advised a refill was requested on 12- and is pending approval. Patient verbalized understanding and is in agreement.       Patient is out of medication at this time, he was able to get two days of capsules from the pharmacy as a hold over.

## 2025-01-13 ENCOUNTER — OFFICE VISIT (OUTPATIENT)
Dept: UROLOGY | Facility: AMBULATORY SURGERY CENTER | Age: 70
End: 2025-01-13

## 2025-01-13 VITALS
HEIGHT: 74 IN | WEIGHT: 287 LBS | OXYGEN SATURATION: 98 % | SYSTOLIC BLOOD PRESSURE: 132 MMHG | BODY MASS INDEX: 36.83 KG/M2 | HEART RATE: 94 BPM | DIASTOLIC BLOOD PRESSURE: 68 MMHG

## 2025-01-13 DIAGNOSIS — E66.01 OBESITY, MORBID (HCC): ICD-10-CM

## 2025-01-13 DIAGNOSIS — Z12.5 SCREENING FOR PROSTATE CANCER: ICD-10-CM

## 2025-01-13 DIAGNOSIS — R35.0 BENIGN PROSTATIC HYPERPLASIA WITH URINARY FREQUENCY: Primary | ICD-10-CM

## 2025-01-13 DIAGNOSIS — N40.1 BENIGN PROSTATIC HYPERPLASIA WITH URINARY FREQUENCY: Primary | ICD-10-CM

## 2025-01-13 LAB — POST-VOID RESIDUAL VOLUME, ML POC: 29 ML

## 2025-01-13 NOTE — ASSESSMENT & PLAN NOTE
Patient currently controlled on Flomax 0.8 mg and finasteride 5 mg daily  PVR performed in the office today was found to be 29 mL  AUA symptom score 16  We discussed that with the patient content with his voiding pattern on combination therapy, that we continue on a combination of Flomax 0.8 mg and finasteride 5 mg daily.  We discussed further workup would include a cystoscopy for bladder outlet obstruction.  However, the patient defers this at this time.  We will continue to monitor for worsening/progression of lower urinary tract symptoms

## 2025-01-13 NOTE — PROGRESS NOTES
1/13/2025      Assessment and Plan    69 y.o. male managed by our office    BPH (benign prostatic hyperplasia)  Patient currently controlled on Flomax 0.8 mg and finasteride 5 mg daily  PVR performed in the office today was found to be 29 mL  AUA symptom score 16  We discussed that with the patient content with his voiding pattern on combination therapy, that we continue on a combination of Flomax 0.8 mg and finasteride 5 mg daily.  We discussed further workup would include a cystoscopy for bladder outlet obstruction.  However, the patient defers this at this time.  We will continue to monitor for worsening/progression of lower urinary tract symptoms    Screening for prostate cancer  Patient's last PSA was performed 8/28/2024 and found to be 1.030.  Refer to PSA trend below.  We discussed that since initiating therapy with finasteride, that after 6 months of utilizing this medication the patient's PSA will be halved.  We discussed moving forward to get a true value we will have to double the value by 2.  We discussed that the patient will be due for repeat PSA in 1 year from his last and that he should follow-up in the office at that time  Patient will repeat his PSA in 1 year from his last with follow-up in the office at that time.    Lab Results   Component Value Date    PSA 1.030 08/28/2024    PSA 0.92 06/15/2023    PSA 0.7 09/08/2021            History of Present Illness  Markie Barrios is a 69 y.o. male here for evaluation of BPH with urinary frequency. Patient has a significant past medical history for hypertension, obesity, and degenerative disc disease.  Patient was last seen in our office on 10/8/2024.  Patient was decreased from Flomax 1.2 mg daily to 0.8 mg daily at her last office visit.  Additionally, the patient was initiated on combination therapy with finasteride 5 mg daily. Additionally, the patient is on Viagra 50 mg as needed 1 hour prior to sexual activity. Patient underwent ultrasound of the  kidney and bladder on 8/28/2024 which noted multiple simple cyst on the left kidney with the largest measuring 3.0 x 3.2 x 2.7 cm. Patient's measured PVR at that time was 7.1.  Patient's PVR in our office today was found to be 29 mL, which is decreased from his previous 64 mL.  Today, the patient reports minimal improvement in his lower urinary tract symptoms since initiating combination therapy of Flomax 0.8 mg and finasteride 5 mg daily.  Patient notes that his voiding pattern is intermittent and some days he voids extremely well and on others he has a weaker urinary stream and goes frequently and urgently.  However, at this time the patient is currently content with his voiding pattern.  AUA symptom score 16.  Patient denies dysuria, hematuria, flank pain, feelings of incomplete bladder emptying, or urinary incontinence.      Review of Systems   Constitutional:  Negative for chills and fever.   HENT:  Negative for ear pain and sore throat.    Eyes:  Negative for pain and visual disturbance.   Respiratory:  Negative for cough and shortness of breath.    Cardiovascular:  Negative for chest pain and palpitations.   Gastrointestinal:  Negative for abdominal pain and vomiting.   Genitourinary:  Negative for decreased urine volume, difficulty urinating, dysuria, flank pain, frequency, hematuria and urgency.   Musculoskeletal:  Negative for arthralgias and back pain.   Skin:  Negative for color change and rash.   Neurological:  Negative for seizures and syncope.   All other systems reviewed and are negative.          AUA SYMPTOM SCORE      Flowsheet Row Most Recent Value   AUA SYMPTOM SCORE    How often have you had a sensation of not emptying your bladder completely after you finished urinating? 1 (P)     How often have you had to urinate again less than two hours after you finished urinating? 2 (P)     How often have you found you stopped and started again several times when you urinate? 1 (P)     How often have you  "found it difficult to postpone urination? 3 (P)     How often have you had a weak urinary stream? 3 (P)     How often have you had to push or strain to begin urination? 1 (P)     How many times did you most typically get up to urinate from the time you went to bed at night until the time you got up in the morning? 5 (P)     Quality of Life: If you were to spend the rest of your life with your urinary condition just the way it is now, how would you feel about that? 2 (P)     AUA SYMPTOM SCORE 16 (P)               Vitals  Vitals:    01/13/25 0946   BP: 132/68   BP Location: Left arm   Patient Position: Sitting   Cuff Size: Standard   Pulse: 94   SpO2: 98%   Weight: 130 kg (287 lb)   Height: 6' 2\" (1.88 m)       Physical Exam  Vitals reviewed.   Constitutional:       General: He is not in acute distress.     Appearance: Normal appearance. He is not ill-appearing.   HENT:      Head: Normocephalic and atraumatic.      Nose: Nose normal.   Eyes:      General: No scleral icterus.  Pulmonary:      Effort: No respiratory distress.   Abdominal:      General: Abdomen is flat. There is no distension.      Palpations: Abdomen is soft.      Tenderness: There is no abdominal tenderness.   Musculoskeletal:         General: Normal range of motion.      Cervical back: Normal range of motion.   Skin:     General: Skin is warm.      Coloration: Skin is not jaundiced.   Neurological:      Mental Status: He is alert and oriented to person, place, and time.      Gait: Gait normal.   Psychiatric:         Mood and Affect: Mood normal.         Behavior: Behavior normal.           Past History  Past Medical History:   Diagnosis Date    BPH (benign prostatic hyperplasia)     Dyslipidemia     Hypertension     Impaired fasting glucose     Osteoarthritis of both knees      Social History     Socioeconomic History    Marital status:      Spouse name: None    Number of children: None    Years of education: None    Highest education level: " None   Occupational History    Occupation: retired   Tobacco Use    Smoking status: Never     Passive exposure: Never    Smokeless tobacco: Never   Vaping Use    Vaping status: Never Used   Substance and Sexual Activity    Alcohol use: Not Currently     Comment: occasional    Drug use: Never    Sexual activity: Not Currently   Other Topics Concern    None   Social History Narrative    None     Social Drivers of Health     Financial Resource Strain: Low Risk  (5/21/2024)    Received from [facility]    Overall Financial Resource Strain (CARDIA)     Difficulty of Paying Living Expenses: Not hard at all   Food Insecurity: No Food Insecurity (5/21/2024)    Received from [facility]    Hunger Vital Sign     Worried About Running Out of Food in the Last Year: Never true     Ran Out of Food in the Last Year: Never true   Transportation Needs: No Transportation Needs (5/21/2024)    Received from [facility]    PRAPARE - Transportation     Lack of Transportation (Medical): No     Lack of Transportation (Non-Medical): No   Physical Activity: Not on file   Stress: Not on file   Social Connections: Not on file   Intimate Partner Violence: Not At Risk (5/21/2024)    Received from [facility]    Humiliation, Afraid, Rape, and Kick questionnaire     Fear of Current or Ex-Partner: No     Emotionally Abused: No     Physically Abused: No     Sexually Abused: No   Housing Stability: Unknown (5/21/2024)    Received from [facility]    Housing Stability Vital Sign     Unable to Pay for Housing in the Last Year: Patient unable to answer     Number of Times Moved in the Last Year: 1     Homeless in the Last Year: No     Social History     Tobacco Use   Smoking Status Never    Passive exposure: Never   Smokeless Tobacco Never     Family History   Problem Relation Age of Onset    Hypertension Mother     Kidney disease Mother     No Known Problems Sister      No Known Problems Brother     No Known Problems Brother     No Known Problems Son     No Known Problems Son     No Known Problems Daughter        The following portions of the patient's history were reviewed and updated as appropriate: allergies, current medications, past medical history, past social history, past surgical history and problem list.    Results  No results found for this or any previous visit (from the past hour).  ]  Lab Results   Component Value Date    PSA 1.030 08/28/2024    PSA 0.92 06/15/2023    PSA 0.7 09/08/2021     Lab Results   Component Value Date    CALCIUM 9.8 08/28/2024    K 4.1 08/28/2024    CO2 26 08/28/2024     08/28/2024    BUN 20 08/28/2024    CREATININE 1.00 08/28/2024     Lab Results   Component Value Date    WBC 5.88 08/28/2024    HGB 13.9 08/28/2024    HCT 41.0 08/28/2024    MCV 89 08/28/2024     08/28/2024

## 2025-01-13 NOTE — ASSESSMENT & PLAN NOTE
Patient's last PSA was performed 8/28/2024 and found to be 1.030.  Refer to PSA trend below.  We discussed that since initiating therapy with finasteride, that after 6 months of utilizing this medication the patient's PSA will be halved.  We discussed moving forward to get a true value we will have to double the value by 2.  We discussed that the patient will be due for repeat PSA in 1 year from his last and that he should follow-up in the office at that time  Patient will repeat his PSA in 1 year from his last with follow-up in the office at that time.    Lab Results   Component Value Date    PSA 1.030 08/28/2024    PSA 0.92 06/15/2023    PSA 0.7 09/08/2021

## 2025-01-25 DIAGNOSIS — N40.1 BENIGN PROSTATIC HYPERPLASIA WITH URINARY FREQUENCY: ICD-10-CM

## 2025-01-25 DIAGNOSIS — R35.0 BENIGN PROSTATIC HYPERPLASIA WITH URINARY FREQUENCY: ICD-10-CM

## 2025-01-27 RX ORDER — FINASTERIDE 5 MG/1
5 TABLET, FILM COATED ORAL DAILY
Qty: 30 TABLET | Refills: 5 | Status: SHIPPED | OUTPATIENT
Start: 2025-01-27

## 2025-02-12 PROBLEM — Z12.5 SCREENING FOR PROSTATE CANCER: Status: RESOLVED | Noted: 2025-01-13 | Resolved: 2025-02-12

## 2025-02-17 ENCOUNTER — OFFICE VISIT (OUTPATIENT)
Dept: FAMILY MEDICINE CLINIC | Facility: CLINIC | Age: 70
End: 2025-02-17
Payer: MEDICARE

## 2025-02-17 VITALS
OXYGEN SATURATION: 96 % | HEIGHT: 74 IN | HEART RATE: 86 BPM | TEMPERATURE: 97.2 F | WEIGHT: 289.2 LBS | SYSTOLIC BLOOD PRESSURE: 120 MMHG | DIASTOLIC BLOOD PRESSURE: 74 MMHG | BODY MASS INDEX: 37.12 KG/M2

## 2025-02-17 DIAGNOSIS — Z00.00 MEDICARE ANNUAL WELLNESS VISIT, SUBSEQUENT: Primary | ICD-10-CM

## 2025-02-17 DIAGNOSIS — R73.01 IMPAIRED FASTING GLUCOSE: ICD-10-CM

## 2025-02-17 DIAGNOSIS — G89.29 CHRONIC LEFT SHOULDER PAIN: ICD-10-CM

## 2025-02-17 DIAGNOSIS — I10 BENIGN ESSENTIAL HYPERTENSION: ICD-10-CM

## 2025-02-17 DIAGNOSIS — M25.512 CHRONIC LEFT SHOULDER PAIN: ICD-10-CM

## 2025-02-17 PROCEDURE — G2211 COMPLEX E/M VISIT ADD ON: HCPCS | Performed by: FAMILY MEDICINE

## 2025-02-17 PROCEDURE — 99214 OFFICE O/P EST MOD 30 MIN: CPT | Performed by: FAMILY MEDICINE

## 2025-02-17 PROCEDURE — G0439 PPPS, SUBSEQ VISIT: HCPCS | Performed by: FAMILY MEDICINE

## 2025-02-17 PROCEDURE — G0444 DEPRESSION SCREEN ANNUAL: HCPCS | Performed by: FAMILY MEDICINE

## 2025-02-17 NOTE — ASSESSMENT & PLAN NOTE
I reviewed with pt. Continue present care. Check labs. Recheck 6m  Orders:  •  CBC and differential; Future  •  Comprehensive metabolic panel; Future  •  Lipid panel; Future

## 2025-02-17 NOTE — PROGRESS NOTES
Name: Markie Barrios      : 1955      MRN: [id number]  Encounter Provider: Lonnie Huitron MD  Encounter Date: 2025   Encounter department: [facility] Plan  Medicare annual wellness visit, subsequent         Benign essential hypertension  I reviewed with pt. Continue present care. Check labs. Recheck 6m  Orders:  •  CBC and differential; Future  •  Comprehensive metabolic panel; Future  •  Lipid panel; Future    Chronic left shoulder pain  Suspect adhesive capsulitis. Check XR.  Refer to PT. Recheck 3-4w - ortho eval if not improving  Orders:  •  Ambulatory Referral to Physical Therapy; Future  •  XR shoulder 2+ vw left; Future    Impaired fasting glucose  Check labs including A1C. Recheck 6m  Orders:  •  Hemoglobin A1C; Future       Preventive health issues were discussed with patient, and age appropriate screening tests were ordered as noted in patient's After Visit Summary. Personalized health advice and appropriate referrals for health education or preventive services given if needed, as noted in patient's After Visit Summary.    History of Present Illness     f/u multiple med issues and AWV  - pt states that he is unchanged.   - pt still has nocturia and other BPH symptoms. Pt on Flomax 0.8 and finasteride.   - pt continues to have issues remaining asleep (can fall asleep without an issue).   - R knee doing well s/p TKR. L knee starting to go. ?feels he may need a TKR?  - pt has not been exercising regularly.  Denies CP, palpitations, lightheadedness or other CV symptoms with or without exertion  - pt denies any new GI issues.  Due for repeat colonoscopy this  - pt still with urinary frequency and nocturia. Seems to be worse at night or if he is moving around (not as bad when he is sitting).  Has not seen Uro.  No hesitancy, some urgency, (+) weak stream, and no post void dribbling. Pt on Flomax 1.2mg  qd.   - I reviewed recent labs with pt.  Chol acceptable. Fasting .          Patient Care Team:  Lonnie Huitron MD as PCP - General (Family Medicine)    Review of Systems   Constitutional: Negative.    HENT: Negative.     Eyes: Negative.    Respiratory: Negative.     Cardiovascular: Negative.    Gastrointestinal: Negative.    Endocrine: Negative.    Genitourinary:  Positive for frequency. Negative for dysuria and hematuria.        (+) nocturia   Musculoskeletal:  Positive for arthralgias (L knee), gait problem and joint swelling (occ L knee). Negative for back pain and myalgias.   Skin: Negative.    Allergic/Immunologic: Negative.    Neurological:  Negative for dizziness, weakness, light-headedness, numbness and headaches.   Hematological: Negative.    Psychiatric/Behavioral: Negative.       Medical History Reviewed by provider this encounter:  Tobacco  Allergies  Meds  Problems  Med Hx  Surg Hx  Fam Hx       Annual Wellness Visit Questionnaire   Markie is here for his Subsequent Wellness visit. Last Medicare Wellness visit information reviewed, patient interviewed and updates made to the record today.      Health Risk Assessment:   Patient rates overall health as very good. Patient feels that their physical health rating is same. Patient is satisfied with their life. Eyesight was rated as same. Hearing was rated as same. Patient feels that their emotional and mental health rating is same. Patients states they are never, rarely angry. Patient states they are sometimes unusually tired/fatigued. Pain experienced in the last 7 days has been some. Patient's pain rating has been 3/10. Patient states that he has experienced no weight loss or gain in last 6 months. L knee pain    Depression Screening:   PHQ-2 Score: 0      Fall Risk Screening:   In the past year, patient has experienced: no history of falling in past year      Home Safety:  Patient does not have trouble with stairs inside or outside of their home. Patient has working smoke  alarms and has no working carbon monoxide detector. Home safety hazards include: none.     Nutrition:   Current diet is Regular and Limited junk food.     Medications:   Patient is not currently taking any over-the-counter supplements. Patient is able to manage medications.     Activities of Daily Living (ADLs)/Instrumental Activities of Daily Living (IADLs):   Walk and transfer into and out of bed and chair?: Yes  Dress and groom yourself?: Yes    Bathe or shower yourself?: Yes    Feed yourself? Yes  Do your laundry/housekeeping?: Yes  Manage your money, pay your bills and track your expenses?: Yes  Make your own meals?: Yes    Do your own shopping?: Yes    Previous Hospitalizations:   Any hospitalizations or ED visits within the last 12 months?: Yes    How many hospitalizations have you had in the last year?: 1-2    Hospitalization Comments: L TKR    Advance Care Planning:   Living will: Yes    Durable POA for healthcare: Yes    Advanced directive: Yes    Advanced directive counseling given: Yes      Cognitive Screening:   Provider or family/friend/caregiver concerned regarding cognition?: No    PREVENTIVE SCREENINGS      Cardiovascular Screening:    General: Screening Current      Diabetes Screening:     General: Screening Current      Colorectal Cancer Screening:     General: Screening Current      Prostate Cancer Screening:    General: Screening Current      Osteoporosis Screening:    General: Screening Not Indicated      Abdominal Aortic Aneurysm (AAA) Screening:    Risk factors include: age between 65-76 yo        Lung Cancer Screening:     General: Screening Not Indicated      Hepatitis C Screening:    General: Screening Current    Screening, Brief Intervention, and Referral to Treatment (SBIRT)     Screening      AUDIT-C Screenin) How often did you have a drink containing alcohol in the past year? never  2) How many drinks did you have on a typical day when you were drinking in the past year? 0  3) How  "often did you have 6 or more drinks on one occasion in the past year? never    AUDIT-C Score: 0  Interpretation: Score 0-3 (male): Negative screen for alcohol misuse    Single Item Drug Screening:  How often have you used an illegal drug (including marijuana) or a prescription medication for non-medical reasons in the past year? never    Single Item Drug Screen Score: 0  Interpretation: Negative screen for possible drug use disorder    Time Spent  Time spent screening/evaluating the patient for alcohol misuse: 1 minutes.     Annual Depression Screening  Time spent screening and evaluating the patient for depression during today's encounter was 5 minutes.    Other Counseling Topics:   Calcium and vitamin D intake and regular weightbearing exercise.     Social Drivers of Health     Financial Resource Strain: Low Risk  (5/21/2024)    [facility]    Overall Financial Resource Strain (CARDIA)    • Difficulty of Paying Living Expenses: Not hard at all   Food Insecurity: No Food Insecurity (2/17/2025)    Hunger Vital Sign    • Worried About Running Out of Food in the Last Year: Never true    • Ran Out of Food in the Last Year: Never true   Transportation Needs: No Transportation Needs (2/17/2025)    PRAPARE - Transportation    • Lack of Transportation (Medical): No    • Lack of Transportation (Non-Medical): No   Housing Stability: Low Risk  (2/17/2025)    Housing Stability Vital Sign    • Unable to Pay for Housing in the Last Year: No    • Number of Times Moved in the Last Year: 0    • Homeless in the Last Year: No   Utilities: Not At Risk (2/17/2025)    [facility] Utilities    • Threatened with loss of utilities: No     No results found.    Objective   /74   Pulse 86   Temp (!) 97.2 °F (36.2 °C)   Ht 6' 2\" (1.88 m)   Wt 131 kg (289 lb 3.2 oz)   SpO2 96%   BMI 37.13 kg/m²     Physical Exam  Vitals reviewed.   HENT:      Head: Normocephalic.      Right Ear: Tympanic membrane, ear canal " and external ear normal.      Left Ear: Tympanic membrane, ear canal and external ear normal.      Mouth/Throat:      Mouth: Mucous membranes are moist.   Eyes:      Extraocular Movements: Extraocular movements intact.      Conjunctiva/sclera: Conjunctivae normal.      Pupils: Pupils are equal, round, and reactive to light.   Cardiovascular:      Rate and Rhythm: Normal rate and regular rhythm.   Pulmonary:      Effort: Pulmonary effort is normal.   Abdominal:      General: There is no distension.      Palpations: There is no mass.      Tenderness: There is no abdominal tenderness.   Musculoskeletal:         General: Tenderness and deformity present.      Cervical back: No tenderness.      Right lower leg: No edema.      Left lower leg: No edema.      Comments: Left shoulder with decreased range of motion in all planes (90 degrees abduction or anterior flexion, and 30 to 45 degrees internal and external rotation).   Lymphadenopathy:      Cervical: No cervical adenopathy.   Skin:     General: Skin is warm.      Capillary Refill: Capillary refill takes less than 2 seconds.   Neurological:      General: No focal deficit present.      Mental Status: He is alert and oriented to person, place, and time.   Psychiatric:         Mood and Affect: Mood normal.         Behavior: Behavior normal.         Thought Content: Thought content normal.         Judgment: Judgment normal.      Comments: PHQ-2/9 Depression Screening    Little interest or pleasure in doing things: 0 - not at all  Feeling down, depressed, or hopeless: 0 - not at all  PHQ-2 Score: 0  PHQ-2 Interpretation: Negative depression screen

## 2025-02-24 ENCOUNTER — TELEPHONE (OUTPATIENT)
Age: 70
End: 2025-02-24

## 2025-02-24 DIAGNOSIS — Z12.11 SCREEN FOR COLON CANCER: Primary | ICD-10-CM

## 2025-02-24 NOTE — TELEPHONE ENCOUNTER
Pt wants to know who Dr. Lopez recommends he see for his colonoscopy. He would like to go to whomever he recommends. Thank you.

## 2025-03-04 ENCOUNTER — HOSPITAL ENCOUNTER (OUTPATIENT)
Dept: RADIOLOGY | Facility: IMAGING CENTER | Age: 70
Discharge: HOME | End: 2025-03-04
Payer: MEDICARE

## 2025-03-04 ENCOUNTER — EVALUATION (OUTPATIENT)
Dept: PHYSICAL THERAPY | Facility: REHABILITATION | Age: 70
End: 2025-03-04
Payer: MEDICARE

## 2025-03-04 DIAGNOSIS — M25.512 CHRONIC LEFT SHOULDER PAIN: Primary | ICD-10-CM

## 2025-03-04 DIAGNOSIS — G89.29 CHRONIC LEFT SHOULDER PAIN: ICD-10-CM

## 2025-03-04 DIAGNOSIS — G89.29 CHRONIC LEFT SHOULDER PAIN: Primary | ICD-10-CM

## 2025-03-04 DIAGNOSIS — M25.512 CHRONIC LEFT SHOULDER PAIN: ICD-10-CM

## 2025-03-04 PROCEDURE — 97162 PT EVAL MOD COMPLEX 30 MIN: CPT | Performed by: PHYSICAL THERAPIST

## 2025-03-04 PROCEDURE — 73030 X-RAY EXAM OF SHOULDER: CPT

## 2025-03-04 NOTE — PROGRESS NOTES
PT Evaluation     Today's date: 3/4/2025  Patient name: Markie Barrios  : 1955  MRN: [id number]  Referring provider: Michael Huitron*  Dx:   Encounter Diagnosis     ICD-10-CM    1. Chronic left shoulder pain  M25.512 Ambulatory Referral to Physical Therapy    G89.29                      Assessment  Impairments: abnormal coordination, abnormal or restricted ROM, activity intolerance, impaired physical strength, lacks appropriate home exercise program, pain with function and poor posture   Symptom irritability: high    Assessment details: Pt is a pleasant 69 year-old right-handed male presenting to PT with chronic history left shoulder pain. Pt presents with left shoulder pain, impaired posture and postural awareness, decreased left shoulder range of motion, decreased left shoulder strength, impaired ability to perform ADLs, and decreased tolerance to activity. Pt is a good candidate for outpatient physical therapy and would benefit from skilled physical therapy to address limitations and to achieve goals. Thank you for this referral.  Understanding of Dx/Px/POC: good     Prognosis: good    Goals  Short-Term Goals (4 weeks)   1. Patient will decrease worst rating of pain by 25% to improve quality of life.  2. Patient will increase strength by 1/2 MMT to improve quality of life with improved efficiency of daily activities.  3. Patient will improve ROM by 25% indicating improved mobility of affected area.    Long-Term Goals (8 weeks)   1. Patient will decrease pain by 50% at worst in comparison to IE indicating significant reduction in pain and improved quality of life.  2. Patient will demonstrate strength WFL compared to IE levels indicating ability to independently manage pain symptoms to accomplish daily activities.   3. Patient will be independent with HEP with good form accomplished.      Plan  Patient would benefit from: PT eval and skilled PT  Planned modality interventions: cryotherapy and  thermotherapy: hydrocollator packs    Planned therapy interventions: IADL retraining, body mechanics training, flexibility, functional ROM exercises, home exercise program, neuromuscular re-education, manual therapy, postural training, strengthening, stretching, therapeutic activities, therapeutic exercise, joint mobilization and IASTM    Frequency: 2x week  Duration in weeks: 8  Treatment plan discussed with: patient        Subjective Evaluation    History of Present Illness  Mechanism of injury: Pt is a 69 year-old right-handed male presenting to PT with chronic history left shoulder pain. Pt denies any trauma, injury or preceding event. Pt reports his shoulder pain has progressively worsened over the past few months. Pt went to PCP for further evaluation, x-rays ordered, however, pt has not yet completed this. Pt was not prescribed medication for pain. Pt referred to OPPT.    Pt is retired.    Pain Location: left shoulder    Pain Type: sharp pain with movement    Pain Intensity:  Current: 2  Best: 2  Worst: 10    Pt reports increased pain and/or difficulty with: reaching overhead, closing car mariluz, laying on left side, rolling onto left shoulder. Pt reports sleep disturbance secondary to pain waking  times/night.    Pt reports decreased pain with: rest            Recurrent probem    Quality of life: good    Patient Goals  Patient goals for therapy: decreased pain, increased motion, increased strength and independence with ADLs/IADLs      Diagnostic Tests  No diagnostic tests performed  Treatments  No previous or current treatments        Objective     Postural Observations    Additional Postural Observation Details  Moderate forward head, bilateral protracted/IR shoulders, increased thoracic kyphosis.     Tenderness     Left Shoulder   Tenderness in the AC joint and biceps tendon (proximal).     Cervical/Thoracic Screen   Cervical range of motion within normal limits  Cervical range of motion within normal limits  with the following exceptions: Localized left lower cervical discomfort with bilateral cervical rotation without reproduction of left shoulder symptoms/pain.    Neurological Testing     Sensation     Shoulder   Left Shoulder   Intact: light touch    Right Shoulder   Intact: Light touch    Active Range of Motion   Left Shoulder   Flexion: 92 degrees with pain  Abduction: 57 degrees with pain  External rotation BTH: Active external rotation behind the head: Left ear with compensatory cervical and thoracic flexion. with pain  Internal rotation BTB: sacrum with pain    Right Shoulder   Flexion: 115 degrees   Abduction: 104 degrees with pain  External rotation BTH: Active external rotation behind the head: Right ear with compensatory cervical and thoracic flexion. with pain  Internal rotation BTB: L1     Joint Play   Left Shoulder  Hypomobile in the anterior capsule, posterior capsule and inferior capsule.    Right Shoulder  Hypomobile in the anterior capsule, posterior capsule and inferior capsule.     Strength/Myotome Testing     Left Shoulder     Planes of Motion   Flexion: 3-   Abduction: 3-   External rotation at 0°: 3-   Internal rotation at 0°: 3-     Isolated Muscles   Biceps: 4+   Lower trapezius: 2-   Middle trapezius: 2-   Triceps: 4+     Right Shoulder     Planes of Motion   Flexion: 4-   Abduction: 4-   External rotation at 0°: 4+   Internal rotation at 0°: 4+     Isolated Muscles   Biceps: 5   Lower trapezius: 3-   Middle trapezius: 3-   Triceps: 5     Tests     Left Shoulder   Negative external rotation lag sign.     Right Shoulder   Negative external rotation lag sign.                Precautions:   Patient Active Problem List   Diagnosis    Abnormal electromyogram    Benign essential hypertension    BPH (benign prostatic hyperplasia)    DDD (degenerative disc disease), lumbosacral    Diastasis recti    Impaired fasting glucose    Other male erectile dysfunction    Low HDL (under 40)    Snoring    Spinal  stenosis of lumbar region    Bilateral primary osteoarthritis of knee    Hypovolemia    Hyponatremia    Obesity, morbid (HCC)     Daily Treatment Diary      Visit # 1 2 3 4 5 6 7 8 9 10   Assessment 3/4                     Dewey/Donavon LARIOS                 **   FOTO  53/67       **         **   Manuals    L GH Mobs: Dist, Inf, Post                        L Shoulder PROM                          Prescribed POC    Pt Education  MD                      HEP Issued/Updated  MD                      Pec/Biceps Stretch with MHP To Begin              Pulleys with L Shoulder MHP                        Table Slides: Flexion & Scaption                        Table ER Stretch              H/L Wand Press-Ups                        H/L Wand Flexion                        H/L Pec Stretch                        Scap Retractions                        Low Trap Retractions with B Shoulder ER                        Pec/Biceps Stretch with CP To Conclude                                                                      Modalities   MHP + Pec/Biceps Stretch                       CP +Pec/Biceps Stretch             QR Code:    [facility] HEP:  Next Session

## 2025-03-06 ENCOUNTER — OFFICE VISIT (OUTPATIENT)
Dept: PHYSICAL THERAPY | Facility: REHABILITATION | Age: 70
End: 2025-03-06
Payer: MEDICARE

## 2025-03-06 ENCOUNTER — RESULTS FOLLOW-UP (OUTPATIENT)
Dept: FAMILY MEDICINE CLINIC | Facility: CLINIC | Age: 70
End: 2025-03-06

## 2025-03-06 DIAGNOSIS — G89.29 CHRONIC LEFT SHOULDER PAIN: Primary | ICD-10-CM

## 2025-03-06 DIAGNOSIS — M19.012 ARTHRITIS OF LEFT SHOULDER REGION: ICD-10-CM

## 2025-03-06 DIAGNOSIS — M25.512 CHRONIC LEFT SHOULDER PAIN: Primary | ICD-10-CM

## 2025-03-06 PROCEDURE — 97140 MANUAL THERAPY 1/> REGIONS: CPT

## 2025-03-06 PROCEDURE — 97110 THERAPEUTIC EXERCISES: CPT

## 2025-03-06 PROCEDURE — 97112 NEUROMUSCULAR REEDUCATION: CPT

## 2025-03-06 NOTE — PROGRESS NOTES
Daily Note     Today's date: 3/6/2025  Patient name: Markie Barrios  : 1955  MRN: [id number]  Referring provider: Michael Huitron*  Dx:   Encounter Diagnosis     ICD-10-CM    1. Chronic left shoulder pain  M25.512     G89.29                      Subjective: Pt comes to therapy reporting he feels mild improvement since IE with increased postural awareness. He ordered pulleys and has them at home, however has not begun using them yet. He had x-rays completed and message in [employer] with results, advised by PCP to f/u with orthopedics. Pt provided this information in clinic today.      Objective: See treatment diary below  Issued updated HEP to which pt verbalizes and demonstrates understanding.      Assessment: Pt tolerated treatment fair. Initiated POC with focus on AAROM and mobility. Moderate cues t/o for technique, dosage and relaxation. Pt uses assist with R UE to bring arm back in during table slide, which decreased pain levels during this portion of exercise. Max cues for relaxation during manual techniques with mild improvement in GH mobility afterward. Pt denies adverse response post treatment, will continue to progress as able in upcoming visits. Pt would benefit from continued skilled PT to improve current deficits and maximize function.      Plan: Continue per plan of care.  Progress treatment as tolerated.       Precautions:   Patient Active Problem List   Diagnosis    Abnormal electromyogram    Benign essential hypertension    BPH (benign prostatic hyperplasia)    DDD (degenerative disc disease), lumbosacral    Diastasis recti    Impaired fasting glucose    Other male erectile dysfunction    Low HDL (under 40)    Snoring    Spinal stenosis of lumbar region    Bilateral primary osteoarthritis of knee    Hypovolemia    Hyponatremia    Obesity, morbid (HCC)     Daily Treatment Diary      Visit # 1 2 3 4 5 6 7 8 9 10   Assessment 3/4  3/6                   Dewey/Donavon LARIOS                  "**   FOTO  53/67       **         **   Manuals    L GH Mobs: Dist, Inf, Post    SE                    L Shoulder PROM  SE                        Prescribed POC    Pt Education  MD                      HEP Issued/Updated  MD                      Pec/Biceps Stretch with MHP To Begin  5' pre-tx            Pulleys with L Shoulder MHP    5\"x5 min                    Table Slides: Flexion & Scaption    5\"x10  R UE assist                    Table ER Stretch  10\"x3            H/L Wand Press-Ups                        H/L Wand Flexion                        H/L Pec Stretch    15\"x3                    Scap Retractions                        Low Trap Retractions with B Shoulder ER                        Pec/Biceps Stretch with CP To Conclude                                                                      Modalities   MHP + Pec/Biceps Stretch    5' pre-tx                   CP +Pec/Biceps Stretch  10' post-tx           QR Code:    [facility] HEP:  Access Code: [email or web address]  URL: [email or web address]  Date: 03/06/2025  Prepared by: Sherry Mclean    Exercises  - Supine Single Arm Pectoralis Stretch  - 1-2 x daily - 31 sets - 3 reps - 15 hold  - Seated Shoulder Flexion Towel Slide at Table Top Full Range of Motion  - 1-2 x daily - 1 sets - 10 reps - 5 hold  - Seated Shoulder Scaption Slide at Table Top with Forearm in Neutral  - 1-2 x daily - 1 sets - 10 reps - 5 hold  - Seated Shoulder External Rotation PROM on Table  - 1-2 x daily - 1 sets - 3 reps - 10 hold    Patient Education  - Ice       "

## 2025-03-11 ENCOUNTER — OFFICE VISIT (OUTPATIENT)
Dept: PHYSICAL THERAPY | Facility: REHABILITATION | Age: 70
End: 2025-03-11
Payer: MEDICARE

## 2025-03-11 DIAGNOSIS — M25.512 CHRONIC LEFT SHOULDER PAIN: Primary | ICD-10-CM

## 2025-03-11 DIAGNOSIS — G89.29 CHRONIC LEFT SHOULDER PAIN: Primary | ICD-10-CM

## 2025-03-11 PROCEDURE — 97140 MANUAL THERAPY 1/> REGIONS: CPT

## 2025-03-11 PROCEDURE — 97112 NEUROMUSCULAR REEDUCATION: CPT

## 2025-03-11 PROCEDURE — 97110 THERAPEUTIC EXERCISES: CPT

## 2025-03-11 NOTE — PROGRESS NOTES
"Daily Note     Today's date: 3/11/2025  Patient name: Markie Barrios  : 1955  MRN: [id number]  Referring provider: Michael Huitron*  Dx:   Encounter Diagnosis     ICD-10-CM    1. Chronic left shoulder pain  M25.512     G89.29                      Subjective: Pt reports he is using the pulleys at home \"religiously\", however he forgot about the remainder of his HEP since lv. He has an appointment scheduled with Dr. Flood for his shoulder on .    Objective: See treatment diary below  Reviewed current HEP and issued new copy, to which pt verbalizes and demonstrates understanding.    Assessment: Pt tolerated treatment well. Continues to require mod-max cues for relaxation during manuals, demonstrates improved GH mobility upon completion. Improved tolerance to table slides on this date. Good tolerance to addition of wand flexion and press ups as well as scap retractions. Pt demonstrates further gradual improvement of ROM with completion of pulleys. Will continue to progress as able. Pt would benefit from continued skilled PT to improve current deficits and maximize function.    Plan: Continue per plan of care.  Progress treatment as tolerated.       Precautions:   Patient Active Problem List   Diagnosis    Abnormal electromyogram    Benign essential hypertension    BPH (benign prostatic hyperplasia)    DDD (degenerative disc disease), lumbosacral    Diastasis recti    Impaired fasting glucose    Other male erectile dysfunction    Low HDL (under 40)    Snoring    Spinal stenosis of lumbar region    Bilateral primary osteoarthritis of knee    Hypovolemia    Hyponatremia    Obesity, morbid (HCC)     Daily Treatment Diary      Visit # 1 2 3 4 5 6 7 8 9 10   Assessment 3/4  3/6  3/11                 Dewey/Donavon LARIOS                 **   FOTO  53/67       **         **   Manuals    L GH Mobs: Dist, Inf, Post    SE  SE                  L Shoulder PROM  SE SE                       Prescribed POC    Pt " "Education  MD                      HEP Issued/Updated  MD    SE                  Pec/Biceps Stretch with MHP To Begin  5' pre-tx 5'           Pulleys with L Shoulder MHP    5\"x5 min  5\"x5 min                  Table Slides: Flexion & Scaption    5\"x10  R UE assist  5\"x10 each                  Table ER Stretch  10\"x3 10\"x3           H/L Wand Press-Ups      5\"  1x10                  H/L Wand Flexion      5\"  1x10                  H/L Pec Stretch    15\"x3  reviewed                  Scap Retractions      5\"x10                  Low Trap Retractions with B Shoulder ER                       Pec/Biceps Stretch with CP To Conclude      10'                                                                Modalities   MHP + Pec/Biceps Stretch    5' pre-tx  5' pre-tx                 CP +Pec/Biceps Stretch  10' post-tx 10' post-tx          QR Code:    Access Code: [email or web address]  URL: [email or web address]  Date: 03/11/2025  Prepared by: Sherry Mclean    Exercises  - Supine Single Arm Pectoralis Stretch  - 1-2 x daily - 31 sets - 3 reps - 15 hold  - Seated Shoulder Flexion Towel Slide at Table Top Full Range of Motion  - 1-2 x daily - 1 sets - 10 reps - 5 hold  - Seated Shoulder Scaption Slide at Table Top with Forearm in Neutral  - 1-2 x daily - 1 sets - 10 reps - 5 hold  - Seated Shoulder External Rotation PROM on Table  - 1-2 x daily - 1 sets - 3 reps - 10 hold  - Seated Scapular Retraction  - 1-2 x daily - 1 sets - 10 reps - 5 hold    Patient Education  - Ice  "

## 2025-03-13 ENCOUNTER — TELEPHONE (OUTPATIENT)
Age: 70
End: 2025-03-13

## 2025-03-13 ENCOUNTER — OFFICE VISIT (OUTPATIENT)
Dept: PHYSICAL THERAPY | Facility: REHABILITATION | Age: 70
End: 2025-03-13
Payer: MEDICARE

## 2025-03-13 DIAGNOSIS — G89.29 CHRONIC LEFT SHOULDER PAIN: Primary | ICD-10-CM

## 2025-03-13 DIAGNOSIS — M25.512 CHRONIC LEFT SHOULDER PAIN: Primary | ICD-10-CM

## 2025-03-13 PROCEDURE — 97110 THERAPEUTIC EXERCISES: CPT

## 2025-03-13 PROCEDURE — 97112 NEUROMUSCULAR REEDUCATION: CPT

## 2025-03-13 PROCEDURE — 97140 MANUAL THERAPY 1/> REGIONS: CPT

## 2025-03-13 NOTE — TELEPHONE ENCOUNTER
Patient called in and RN reviewed provider comments for lsft shulder xray. Patient is scheduled to see Robbi servin on 4/2 while continuing PT.

## 2025-03-13 NOTE — TELEPHONE ENCOUNTER
Caller: Patient    Doctor: Dr. Huitron    Reason for call: Questioned results of shoulder xray? Advised patient to contact his PCP    Call back#: [phone]

## 2025-03-13 NOTE — PROGRESS NOTES
Daily Note     Today's date: 3/13/2025  Patient name: Markie Barrios  : 1955  MRN: [id number]  Referring provider: Michael Huitron*  Dx:   Encounter Diagnosis     ICD-10-CM    1. Chronic left shoulder pain  M25.512     G89.29                      Subjective: Pt reports that he did not do any of his HEP since lv. He states his shoulder feels about the same, however he has been sleeping better the last few nights with less pain.    Objective: See treatment diary below    Assessment: Pt tolerated treatment well with progressions as listed below. Favorable response to manual techniques with improved GH mobility upon completion, however pt continues to require max cues t/o for relaxation, empty end feels secondary to pain. Improved ROM with completion of pulleys+MHP. Advised pt on completion of HEP and consistency to improve therapeutic outcomes and discussed strategies to remember them at home, to which he verbalizes understanding. Will continue to progress as able. Pt would benefit from continued skilled PT to improve current deficits and maximize function.    Plan: Continue per plan of care.  Progress treatment as tolerated.       Precautions:   Patient Active Problem List   Diagnosis    Abnormal electromyogram    Benign essential hypertension    BPH (benign prostatic hyperplasia)    DDD (degenerative disc disease), lumbosacral    Diastasis recti    Impaired fasting glucose    Other male erectile dysfunction    Low HDL (under 40)    Snoring    Spinal stenosis of lumbar region    Bilateral primary osteoarthritis of knee    Hypovolemia    Hyponatremia    Obesity, morbid (HCC)     Daily Treatment Diary      Visit # 1 2 3 4 5 6 7 8 9 10   Assessment 3/4  3/6  3/11  3/13               Dewey/Donavon LARIOS                 **   FOTO  53/67       **         **   Manuals    L GH Mobs: Dist, Inf, Post    SE  SE SE                L Shoulder PROM  SE SE SE                      Prescribed POC    Pt Education  MD      "                 HEP Issued/Updated  MD    SE                  Pec/Biceps Stretch with MHP To Begin  5' pre-tx 5' 5'          Pulleys with L Shoulder MHP    5\"x5 min  5\"x5 min  5\"x5 min                Table Slides: Flexion & Scaption    5\"x10  R UE assist  5\"x10 each  5\"x10 ea                Table ER Stretch  10\"x3 10\"x3 10\"x5          H/L Wand Press-Ups      5\"  1x10  5\"  1x15                H/L Wand Flexion      5\"  1x10  5\"  1x10                H/L Pec Stretch    15\"x3  reviewed                  Scap Retractions      5\"x10  5\"x10                Low Trap Retractions with B Shoulder ER                       Pec/Biceps Stretch with CP To Conclude      10'  10'                                                              Modalities   MHP + Pec/Biceps Stretch    5' pre-tx  5' pre-tx  5' pre-tx               CP +Pec/Biceps Stretch  10' post-tx 10' post-tx 10' post-tx         QR Code:    Access Code: [email or web address]  URL: [email or web address]  Date: 03/11/2025  Prepared by: Sherry Mclean    Exercises  - Supine Single Arm Pectoralis Stretch  - 1-2 x daily - 31 sets - 3 reps - 15 hold  - Seated Shoulder Flexion Towel Slide at Table Top Full Range of Motion  - 1-2 x daily - 1 sets - 10 reps - 5 hold  - Seated Shoulder Scaption Slide at Table Top with Forearm in Neutral  - 1-2 x daily - 1 sets - 10 reps - 5 hold  - Seated Shoulder External Rotation PROM on Table  - 1-2 x daily - 1 sets - 3 reps - 10 hold  - Seated Scapular Retraction  - 1-2 x daily - 1 sets - 10 reps - 5 hold    Patient Education  - Ice  "

## 2025-03-18 ENCOUNTER — OFFICE VISIT (OUTPATIENT)
Dept: PHYSICAL THERAPY | Facility: REHABILITATION | Age: 70
End: 2025-03-18
Payer: MEDICARE

## 2025-03-18 DIAGNOSIS — G89.29 CHRONIC LEFT SHOULDER PAIN: Primary | ICD-10-CM

## 2025-03-18 DIAGNOSIS — M25.512 CHRONIC LEFT SHOULDER PAIN: Primary | ICD-10-CM

## 2025-03-18 PROCEDURE — 97140 MANUAL THERAPY 1/> REGIONS: CPT

## 2025-03-18 PROCEDURE — 97110 THERAPEUTIC EXERCISES: CPT

## 2025-03-18 PROCEDURE — 97112 NEUROMUSCULAR REEDUCATION: CPT

## 2025-03-18 NOTE — PROGRESS NOTES
"Daily Note     Today's date: 3/18/2025  Patient name: Markie Barrios  : 1955  MRN: [id number]  Referring provider: Michael Huitron*  Dx:   Encounter Diagnosis     ICD-10-CM    1. Chronic left shoulder pain  M25.512     G89.29                      Subjective: Pt reports that his shoulder is feeling better and he has completed his HEP every day since lv.    Objective: See treatment diary below    Assessment: Pt with fair tolerance to manual techniques performed. Difficult to achieve improve PROM due to guarding and pain t/o completion. Good tolerance to GH glides. Painful wand flexion, unable to complete all reps, will hold for nv. Good tolerance to remainder of TE without adverse response. All activity completed to tolerance with min increase in pain as instructed. Pt would benefit from continued skilled PT to improve current deficits and maximize function.    Plan: Continue per plan of care.  Progress treatment as tolerated.       Precautions:   Patient Active Problem List   Diagnosis    Abnormal electromyogram    Benign essential hypertension    BPH (benign prostatic hyperplasia)    DDD (degenerative disc disease), lumbosacral    Diastasis recti    Impaired fasting glucose    Other male erectile dysfunction    Low HDL (under 40)    Snoring    Spinal stenosis of lumbar region    Bilateral primary osteoarthritis of knee    Hypovolemia    Hyponatremia    Obesity, morbid (HCC)     Daily Treatment Diary      Visit # 1 2 3 4 5 6 7 8 9 10   Assessment 3/4  3/6  3/11  3/13  3/18             Dewey/Donavon LARIOS                 **   FOTO  53/67       **         **   Manuals    L GH Mobs: Dist, Inf, Post    SE  SE SE  SE              L Shoulder PROM  SE SE SE SE                     Prescribed POC    Pt Education  MD                      HEP Issued/Updated  MD MARIE                  Pec/Biceps Stretch with MHP To Begin  5' pre-tx 5' 5' 5'         Pulleys with L Shoulder MHP    5\"x5 min  5\"x5 min  5\"x5 min  5\"x5 " "min              Table Slides: Flexion & Scaption    5\"x10  R UE assist  5\"x10 each  5\"x10 ea  5\"x15 ea              Table ER Stretch  10\"x3 10\"x3 10\"x5 10\"x6         H/L Wand Press-Ups      5\"  1x10  5\"  1x15  5\"  2x10              H/L Wand Flexion      5\"  1x10  5\"  1x10  5\"  1x8  hold            H/L Pec Stretch    15\"x3  reviewed                  Scap Retractions      5\"x10  5\"x10  5\"x15              Low Trap Retractions with B Shoulder ER         5\"x10              Pec/Biceps Stretch with CP To Conclude      10'  10'  10'                                                            Modalities   MHP + Pec/Biceps Stretch    5' pre-tx  5' pre-tx  5' pre-tx  5' pre-tx             CP +Pec/Biceps Stretch  10' post-tx 10' post-tx 10' post-tx 10' post-tx        QR Code:    Access Code: [email or web address]  URL: [email or web address]  Date: 03/11/2025  Prepared by: Sherry Mclean    Exercises  - Supine Single Arm Pectoralis Stretch  - 1-2 x daily - 31 sets - 3 reps - 15 hold  - Seated Shoulder Flexion Towel Slide at Table Top Full Range of Motion  - 1-2 x daily - 1 sets - 10 reps - 5 hold  - Seated Shoulder Scaption Slide at Table Top with Forearm in Neutral  - 1-2 x daily - 1 sets - 10 reps - 5 hold  - Seated Shoulder External Rotation PROM on Table  - 1-2 x daily - 1 sets - 3 reps - 10 hold  - Seated Scapular Retraction  - 1-2 x daily - 1 sets - 10 reps - 5 hold    Patient Education  - Ice  "

## 2025-03-20 ENCOUNTER — OFFICE VISIT (OUTPATIENT)
Dept: PHYSICAL THERAPY | Facility: REHABILITATION | Age: 70
End: 2025-03-20
Payer: MEDICARE

## 2025-03-20 DIAGNOSIS — M25.512 CHRONIC LEFT SHOULDER PAIN: Primary | ICD-10-CM

## 2025-03-20 DIAGNOSIS — G89.29 CHRONIC LEFT SHOULDER PAIN: Primary | ICD-10-CM

## 2025-03-20 PROCEDURE — 97112 NEUROMUSCULAR REEDUCATION: CPT

## 2025-03-20 PROCEDURE — 97140 MANUAL THERAPY 1/> REGIONS: CPT

## 2025-03-20 PROCEDURE — 97110 THERAPEUTIC EXERCISES: CPT

## 2025-03-20 NOTE — ASSESSMENT & PLAN NOTE
SIRS criteria: Tachycardia, tachypnea  Suspected source: RSV Bronchitis.   Suspect likely due to RSV, monitor off antibiotics  Follow-up with blood cultures  Procal neg x 3  Resolved     No

## 2025-03-20 NOTE — PROGRESS NOTES
Daily Note     Today's date: 3/20/2025  Patient name: Markie Barrios  : 1955  MRN: [id number]  Referring provider: Michael Huitron*  Dx:   Encounter Diagnosis     ICD-10-CM    1. Chronic left shoulder pain  M25.512     G89.29                      Subjective: Pt reports that his shoulder continues to feel a little better each day and is compliant with HEP. He notes that the H/L pec/biceps stretch has not been comfortable at home.    Objective: See treatment diary below  Reviewed pec/biceps stretch for home and corrected form to ensure better stretch without pain with good pt carryover. Issued updated HEP to which pt demonstrated and verbalized understanding.    Assessment: Pt with good tolerance to treatment and manual techniques performed on this date. Progressed program as listed below, providing cues as needed to ensure proper technique with good carryover. Pt denies adverse response post treatment and would benefit from continued skilled PT to improve current deficits and maximize function.    Plan: Continue per plan of care.  Progress treatment as tolerated.       Precautions:   Patient Active Problem List   Diagnosis    Abnormal electromyogram    Benign essential hypertension    BPH (benign prostatic hyperplasia)    DDD (degenerative disc disease), lumbosacral    Diastasis recti    Impaired fasting glucose    Other male erectile dysfunction    Low HDL (under 40)    Snoring    Spinal stenosis of lumbar region    Bilateral primary osteoarthritis of knee    Hypovolemia    Hyponatremia    Obesity, morbid (HCC)     Daily Treatment Diary      Visit # 1 2 3 4 5 6 7 8 9 10   Assessment 3/4  3/6  3/11  3/13  3/18  3/20           Dewey/Donavon LARIOS                 **   FOTO  53/67       **  performed       **   Manuals    L GH Mobs: Dist, Inf, Post    SE  SE SE  SE  SE            L Shoulder PROM  SE SE SE SE np                    Prescribed POC    Pt Education  MD                      HEP Issued/Updated   "MD    SE                  Pec/Biceps Stretch with MHP To Begin  5' pre-tx 5' 5' 5' 5'        Pulleys with L Shoulder MHP    5\"x5 min  5\"x5 min  5\"x5 min  5\"x5 min  5\"x5 min  10\"x5 min          Table Slides: Flexion & Scaption    5\"x10  R UE assist  5\"x10 each  5\"x10 ea  5\"x15 ea  5\"  2x10 ea            Table ER Stretch  10\"x3 10\"x3 10\"x5 10\"x6 10\"x5        H/L Wand Press-Ups      5\"  1x10  5\"  1x15  5\"  2x10  5\"  2x10            H/L Wand Flexion      5\"  1x10  5\"  1x10  5\"  1x8  hold            H/L Pec Stretch    15\"x3  reviewed      15\"x4            Scap Retractions      5\"x10  5\"x10  5\"x15  5\"x20            Low Trap Retractions with B Shoulder ER         5\"x10  5\"x10            Pec/Biceps Stretch with CP To Conclude      10'  10'  10'  10'                                                          Modalities   MHP + Pec/Biceps Stretch    5' pre-tx  5' pre-tx  5' pre-tx  5' pre-tx  5' pre-tx           CP +Pec/Biceps Stretch  10' post-tx 10' post-tx 10' post-tx 10' post-tx 10' post-tx       QR Code:      Access Code: [email or web address]  URL: [email or web address]  Date: 03/20/2025  Prepared by: Sherry Mclean    Exercises  - Supine Single Arm Pectoralis Stretch  - 1-2 x daily - 31 sets - 3 reps - 15 hold  - Seated Shoulder Flexion Towel Slide at Table Top Full Range of Motion  - 1-2 x daily - 1 sets - 10 reps - 5 hold  - Seated Shoulder Scaption Slide at Table Top with Forearm in Neutral  - 1-2 x daily - 1 sets - 10 reps - 5 hold  - Seated Shoulder External Rotation PROM on Table  - 1-2 x daily - 1 sets - 3 reps - 10 hold  - Seated Scapular Retraction  - 1-2 x daily - 1 sets - 10 reps - 5 hold  - Shoulder External Rotation and Scapular Retraction  - 1 x daily - 1 sets - 10 reps - 5 hold    Patient Education  - Ice  "

## 2025-03-24 ENCOUNTER — TELEPHONE (OUTPATIENT)
Age: 70
End: 2025-03-24

## 2025-03-24 ENCOUNTER — PREP FOR PROCEDURE (OUTPATIENT)
Age: 70
End: 2025-03-24

## 2025-03-24 DIAGNOSIS — Z12.11 SCREENING FOR COLON CANCER: Primary | ICD-10-CM

## 2025-03-24 NOTE — TELEPHONE ENCOUNTER
Scheduled date of colonoscopy (as of today):12/10/2025  Physician performing colonoscopy:Dr Rosario  Location of colonoscopy:[facility]  Bowel prep reviewed with patient:Miralax/Dulcolax  Instructions reviewed with patient by:sent via letter  Clearances: N/A

## 2025-03-24 NOTE — TELEPHONE ENCOUNTER
03/24/25  Screened by: Phoebe Hager    Referring Provider Dr Medina    Pre- Screening:     There is no height or weight on file to calculate BMI.289lbs 37.13  Has patient been referred for a routine screening Colonoscopy? yes  Is the patient between 45-75 years old? yes      Previous Colonoscopy yes   If yes:    Date:     Facility:     Reason:     Does the patient want to see a Gastroenterologist prior to their procedure OR are they having any GI symptoms? no    Has the patient been hospitalized or had abdominal surgery in the past 6 months? no    Does the patient use supplemental oxygen? no    Does the patient take Coumadin, Lovenox, Plavix, Elliquis, Xarelto, or other blood thinning medication? no    Has the patient had a stroke, cardiac event, or stent placed in the past year? no        If patient is between 45yrs - 49yrs, please advise patient that we will have to confirm benefits & coverage with their insurance company for a routine screening colonoscopy.

## 2025-03-24 NOTE — LETTER
Attached are your prep instructions for your upcoming procedure on 12/10/2025. If you have any questions or concerns please contact us at [phone].                Thank you,      [facility]

## 2025-03-25 ENCOUNTER — OFFICE VISIT (OUTPATIENT)
Dept: PHYSICAL THERAPY | Facility: REHABILITATION | Age: 70
End: 2025-03-25
Payer: MEDICARE

## 2025-03-25 DIAGNOSIS — G89.29 CHRONIC LEFT SHOULDER PAIN: Primary | ICD-10-CM

## 2025-03-25 DIAGNOSIS — M25.512 CHRONIC LEFT SHOULDER PAIN: Primary | ICD-10-CM

## 2025-03-25 PROCEDURE — 97110 THERAPEUTIC EXERCISES: CPT

## 2025-03-25 PROCEDURE — 97140 MANUAL THERAPY 1/> REGIONS: CPT

## 2025-03-25 PROCEDURE — 97112 NEUROMUSCULAR REEDUCATION: CPT

## 2025-03-25 NOTE — PROGRESS NOTES
Daily Note     Today's date: 3/25/2025  Patient name: Markie Barrios  : 1955  MRN: [id number]  Referring provider: Michael Huitron*  Dx:   Encounter Diagnosis     ICD-10-CM    1. Chronic left shoulder pain  M25.512     G89.29                      Subjective: Pt with no new changes to report, states he continues to be compliant with HEP.    Objective: See treatment diary below  Reviewed pec/biceps stretch for home and corrected form to ensure better stretch without pain with good pt carryover.     Assessment: Pt with favorable response to manual techniques with improved mobility upon completion. Progressed program as listed below with cues provided as needed for technique and dosage with good carryover. Pt performed all exercises within pain-free ROM without adverse response reported. Pt would benefit from continued skilled PT to improve current deficits and maximize function.    Plan: Continue per plan of care.  Progress treatment as tolerated.       Precautions:   Patient Active Problem List   Diagnosis    Abnormal electromyogram    Benign essential hypertension    BPH (benign prostatic hyperplasia)    DDD (degenerative disc disease), lumbosacral    Diastasis recti    Impaired fasting glucose    Other male erectile dysfunction    Low HDL (under 40)    Snoring    Spinal stenosis of lumbar region    Bilateral primary osteoarthritis of knee    Hypovolemia    Hyponatremia    Obesity, morbid (HCC)     Daily Treatment Diary      Visit # 1 2 3 4 5 6 7 8 9 10   Assessment 3/4  3/6  3/11  3/13  3/18  3/20  3/25         Dewey/Donavon LARIOS                 **   FOTO  53/67       **  performed       **   Manuals    L GH Mobs: Dist, Inf, Post    SE  SE SE  SE  SE  SE          L Shoulder PROM  SE SE SE SE np                    Prescribed POC    Pt Education  MD                      HEP Issued/Updated  MD                      Pec/Biceps Stretch with MHP To Begin  5' pre-tx 5' 5' 5' 5' 5'       Pulleys with L  "Shoulder MHP    5\"x5 min  5\"x5 min  5\"x5 min  5\"x5 min  5\"x5 min  10\"x5 min          Table Slides: Flexion & Scaption    5\"x10  R UE assist  5\"x10 each  5\"x10 ea  5\"x15 ea  5\"  2x10 ea  5\"  2x10 ea          Table ER Stretch  10\"x3 10\"x3 10\"x5 10\"x6 10\"x5 20\"x3       H/L Wand Press-Ups      5\"  1x10  5\"  1x15  5\"  2x10  5\"  2x10  5\"  2x10          H/L Wand Flexion      5\"  1x10  5\"  1x10  5\"  1x8  hold            H/L Pec Stretch    15\"x3  reviewed      15\"x4  20\"x3          Scap Retractions      5\"x10  5\"x10  5\"x15  5\"x20  5\"x20          Low Trap Retractions with B Shoulder ER         5\"x10  5\"x10  5\"x10          Pec/Biceps Stretch with CP To Conclude      10'  10'  10'  10'  10'                                                        Modalities   MHP + Pec/Biceps Stretch    5' pre-tx  5' pre-tx  5' pre-tx  5' pre-tx  5' pre-tx  5' pre-tx         CP +Pec/Biceps Stretch  10' post-tx 10' post-tx 10' post-tx 10' post-tx 10' post-tx 10' post-tx      QR Code:      Access Code: [email or web address]  URL: [email or web address]  Date: 03/20/2025  Prepared by: Sherry Mclean    Exercises  - Supine Single Arm Pectoralis Stretch  - 1-2 x daily - 31 sets - 3 reps - 15 hold  - Seated Shoulder Flexion Towel Slide at Table Top Full Range of Motion  - 1-2 x daily - 1 sets - 10 reps - 5 hold  - Seated Shoulder Scaption Slide at Table Top with Forearm in Neutral  - 1-2 x daily - 1 sets - 10 reps - 5 hold  - Seated Shoulder External Rotation PROM on Table  - 1-2 x daily - 1 sets - 3 reps - 10 hold  - Seated Scapular Retraction  - 1-2 x daily - 1 sets - 10 reps - 5 hold  - Shoulder External Rotation and Scapular Retraction  - 1 x daily - 1 sets - 10 reps - 5 hold    Patient Education  - Ice  "

## 2025-03-27 ENCOUNTER — OFFICE VISIT (OUTPATIENT)
Dept: PHYSICAL THERAPY | Facility: REHABILITATION | Age: 70
End: 2025-03-27
Payer: MEDICARE

## 2025-03-27 DIAGNOSIS — G89.29 CHRONIC LEFT SHOULDER PAIN: Primary | ICD-10-CM

## 2025-03-27 DIAGNOSIS — M25.512 CHRONIC LEFT SHOULDER PAIN: Primary | ICD-10-CM

## 2025-03-27 PROCEDURE — 97112 NEUROMUSCULAR REEDUCATION: CPT

## 2025-03-27 PROCEDURE — 97140 MANUAL THERAPY 1/> REGIONS: CPT

## 2025-03-27 PROCEDURE — 97110 THERAPEUTIC EXERCISES: CPT

## 2025-03-27 NOTE — PROGRESS NOTES
"Daily Note     Today's date: 3/27/2025  Patient name: Markie Barrios  : 1955  MRN: [id number]  Referring provider: Michael Huitron*  Dx:   Encounter Diagnosis     ICD-10-CM    1. Chronic left shoulder pain  M25.512     G89.29                      Subjective: Pt reports his L shoulder feels about the same. He was able to complete pec/biceps stretch with more favorable outcome at home.    Objective: See treatment diary below    Assessment: Pt with good tolerance to treatment and progressions to reps and hold times for stretches as listed below. Demonstrates good understanding of current program and performs with good carryover, mild cues provided as needed. Pt denies adverse response post treatment and would benefit from continued skilled PT to improve current deficits and maximize function.    Plan: Continue per plan of care.  Progress treatment as tolerated.       Precautions:   Patient Active Problem List   Diagnosis    Abnormal electromyogram    Benign essential hypertension    BPH (benign prostatic hyperplasia)    DDD (degenerative disc disease), lumbosacral    Diastasis recti    Impaired fasting glucose    Other male erectile dysfunction    Low HDL (under 40)    Snoring    Spinal stenosis of lumbar region    Bilateral primary osteoarthritis of knee    Hypovolemia    Hyponatremia    Obesity, morbid (HCC)     Daily Treatment Diary      Visit # 1 2 3 4 5 6 7 8 9 10   Assessment 3/4  3/6  3/11  3/13  3/18  3/20  3/25  3/27       Dewey/Donavon LARIOS                 **   FOTO  53/67       **  performed       **   Manuals    L GH Mobs: Dist, Inf, Post    SE  SE SE  SE  SE  SE  SE        L Shoulder PROM  SE SE SE SE np                    Prescribed POC    Pt Education  MD                      HEP Issued/Updated  MD    SE                  Pec/Biceps Stretch with MHP To Begin  5' pre-tx 5' 5' 5' 5' 5' 5'      Pulleys with L Shoulder MHP    5\"x5 min  5\"x5 min  5\"x5 min  5\"x5 min  5\"x5 min  10\"x5 min  10\"x5 " "min        Table Slides: Flexion & Scaption    5\"x10  R UE assist  5\"x10 each  5\"x10 ea  5\"x15 ea  5\"  2x10 ea  5\"  2x10 ea  5\"  2x10 ea        Table ER Stretch  10\"x3 10\"x3 10\"x5 10\"x6 10\"x5 20\"x3 20\"x4      H/L Wand Press-Ups      5\"  1x10  5\"  1x15  5\"  2x10  5\"  2x10  5\"  2x10  5\"  2x10        H/L Wand Flexion      5\"  1x10  5\"  1x10  5\"  1x8  hold            H/L Pec Stretch    15\"x3  reviewed      15\"x4  20\"x3  HEP        Scap Retractions      5\"x10  5\"x10  5\"x15  5\"x20  5\"x20  5\"x30        Low Trap Retractions with B Shoulder ER         5\"x10  5\"x10  5\"x10          Pec/Biceps Stretch with CP To Conclude      10'  10'  10'  10'  10'                                                        Modalities   MHP + Pec/Biceps Stretch    5' pre-tx  5' pre-tx  5' pre-tx  5' pre-tx  5' pre-tx  5' pre-tx  5' pre-tx       CP +Pec/Biceps Stretch  10' post-tx 10' post-tx 10' post-tx 10' post-tx 10' post-tx 10' post-tx 5' post-tx     QR Code:      Access Code: [email or web address]  URL: [email or web address]  Date: 03/20/2025  Prepared by: Sherry Mclean    Exercises  - Supine Single Arm Pectoralis Stretch  - 1-2 x daily - 31 sets - 3 reps - 15 hold  - Seated Shoulder Flexion Towel Slide at Table Top Full Range of Motion  - 1-2 x daily - 1 sets - 10 reps - 5 hold  - Seated Shoulder Scaption Slide at Table Top with Forearm in Neutral  - 1-2 x daily - 1 sets - 10 reps - 5 hold  - Seated Shoulder External Rotation PROM on Table  - 1-2 x daily - 1 sets - 3 reps - 10 hold  - Seated Scapular Retraction  - 1-2 x daily - 1 sets - 10 reps - 5 hold  - Shoulder External Rotation and Scapular Retraction  - 1 x daily - 1 sets - 10 reps - 5 hold    Patient Education  - Ice  "

## 2025-04-01 ENCOUNTER — OFFICE VISIT (OUTPATIENT)
Dept: PHYSICAL THERAPY | Facility: REHABILITATION | Age: 70
End: 2025-04-01
Payer: MEDICARE

## 2025-04-01 DIAGNOSIS — G89.29 CHRONIC LEFT SHOULDER PAIN: Primary | ICD-10-CM

## 2025-04-01 DIAGNOSIS — M25.512 CHRONIC LEFT SHOULDER PAIN: Primary | ICD-10-CM

## 2025-04-01 PROCEDURE — 97110 THERAPEUTIC EXERCISES: CPT

## 2025-04-01 PROCEDURE — 97140 MANUAL THERAPY 1/> REGIONS: CPT

## 2025-04-01 NOTE — PROGRESS NOTES
"Daily Note     Today's date: 2025  Patient name: Markie Barrios  : 1955  MRN: [id number]  Referring provider: Michael Huitron*  Dx:   Encounter Diagnosis     ICD-10-CM    1. Chronic left shoulder pain  M25.512     G89.29             Start Time: 1300  Stop Time: 1343  Total time in clinic (min): 43 minutes    Subjective: Pt reports his shoulder feels about the same. States he is having no issues with HEP.    Objective: See treatment diary below    Assessment: Pt with good tolerance to treatment today. Tightness noted with manuals. Continued strength and ROM per plan. Pt denies adverse response post treatment and would benefit from continued skilled PT to improve current deficits and maximize function.    Plan: Continue per plan of care.  Progress treatment as tolerated.       Precautions:   Patient Active Problem List   Diagnosis    Abnormal electromyogram    Benign essential hypertension    BPH (benign prostatic hyperplasia)    DDD (degenerative disc disease), lumbosacral    Diastasis recti    Impaired fasting glucose    Other male erectile dysfunction    Low HDL (under 40)    Snoring    Spinal stenosis of lumbar region    Bilateral primary osteoarthritis of knee    Hypovolemia    Hyponatremia    Obesity, morbid (HCC)     Daily Treatment Diary      Visit # 1 2 3 4 5 6 7 8 9 10   Assessment 3/4  3/6  3/11  3/13  3/18  3/20  3/25  3/27  4/1     Dewey/Donavon LARIOS                 **   FOTO  53/67       **  performed       **   Manuals    L GH Mobs: Dist, Inf, Post    SE  SE SE  SE  SE  SE  SE  MDD       L Shoulder PROM  SE SE SE SE np                    Prescribed POC    Pt Education  MD                      HEP Issued/Updated  MD    SE                  Pec/Biceps Stretch with MHP To Begin  5' pre-tx 5' 5' 5' 5' 5' 5' 5'      Pulleys with L Shoulder MHP    5\"x5 min  5\"x5 min  5\"x5 min  5\"x5 min  5\"x5 min  10\"x5 min  10\"x5 min  10\"x5 min      Table Slides: Flexion & Scaption    5\"x10  R UE assist  " "5\"x10 each  5\"x10 ea  5\"x15 ea  5\"  2x10 ea  5\"  2x10 ea  5\"  2x10 ea  5\"  2x10 ea      Table ER Stretch  10\"x3 10\"x3 10\"x5 10\"x6 10\"x5 20\"x3 20\"x4 20\"x4     H/L Wand Press-Ups      5\"  1x10  5\"  1x15  5\"  2x10  5\"  2x10  5\"  2x10  5\"  2x10  5\"  2x10      H/L Wand Flexion      5\"  1x10  5\"  1x10  5\"  1x8  hold            H/L Pec Stretch    15\"x3  reviewed      15\"x4  20\"x3  HEP        Scap Retractions      5\"x10  5\"x10  5\"x15  5\"x20  5\"x20  5\"x30        Low Trap Retractions with B Shoulder ER         5\"x10  5\"x10  5\"x10          Pec/Biceps Stretch with CP To Conclude      10'  10'  10'  10'  10'    5' post-tx                                                    Modalities   MHP + Pec/Biceps Stretch    5' pre-tx  5' pre-tx  5' pre-tx  5' pre-tx  5' pre-tx  5' pre-tx  5' pre-tx  5' pre-tx     CP +Pec/Biceps Stretch  10' post-tx 10' post-tx 10' post-tx 10' post-tx 10' post-tx 10' post-tx 5' post-tx 5' post-tx    QR Code:      Access Code: [email or web address]  URL: [email or web address]  Date: 03/20/2025  Prepared by: Sherry Mclean    Exercises  - Supine Single Arm Pectoralis Stretch  - 1-2 x daily - 31 sets - 3 reps - 15 hold  - Seated Shoulder Flexion Towel Slide at Table Top Full Range of Motion  - 1-2 x daily - 1 sets - 10 reps - 5 hold  - Seated Shoulder Scaption Slide at Table Top with Forearm in Neutral  - 1-2 x daily - 1 sets - 10 reps - 5 hold  - Seated Shoulder External Rotation PROM on Table  - 1-2 x daily - 1 sets - 3 reps - 10 hold  - Seated Scapular Retraction  - 1-2 x daily - 1 sets - 10 reps - 5 hold  - Shoulder External Rotation and Scapular Retraction  - 1 x daily - 1 sets - 10 reps - 5 hold    Patient Education  - Ice  "

## 2025-04-02 ENCOUNTER — OFFICE VISIT (OUTPATIENT)
Dept: OBGYN CLINIC | Facility: OTHER | Age: 70
End: 2025-04-02
Payer: MEDICARE

## 2025-04-02 VITALS — HEIGHT: 74 IN | WEIGHT: 289 LBS | BODY MASS INDEX: 37.09 KG/M2

## 2025-04-02 DIAGNOSIS — G89.29 CHRONIC LEFT SHOULDER PAIN: ICD-10-CM

## 2025-04-02 DIAGNOSIS — M25.512 CHRONIC LEFT SHOULDER PAIN: ICD-10-CM

## 2025-04-02 DIAGNOSIS — M19.012 ARTHRITIS OF LEFT SHOULDER REGION: ICD-10-CM

## 2025-04-02 PROCEDURE — 99204 OFFICE O/P NEW MOD 45 MIN: CPT | Performed by: ORTHOPAEDIC SURGERY

## 2025-04-02 NOTE — PROGRESS NOTES
Orthopaedic Surgery - Office Note  Markie Barrios (69 y.o. male)   : 1955   MRN: [id number]  Encounter Date: 2025    Assessment / Plan    Left shoulder glenohumeral osteoarthritis  We did have a long discussion about treatment options for osteoarthritis of the shoulder.  This included conservative versus surgical treatment.  He does understand that surgical treatment would be a shoulder replacement.  He would like to continue with conservative treatment at this time as long as it is working.  Continue with modalities such as ice and heat and anti-inflammatories as needed.  Continue with physical therapy with progression to home program.  We did discuss the option for injections going forward if needed.  Did review that if he does have a steroid injection it would be a 3-month wait between the injection and surgery.  Follow-up:  Return in about 3 months (around 2025) for follow up with Dr. Flood. Can follow up with Justin for injections.      Chief Complaint / Date of Onset  Left shoulder pain worsening over the last 3 months.  Injury Mechanism / Date  None  Surgery / Date  None    History of Present Illness   Markie Barrios is a 69 y.o. male who presents for evaluation of the left shoulder which has been bothering him for approximately 3 months.  Patient is R handed. He states that before this he did have some soreness but it worsened over the last few months.  He did start physical therapy which she feels has improved some of his pain.  He does notice frequent crepitus/crunching in his shoulder with movement which is not always painful but uncomfortable.  He occasionally takes over-the-counter Tylenol or Advil if needed.  He feels that his pain is worse at nighttime.  He denies any history of injury to his shoulder.  He did have x-rays taken through his PCP that showed severe glenohumeral arthritis.    Treatment Summary  Medications / Modalities  Over-the-counter Advil as needed  Bracing  "/ Immobilization  None  Physical Therapy  Regularly over the last month  Injections  None  Prior Surgeries  R TKR 1 year ago with [facility]  Other Treatments  None    Employment / Current Status  Retired        Review of Systems  Pertinent items are noted in HPI.  All other systems were reviewed and are negative.      Physical Exam  Ht 6' 2\" (1.88 m)   Wt 131 kg (289 lb)   BMI 37.11 kg/m²   Cons: Appears well.  No apparent distress.  Psych: Alert. Oriented x3.  Mood and affect normal.  Eyes: PERRLA, EOMI  Resp: Normal effort.  No audible wheezing or stridor.  CV: Palpable pulse.  No discernable arrhythmia.  No LE edema.  Lymph:  No palpable cervical, axillary, or inguinal lymphadenopathy.  Skin: Warm.  No palpable masses.  No visible lesions.  Neuro: Normal muscle tone.  Normal and symmetric DTR's.     Left Shoulder Exam  Alignment / Posture:  Normal cervical alignment. Normal shoulder posture. Normal scapular position.  Inspection:  No swelling. No erythema. No ecchymosis. No deformity.  Palpation:   Mild tenderness at lateral aspect of the shoulder over the subacromial space.  Palpable crepitus with range of motion..  ROM:  Shoulder . Shoulder ER 45. Shoulder IR L2.  Strength:  5/5 supraspinatus, infraspinatus, and subscapularis.  Stability:  No objective shoulder instability.  Tests: (+) Speed. (+) Mills. (-) Valdez. (-) Neer. (-) Spurling.  Neurovascular:  Sensation intact in Ax/R/M/U nerve distributions. Sensation intact in all digital nerve distributions. Fingers warm and perfused.       Studies Reviewed  I have personally reviewed pertinent films in PACS.  XR of left shoulder - from 3/4/2025 shows severe osteoarthritic degenerative change of the left glenohumeral joint with mild AC joint arthritic changes.  Subchondral sclerosis and spurring seen in the glenohumeral joint.      Procedures  No procedures today.    Medical, Surgical, Family, and Social History  The patient's medical history, " family history, and social history, were reviewed and updated as appropriate.    Past Medical History:   Diagnosis Date    BPH (benign prostatic hyperplasia)     Dyslipidemia     Hypertension     Impaired fasting glucose     Osteoarthritis of both knees        Past Surgical History:   Procedure Laterality Date    COLONOSCOPY  11/23/2020    tubular adenoma; Dr Ross    REPLACEMENT TOTAL KNEE Right        Family History   Problem Relation Age of Onset    Hypertension Mother     Kidney disease Mother     No Known Problems Sister     No Known Problems Brother     No Known Problems Brother     No Known Problems Son     No Known Problems Son     No Known Problems Daughter        Social History     Occupational History    Occupation: retired   Tobacco Use    Smoking status: Never     Passive exposure: Never    Smokeless tobacco: Never   Vaping Use    Vaping status: Never Used   Substance and Sexual Activity    Alcohol use: Not Currently     Comment: occasional    Drug use: Never    Sexual activity: Not Currently       No Known Allergies      Current Outpatient Medications:     enalapril (VASOTEC) 20 mg tablet, TAKE 1 TABLET TWICE A DAY, Disp: 180 tablet, Rfl: 1    finasteride (PROSCAR) 5 mg tablet, take 1 tablet by mouth once daily, Disp: 30 tablet, Rfl: 5    hydroCHLOROthiazide 25 mg tablet, TAKE 1 TABLET DAILY, Disp: 90 tablet, Rfl: 1    tamsulosin (FLOMAX) 0.4 mg, take 3 capsules by mouth once daily with DINNER, Disp: 270 capsule, Rfl: 1    sildenafil (VIAGRA) 50 MG tablet, Take 1 tablet (50 mg total) by mouth daily as needed for erectile dysfunction (Patient not taking: Reported on 10/8/2024), Disp: 10 tablet, Rfl: 3      Justin Chong PA-C    Scribe Attestation      I,:   am acting as a scribe while in the presence of the attending physician.:       I,:   personally performed the services described in this documentation    as scribed in my presence.:

## 2025-04-03 ENCOUNTER — OFFICE VISIT (OUTPATIENT)
Dept: PHYSICAL THERAPY | Facility: REHABILITATION | Age: 70
End: 2025-04-03
Payer: MEDICARE

## 2025-04-03 DIAGNOSIS — M25.512 CHRONIC LEFT SHOULDER PAIN: Primary | ICD-10-CM

## 2025-04-03 DIAGNOSIS — G89.29 CHRONIC LEFT SHOULDER PAIN: Primary | ICD-10-CM

## 2025-04-03 PROCEDURE — 97140 MANUAL THERAPY 1/> REGIONS: CPT

## 2025-04-03 PROCEDURE — 97112 NEUROMUSCULAR REEDUCATION: CPT

## 2025-04-03 PROCEDURE — 97110 THERAPEUTIC EXERCISES: CPT

## 2025-04-03 NOTE — PROGRESS NOTES
Daily Note     Today's date: 4/3/2025  Patient name: Markie Barrios  : 1955  MRN: [id number]  Referring provider: Michael Huitron*  Dx:   Encounter Diagnosis     ICD-10-CM    1. Chronic left shoulder pain  M25.512     G89.29                        Subjective: Pt reports he saw Dr. Flood and was presented with surgical and conservative options for his L shoulder. He would like to remain in PT as he does note overall improvement since beginning PT. He is scheduled for another f/u in 3 months at which time he may consider CSI.    He further states he was able to sleep all night the past two nights. He notes overall improvement in pain levels and ROM, however notes that he has remained somewhat conservative and protective of it with ADLs.    Objective: See treatment diary below    Assessment: Pt with good tolerance to treatment. Able to add supine wand flexion back in, completed to tolerance in pain-free ROM. Added prone scapular strengthening with good tolerance, moderate MC/VC to complete. Pt denies adverse response and would benefit from continued skilled PT to improve current deficits and maximize function.    Plan: Continue per plan of care.  Progress treatment as tolerated.       Precautions:   Patient Active Problem List   Diagnosis    Abnormal electromyogram    Benign essential hypertension    BPH (benign prostatic hyperplasia)    DDD (degenerative disc disease), lumbosacral    Diastasis recti    Impaired fasting glucose    Other male erectile dysfunction    Low HDL (under 40)    Snoring    Spinal stenosis of lumbar region    Bilateral primary osteoarthritis of knee    Hypovolemia    Hyponatremia    Obesity, morbid (HCC)     Daily Treatment Diary      Visit # 1 2 3 4 5 6 7 8 9 10   Assessment 3/4  3/6  3/11  3/13  3/18  3/20  3/25  3/27  4/1  4/3   Dewey/Donavon LARIOS                 **   FOTO  /       **  performed       **NV   Manuals    L GH Mobs: Dist, Inf, Post    SE  SE SE  SE  SE  SE  " SE  MDD   SE    L Shoulder PROM  SE SE SE SE np                    Prescribed POC    Pt Education  MD                      HEP Issued/Updated  MD    SE                  Pec/Biceps Stretch with MHP To Begin  5' pre-tx 5' 5' 5' 5' 5' 5' 5'  5'    Pulleys with L Shoulder MHP    5\"x5 min  5\"x5 min  5\"x5 min  5\"x5 min  5\"x5 min  10\"x5 min  10\"x5 min  10\"x5 min  10\"x5 min    Table Slides: Flexion & Scaption    5\"x10  R UE assist  5\"x10 each  5\"x10 ea  5\"x15 ea  5\"  2x10 ea  5\"  2x10 ea  5\"  2x10 ea  5\"  2x10 ea  5\"  2x10 ea    Table ER Stretch  10\"x3 10\"x3 10\"x5 10\"x6 10\"x5 20\"x3 20\"x4 20\"x4 20\"x4    H/L Wand Press-Ups      5\"  1x10  5\"  1x15  5\"  2x10  5\"  2x10  5\"  2x10  5\"  2x10  5\"  2x10  5\"   3x10    H/L Wand Flexion      5\"  1x10  5\"  1x10  5\"  1x8  hold        5\"  1x10    H/L Pec Stretch    15\"x3  reviewed      15\"x4  20\"x3  HEP        Scap Retractions      5\"x10  5\"x10  5\"x15  5\"x20  5\"x20  5\"x30    @ home today    Low Trap Retractions with B Shoulder ER         5\"x10  5\"x10  5\"x10          Pec/Biceps Stretch with CP To Conclude      10'  10'  10'  10'  10'    5' post-tx                         prone scap retraction  5\"x5                           Modalities   MHP + Pec/Biceps Stretch    5' pre-tx  5' pre-tx  5' pre-tx  5' pre-tx  5' pre-tx  5' pre-tx  5' pre-tx  5' pre-tx  5' pre-tx   CP +Pec/Biceps Stretch  10' post-tx 10' post-tx 10' post-tx 10' post-tx 10' post-tx 10' post-tx 5' post-tx 5' post-tx 5' post-tx   QR Code:      Access Code: [email or web address]  URL: [email or web address]  Date: 03/20/2025  Prepared by: Sherry Mclean    Exercises  - Supine Single Arm Pectoralis Stretch  - 1-2 x daily - 31 sets - 3 reps - 15 hold  - Seated Shoulder Flexion Towel Slide at Table Top Full Range of Motion  - 1-2 x daily - 1 sets - 10 reps - 5 hold  - Seated Shoulder Scaption Slide at Table Top with Forearm in Neutral  - 1-2 x daily - 1 sets - 10 reps - 5 hold  - Seated Shoulder External Rotation PROM on Table  - 1-2 " x daily - 1 sets - 3 reps - 10 hold  - Seated Scapular Retraction  - 1-2 x daily - 1 sets - 10 reps - 5 hold  - Shoulder External Rotation and Scapular Retraction  - 1 x daily - 1 sets - 10 reps - 5 hold    Patient Education  - Ice

## 2025-04-08 ENCOUNTER — OFFICE VISIT (OUTPATIENT)
Dept: PHYSICAL THERAPY | Facility: REHABILITATION | Age: 70
End: 2025-04-08
Payer: MEDICARE

## 2025-04-08 DIAGNOSIS — G89.29 CHRONIC LEFT SHOULDER PAIN: Primary | ICD-10-CM

## 2025-04-08 DIAGNOSIS — M25.512 CHRONIC LEFT SHOULDER PAIN: Primary | ICD-10-CM

## 2025-04-08 PROCEDURE — 97112 NEUROMUSCULAR REEDUCATION: CPT

## 2025-04-08 PROCEDURE — 97140 MANUAL THERAPY 1/> REGIONS: CPT

## 2025-04-08 PROCEDURE — 97110 THERAPEUTIC EXERCISES: CPT

## 2025-04-08 NOTE — PROGRESS NOTES
Daily Note     Today's date: 2025  Patient name: Markie Barrios  : 1955  MRN: [id number]  Referring provider: Michael Huitron*  Dx:   Encounter Diagnosis     ICD-10-CM    1. Chronic left shoulder pain  M25.512     G89.29                        Subjective: Pt reports his shoulder continues to feel good. He went the entire day on  without it bothering him. He is consistent with HEP every day.    Objective: See treatment diary below  Issued updated HEP to which pt demonstrates and verbalizes understanding.    Assessment: Pt with good tolerance to treatment and progression of program to include OTB for B scap retraction+ER, increased reps of prone scap retractions. Moderate cues for performance and technique with prone scap retractions. Reviewed exercises in current program and HEP with good understanding. Pt denies adverse response post treatment and would benefit from continued skilled PT to improve current deficits and maximize function.    Plan: Continue per plan of care.  Progress treatment as tolerated.       Precautions:   Patient Active Problem List   Diagnosis    Abnormal electromyogram    Benign essential hypertension    BPH (benign prostatic hyperplasia)    DDD (degenerative disc disease), lumbosacral    Diastasis recti    Impaired fasting glucose    Other male erectile dysfunction    Low HDL (under 40)    Snoring    Spinal stenosis of lumbar region    Bilateral primary osteoarthritis of knee    Hypovolemia    Hyponatremia    Obesity, morbid (HCC)     Daily Treatment Diary      Visit # 11 2 3 4 5 6 7 8 9 10   Assessment 4/8  3/6  3/11  3/13  3/18  3/20  3/25  3/27  4/1  4/3   Eval/Reval                  **   FOTO        **  performed       **NV   Manuals    L GH Mobs: Dist, Inf, Post  SE  SE  SE SE  SE  SE  SE  SE  MDD   SE    L Shoulder PROM  SE SE SE SE np                    Prescribed POC    Pt Education                       HEP Issued/Updated SE    SE                   "Pec/Biceps Stretch with MHP To Begin 5' 5' pre-tx 5' 5' 5' 5' 5' 5' 5'  5'    Pulleys with L Shoulder MHP  10\"x5 min  5\"x5 min  5\"x5 min  5\"x5 min  5\"x5 min  5\"x5 min  10\"x5 min  10\"x5 min  10\"x5 min  10\"x5 min    Table Slides: Flexion & Scaption  5\"  2x10 ea  5\"x10  R UE assist  5\"x10 each  5\"x10 ea  5\"x15 ea  5\"  2x10 ea  5\"  2x10 ea  5\"  2x10 ea  5\"  2x10 ea  5\"  2x10 ea    Table ER Stretch 20\"x4 10\"x3 10\"x3 10\"x5 10\"x6 10\"x5 20\"x3 20\"x4 20\"x4 20\"x4    H/L Wand Press-Ups  2#  5\"  2x10    5\"  1x10  5\"  1x15  5\"  2x10  5\"  2x10  5\"  2x10  5\"  2x10  5\"  2x10  5\"   3x10    H/L Wand Flexion  5\"  1x10    5\"  1x10  5\"  1x10  5\"  1x8  hold        5\"  1x10    H/L Pec Stretch    15\"x3  reviewed      15\"x4  20\"x3  HEP        Scap Retractions      5\"x10  5\"x10  5\"x15  5\"x20  5\"x20  5\"x30    @ home today    Low Trap Retractions with B Shoulder ER  OTB  5\"  1x10       5\"x10  5\"x10  5\"x10          Pec/Biceps Stretch with CP To Conclude  5'    10'  10'  10'  10'  10'    5' post-tx       prone scap retractions  5\"x10                  prone scap retraction  5\"x5                           Modalities   MHP + Pec/Biceps Stretch  5'  5' pre-tx  5' pre-tx  5' pre-tx  5' pre-tx  5' pre-tx  5' pre-tx  5' pre-tx  5' pre-tx  5' pre-tx   CP +Pec/Biceps Stretch 5' 10' post-tx 10' post-tx 10' post-tx 10' post-tx 10' post-tx 10' post-tx 5' post-tx 5' post-tx 5' post-tx   QR Code:      Access Code: [email or web address]  URL: [email or web address]  Date: 04/08/2025  Prepared by: Sherry Mclean    Exercises  - Supine Single Arm Pectoralis Stretch  - 1-2 x daily - 31 sets - 3 reps - 15 hold  - Seated Shoulder Flexion Towel Slide at Table Top Full Range of Motion  - 1-2 x daily - 1 sets - 10 reps - 5 hold  - Seated Shoulder Scaption Slide at Table Top with Forearm in Neutral  - 1-2 x daily - 1 sets - 10 reps - 5 hold  - Seated Shoulder External Rotation PROM on Table  - 1-2 x daily - 1 sets - 3 reps - 10 hold  - Seated Scapular Retraction  - 1-2 x " daily - 1 sets - 10 reps - 5 hold  - Shoulder External Rotation and Scapular Retraction  - 1 x daily - 1 sets - 10 reps - 5 hold  - Supine Shoulder Flexion with Dowel  - 1 x daily - 1 sets - 10 reps - 5 hold  - Supine Shoulder Press AAROM in Abduction with Dowel  - 1 x daily - 2 sets - 10 reps - 5 hold    Patient Education  - Ice

## 2025-04-10 ENCOUNTER — OFFICE VISIT (OUTPATIENT)
Dept: PHYSICAL THERAPY | Facility: REHABILITATION | Age: 70
End: 2025-04-10
Payer: MEDICARE

## 2025-04-10 DIAGNOSIS — M25.512 CHRONIC LEFT SHOULDER PAIN: Primary | ICD-10-CM

## 2025-04-10 DIAGNOSIS — G89.29 CHRONIC LEFT SHOULDER PAIN: Primary | ICD-10-CM

## 2025-04-10 PROCEDURE — 97110 THERAPEUTIC EXERCISES: CPT

## 2025-04-10 PROCEDURE — 97112 NEUROMUSCULAR REEDUCATION: CPT

## 2025-04-10 NOTE — PROGRESS NOTES
"Daily Note     Today's date: 4/10/2025  Patient name: Markie Barrios  : 1955  MRN: [id number]  Referring provider: Michael Huitron*  Dx:   Encounter Diagnosis     ICD-10-CM    1. Chronic left shoulder pain  M25.512     G89.29                        Subjective: Pt reports his shoulder has been bothering him more than usual yesterday and today.    Objective: See treatment diary below    Assessment: Pt with good tolerance to treatment. Added S/L ER in pain free range with good tolerance and no adverse response. PT continues to demonstrate gradually improving ROM with completion of exercises and manual techniques. Will continue to progress as able. Pt would benefit from continued skilled PT to improve current deficits and maximize function.    Plan: Continue per plan of care.  Progress treatment as tolerated.       Precautions:   Patient Active Problem List   Diagnosis    Abnormal electromyogram    Benign essential hypertension    BPH (benign prostatic hyperplasia)    DDD (degenerative disc disease), lumbosacral    Diastasis recti    Impaired fasting glucose    Other male erectile dysfunction    Low HDL (under 40)    Snoring    Spinal stenosis of lumbar region    Bilateral primary osteoarthritis of knee    Hypovolemia    Hyponatremia    Obesity, morbid (HCC)     Daily Treatment Diary      Visit # 11 12 3 4 5 6 7 8 9 10   Assessment 4/8  4/10  3/11  3/13  3/18  3/20  3/25  3/27  4/1  4/3   Eval/Reval                  **   FOTO        **  performed       **NV   Manuals    L GH Mobs: Dist, Inf, Post  SE  SE  SE SE  SE  SE  SE  SE  MDD   SE    L Shoulder PROM   SE SE SE np                    Prescribed POC    Pt Education                       HEP Issued/Updated SE    SE                  Pec/Biceps Stretch with MHP To Begin 5' 5'  5' 5' 5' 5' 5' 5' 5'  5'    Pulleys with L Shoulder MHP  10\"x5 min 10\"x5 min  5\"x5 min  5\"x5 min  5\"x5 min  5\"x5 min  10\"x5 min  10\"x5 min  10\"x5 min  10\"x5 min    Table " "Slides: Flexion & Scaption  5\"  2x10 ea Table elevated to 25*  5\"x10 ea  5\"x10 each  5\"x10 ea  5\"x15 ea  5\"  2x10 ea  5\"  2x10 ea  5\"  2x10 ea  5\"  2x10 ea  5\"  2x10 ea    Table ER Stretch 20\"x4 20\"x4 10\"x3 10\"x5 10\"x6 10\"x5 20\"x3 20\"x4 20\"x4 20\"x4    H/L Wand Press-Ups  2#  5\"  2x10 2#  5\"  2x10  5\"  1x10  5\"  1x15  5\"  2x10  5\"  2x10  5\"  2x10  5\"  2x10  5\"  2x10  5\"   3x10    H/L Wand Flexion  5\"  1x10 5\"  1x15  5\"  1x10  5\"  1x10  5\"  1x8  hold        5\"  1x10    H/L Pec Stretch     reviewed      15\"x4  20\"x3  HEP        Scap Retractions   5\"x30  5\"x10  5\"x10  5\"x15  5\"x20  5\"x20  5\"x30    @ home today    Low Trap Retractions with B Shoulder ER  OTB  5\"  1x10  OTB  5\"  1x10     5\"x10  5\"x10  5\"x10          Pec/Biceps Stretch with CP To Conclude  5'  5'  10'  10'  10'  10'  10'    5' post-tx       prone scap retractions  5\"x10  prone scap retractions  5\"x10                prone scap retraction  5\"x5        S/L ER AROM  5\"x10                   Modalities   MHP + Pec/Biceps Stretch  5'  5' pre-tx  5' pre-tx  5' pre-tx  5' pre-tx  5' pre-tx  5' pre-tx  5' pre-tx  5' pre-tx  5' pre-tx   CP +Pec/Biceps Stretch 5' 10' post-tx 10' post-tx 10' post-tx 10' post-tx 10' post-tx 10' post-tx 5' post-tx 5' post-tx 5' post-tx   QR Code:      Access Code: [email or web address]  URL: [email or web address]  Date: 04/08/2025  Prepared by: Sherry Mclean    Exercises  - Supine Single Arm Pectoralis Stretch  - 1-2 x daily - 31 sets - 3 reps - 15 hold  - Seated Shoulder Flexion Towel Slide at Table Top Full Range of Motion  - 1-2 x daily - 1 sets - 10 reps - 5 hold  - Seated Shoulder Scaption Slide at Table Top with Forearm in Neutral  - 1-2 x daily - 1 sets - 10 reps - 5 hold  - Seated Shoulder External Rotation PROM on Table  - 1-2 x daily - 1 sets - 3 reps - 10 hold  - Seated Scapular Retraction  - 1-2 x daily - 1 sets - 10 reps - 5 hold  - Shoulder External Rotation and Scapular Retraction  - 1 x daily - 1 sets - 10 reps - 5 " hold  - Supine Shoulder Flexion with Dowel  - 1 x daily - 1 sets - 10 reps - 5 hold  - Supine Shoulder Press AAROM in Abduction with Dowel  - 1 x daily - 2 sets - 10 reps - 5 hold    Patient Education  - Ice

## 2025-04-15 ENCOUNTER — OFFICE VISIT (OUTPATIENT)
Dept: PHYSICAL THERAPY | Facility: REHABILITATION | Age: 70
End: 2025-04-15
Payer: MEDICARE

## 2025-04-15 DIAGNOSIS — G89.29 CHRONIC LEFT SHOULDER PAIN: Primary | ICD-10-CM

## 2025-04-15 DIAGNOSIS — M25.512 CHRONIC LEFT SHOULDER PAIN: Primary | ICD-10-CM

## 2025-04-15 PROCEDURE — 97110 THERAPEUTIC EXERCISES: CPT

## 2025-04-15 PROCEDURE — 97112 NEUROMUSCULAR REEDUCATION: CPT

## 2025-04-15 NOTE — PROGRESS NOTES
Daily Note     Today's date: 4/15/2025  Patient name: Markie Barrios  : 1955  MRN: [id number]  Referring provider: Michael Huitron*  Dx:   Encounter Diagnosis     ICD-10-CM    1. Chronic left shoulder pain  M25.512     G89.29                          Subjective: Pt reports he pulled a muscle in his hip/quad and he is unable toDaily Note     Today's date: 4/15/2025  Patient name: Markie Barrios  : 1955  MRN: [id number]  Referring provider: Michael Huitron*  Dx:   Encounter Diagnosis     ICD-10-CM    1. Chronic left shoulder pain  M25.512     G89.29                        Subjective: Pt reports his shoulder has been bothering him more than usual yesterday and today.    Objective: See treatment diary below    Assessment: Pt with good tolerance to treatment. Performed exercises in seated in standing as pt notes that because of his leg he is unable to lift it to lie on the table. Good tolerance to inclined table for flex/abd slides. Moderate cues required for scap retract + B ER with TB to ensure proper muscle activation. Added TB rows for additional scap strengthening today without adverse response, pt noting fatigue upon completion. Will resume full POC and progress as able in upcoming visits. Pt would benefit from continued skilled PT to improve current deficits and maximize function.    Plan: Continue per plan of care.  Progress treatment as tolerated.       Precautions:   Patient Active Problem List   Diagnosis    Abnormal electromyogram    Benign essential hypertension    BPH (benign prostatic hyperplasia)    DDD (degenerative disc disease), lumbosacral    Diastasis recti    Impaired fasting glucose    Other male erectile dysfunction    Low HDL (under 40)    Snoring    Spinal stenosis of lumbar region    Bilateral primary osteoarthritis of knee    Hypovolemia    Hyponatremia    Obesity, morbid (HCC)     Daily Treatment Diary      Visit # 11 12 3 4 5 6 7 8 9 10   Assessment  "4/8  4/10 4/15  3/13  3/18  3/20  3/25  3/27  4/1  4/3   Eval/Reval                  **   FOTO        **  performed       **NV   Manuals    L GH Mobs: Dist, Inf, Post  SE  SE np SE  SE  SE  SE  SE  MDD   SE    L Shoulder PROM    SE SE np                    Prescribed POC    Pt Education                       HEP Issued/Updated SE                     Pec/Biceps Stretch with MHP To Begin 5' 5'  5' 5' 5' 5' 5' 5' 5'  5'    Pulleys with L Shoulder MHP  10\"x5 min 10\"x5 min  20\"x5 min  5\"x5 min  5\"x5 min  5\"x5 min  10\"x5 min  10\"x5 min  10\"x5 min  10\"x5 min    Table Slides: Flexion & Scaption  5\"  2x10 ea Table elevated to 25*  5\"x10 ea  5\"x10 each  5\"x10 ea  5\"x15 ea  5\"  2x10 ea  5\"  2x10 ea  5\"  2x10 ea  5\"  2x10 ea  5\"  2x10 ea    Table ER Stretch 20\"x4 20\"x4 20\"x4 10\"x5 10\"x6 10\"x5 20\"x3 20\"x4 20\"x4 20\"x4    H/L Wand Press-Ups  2#  5\"  2x10 2#  5\"  2x10 np  5\"  1x15  5\"  2x10  5\"  2x10  5\"  2x10  5\"  2x10  5\"  2x10  5\"   3x10    H/L Wand Flexion  5\"  1x10 5\"  1x15 np  5\"  1x10  5\"  1x8  hold        5\"  1x10    H/L Pec Stretch          15\"x4  20\"x3  HEP        Scap Retractions   5\"x30 5\"x30  5\"x10  5\"x15  5\"x20  5\"x20  5\"x30    @ home today    Low Trap Retractions with B Shoulder ER  OTB  5\"  1x10  OTB  5\"  1x10 OTB  5\"  1x10    5\"x10  5\"x10  5\"x10            TB rows  OTB  5\"x15                        Pec/Biceps Stretch with CP To Conclude  5'  5'   10'  10'  10'  10'    5' post-tx       prone scap retractions  5\"x10  prone scap retractions  5\"x10  np              prone scap retraction  5\"x5        S/L ER AROM  5\"x10  np                 Modalities   MHP + Pec/Biceps Stretch  5'  5' pre-tx  5' pre-tx  5' pre-tx  5' pre-tx  5' pre-tx  5' pre-tx  5' pre-tx  5' pre-tx  5' pre-tx   CP +Pec/Biceps Stretch 5' 10' post tx np 10' post-tx 10' post-tx 10' post-tx 10' post-tx 5' post-tx 5' post-tx 5' post-tx   QR Code:      Access Code: [email or web address]  URL: [email or web address]  Date: 04/08/2025  Prepared by: Sherry" Caridad    Exercises  - Supine Single Arm Pectoralis Stretch  - 1-2 x daily - 31 sets - 3 reps - 15 hold  - Seated Shoulder Flexion Towel Slide at Table Top Full Range of Motion  - 1-2 x daily - 1 sets - 10 reps - 5 hold  - Seated Shoulder Scaption Slide at Table Top with Forearm in Neutral  - 1-2 x daily - 1 sets - 10 reps - 5 hold  - Seated Shoulder External Rotation PROM on Table  - 1-2 x daily - 1 sets - 3 reps - 10 hold  - Seated Scapular Retraction  - 1-2 x daily - 1 sets - 10 reps - 5 hold  - Shoulder External Rotation and Scapular Retraction  - 1 x daily - 1 sets - 10 reps - 5 hold  - Supine Shoulder Flexion with Dowel  - 1 x daily - 1 sets - 10 reps - 5 hold  - Supine Shoulder Press AAROM in Abduction with Dowel  - 1 x daily - 2 sets - 10 reps - 5 hold    Patient Education  - Ice  Objective: See treatment diary below    Assessment: Pt with good tolerance to treatment. Added S/L ER in pain free range with good tolerance and no adverse response. PT continues to demonstrate gradually improving ROM with completion of exercises and manual techniques. Will continue to progress as able. Pt would benefit from continued skilled PT to improve current deficits and maximize function.    Plan: Continue per plan of care.  Progress treatment as tolerated.       Precautions:   Patient Active Problem List   Diagnosis    Abnormal electromyogram    Benign essential hypertension    BPH (benign prostatic hyperplasia)    DDD (degenerative disc disease), lumbosacral    Diastasis recti    Impaired fasting glucose    Other male erectile dysfunction    Low HDL (under 40)    Snoring    Spinal stenosis of lumbar region    Bilateral primary osteoarthritis of knee    Hypovolemia    Hyponatremia    Obesity, morbid (HCC)     Daily Treatment Diary      Visit # 11 12 3 4 5 6 7 8 9 10   Assessment 4/8  4/10  3/11  3/13  3/18  3/20  3/25  3/27  4/1  4/3   Eval/Reval                  **   FOTO        **  performed       **NV   Manuals    L GH  "Mobs: Dist, Inf, Post  SE  SE  SE SE  SE  SE  SE  SE  MDD   SE    L Shoulder PROM   SE SE SE np                    Prescribed POC    Pt Education                       HEP Issued/Updated SE                     Pec/Biceps Stretch with MHP To Begin 5' 5'  5' w/pulleys 5' 5' 5' 5' 5' 5'  5'    Pulleys with L Shoulder MHP  10\"x5 min 10\"x5 min 20\"x5 min  5\"x5 min  5\"x5 min  5\"x5 min  10\"x5 min  10\"x5 min  10\"x5 min  10\"x5 min    Table Slides: Flexion & Scaption  5\"  2x10 ea Table elevated to 25*  5\"x10 ea Table elevated to 25*  5\"x10 ea  5\"x10 ea  5\"x15 ea  5\"  2x10 ea  5\"  2x10 ea  5\"  2x10 ea  5\"  2x10 ea  5\"  2x10 ea    Table ER Stretch 20\"x4 20\"x4  10\"x5 10\"x6 10\"x5 20\"x3 20\"x4 20\"x4 20\"x4    H/L Wand Press-Ups  2#  5\"  2x10 2#  5\"  2x10   5\"  1x15  5\"  2x10  5\"  2x10  5\"  2x10  5\"  2x10  5\"  2x10  5\"   3x10    H/L Wand Flexion  5\"  1x10 5\"  1x15   5\"  1x10  5\"  1x8  hold        5\"  1x10    H/L Pec Stretch          15\"x4  20\"x3  HEP        Scap Retractions   5\"x30   5\"x10  5\"x15  5\"x20  5\"x20  5\"x30    @ home today    Low Trap Retractions with B Shoulder ER  OTB  5\"  1x10  OTB  5\"  1x10     5\"x10  5\"x10  5\"x10          Pec/Biceps Stretch with CP To Conclude  5'  5'   10'  10'  10'  10'    5' post-tx       prone scap retractions  5\"x10  prone scap retractions  5\"x10                prone scap retraction  5\"x5        S/L ER AROM  5\"x10                   Modalities   MHP + Pec/Biceps Stretch  5'  5' pre-tx  5' pre-tx  5' pre-tx  5' pre-tx  5' pre-tx  5' pre-tx  5' pre-tx  5' pre-tx  5' pre-tx   CP +Pec/Biceps Stretch 5' 10' post-tx 10' post-tx 10' post-tx 10' post-tx 10' post-tx 10' post-tx 5' post-tx 5' post-tx 5' post-tx   QR Code:      Access Code: [email or web address]  URL: [email or web address]  Date: 04/08/2025  Prepared by: Sherry Mclean    Exercises  - Supine Single Arm Pectoralis Stretch  - 1-2 x daily - 31 sets - 3 reps - 15 hold  - Seated Shoulder Flexion Towel Slide at Table Top Full Range of Motion  - 1-2 " x daily - 1 sets - 10 reps - 5 hold  - Seated Shoulder Scaption Slide at Table Top with Forearm in Neutral  - 1-2 x daily - 1 sets - 10 reps - 5 hold  - Seated Shoulder External Rotation PROM on Table  - 1-2 x daily - 1 sets - 3 reps - 10 hold  - Seated Scapular Retraction  - 1-2 x daily - 1 sets - 10 reps - 5 hold  - Shoulder External Rotation and Scapular Retraction  - 1 x daily - 1 sets - 10 reps - 5 hold  - Supine Shoulder Flexion with Dowel  - 1 x daily - 1 sets - 10 reps - 5 hold  - Supine Shoulder Press AAROM in Abduction with Dowel  - 1 x daily - 2 sets - 10 reps - 5 hold    Patient Education  - Ice

## 2025-04-17 ENCOUNTER — OFFICE VISIT (OUTPATIENT)
Dept: PHYSICAL THERAPY | Facility: REHABILITATION | Age: 70
End: 2025-04-17
Payer: MEDICARE

## 2025-04-17 DIAGNOSIS — M25.512 CHRONIC LEFT SHOULDER PAIN: Primary | ICD-10-CM

## 2025-04-17 DIAGNOSIS — G89.29 CHRONIC LEFT SHOULDER PAIN: Primary | ICD-10-CM

## 2025-04-17 PROCEDURE — 97140 MANUAL THERAPY 1/> REGIONS: CPT | Performed by: PHYSICAL THERAPIST

## 2025-04-17 PROCEDURE — 97112 NEUROMUSCULAR REEDUCATION: CPT | Performed by: PHYSICAL THERAPIST

## 2025-04-17 PROCEDURE — 97110 THERAPEUTIC EXERCISES: CPT | Performed by: PHYSICAL THERAPIST

## 2025-04-17 NOTE — PROGRESS NOTES
Daily Note     Today's date: 2025  Patient name: Markie Barrios  : 1955  MRN: [id number]  Referring provider: Michael Huitron*  Dx:   Encounter Diagnosis     ICD-10-CM    1. Chronic left shoulder pain  M25.512     G89.29                      Subjective: Pt reports he feels his shoulder is getting better, still has certain motions that cause pain and grinding in his shoulder. He reports he had his consultation with Dr. Flood and although TSR was advised, he does not wish to proceed with that recommendation at this time.     Objective: See treatment diary below.     Assessment: Pt with fair response to manual techniques requiring moderate verbal and manual cues for relaxation. Pt with good tolerance to progression of program with addition of TB Ext, Add and Row. He required maximal verbal and manual cues required for correct form and performance. Pt demonstrates good understanding and carryover with current program. Will continue to progress program as able. Pt will benefit from continued skilled PT intervention in order to address remaining limitations and to restore maximal function.     Plan: Continue per plan of care.  Progress treatment as tolerated.       Precautions:   Patient Active Problem List   Diagnosis    Abnormal electromyogram    Benign essential hypertension    BPH (benign prostatic hyperplasia)    DDD (degenerative disc disease), lumbosacral    Diastasis recti    Impaired fasting glucose    Other male erectile dysfunction    Low HDL (under 40)    Snoring    Spinal stenosis of lumbar region    Bilateral primary osteoarthritis of knee    Hypovolemia    Hyponatremia    Obesity, morbid (HCC)     Daily Treatment Diary      Visit # 11 12 13 14 5 6 7 8 9 10   Assessment 4/8  4/10 4/15  4/17  3/18  3/20  3/25  3/27  4/1  4/3   Eval/Reval                  **   FOTO        **  performed       **NV   Manuals    L GH Mobs: Dist, Inf, Post  SE  SE np MD  SE  SE  SE  SE  MDD   SE    L  "Shoulder PROM    MD SE np                    Prescribed POC    Pt Education                       HEP Issued/Updated SE                     Pec/Biceps Stretch with MHP To Begin 5' 5'  5'  5' 5' 5' 5' 5'  5'    Pulleys with L Shoulder MHP  10\"x5 min 10\"x5 min  20\"x5 min  20\"x5 min  With MHP  5\"x5 min  5\"x5 min  10\"x5 min  10\"x5 min  10\"x5 min  10\"x5 min    Table Slides: Flexion & Scaption  5\"  2x10 ea Table elevated to 25*  5\"x10 ea  5\"x10 each  HEP  5\"x15 ea  5\"  2x10 ea  5\"  2x10 ea  5\"  2x10 ea  5\"  2x10 ea  5\"  2x10 ea    Table ER Stretch 20\"x4 20\"x4 20\"x4  HEP 10\"x6 10\"x5 20\"x3 20\"x4 20\"x4 20\"x4    H/L Wand Press-Ups  2#  5\"  2x10 2#  5\"  2x10 np  2#  5\"  2x10  5\"  2x10  5\"  2x10  5\"  2x10  5\"  2x10  5\"  2x10  5\"   3x10    H/L Wand Flexion  5\"  1x10 5\"  1x15 np  2#  5\"  2x10  5\"  1x8  hold        5\"  1x10    H/L Pec Stretch          15\"x4  20\"x3  HEP        TB Rows     Pink   5\"   1x10          TB Ext     Pink   5\"   1x10          TB Add     Pink   5\"   1x10          Scap Retractions   5\"x30 5\"x30    5\"x15  5\"x20  5\"x20  5\"x30    @ home today    Low Trap Retractions with B Shoulder ER  OTB  5\"  1x10  OTB  5\"  1x10 OTB  5\"  1x10    5\"x10  5\"x10  5\"x10            TB rows  OTB  5\"x15                        Pec/Biceps Stretch with CP To Conclude  5'  5'     10'  10'  10'    5' post-tx       prone scap retractions  5\"x10  prone scap retractions  5\"x10  np              prone scap retraction  5\"x5        S/L ER AROM  5\"x10  np                 Modalities   MHP + Pec/Biceps Stretch  5'  5' pre-tx  5' pre-tx  5' w/Pulleys  5' pre-tx  5' pre-tx  5' pre-tx  5' pre-tx  5' pre-tx  5' pre-tx   CP +Pec/Biceps Stretch 5' 10' post tx np 10' Seated 10' post-tx 10' post-tx 10' post-tx 5' post-tx 5' post-tx 5' post-tx   QR Code:      Access Code: [email or web address]  URL: [email or web address]  Date: 04/08/2025  Prepared by: Sherry Mclean    Exercises  - Supine Single Arm Pectoralis Stretch  - 1-2 x daily - 31 sets - 3 reps - 15 " hold  - Seated Shoulder Flexion Towel Slide at Table Top Full Range of Motion  - 1-2 x daily - 1 sets - 10 reps - 5 hold  - Seated Shoulder Scaption Slide at Table Top with Forearm in Neutral  - 1-2 x daily - 1 sets - 10 reps - 5 hold  - Seated Shoulder External Rotation PROM on Table  - 1-2 x daily - 1 sets - 3 reps - 10 hold  - Seated Scapular Retraction  - 1-2 x daily - 1 sets - 10 reps - 5 hold  - Shoulder External Rotation and Scapular Retraction  - 1 x daily - 1 sets - 10 reps - 5 hold  - Supine Shoulder Flexion with Dowel  - 1 x daily - 1 sets - 10 reps - 5 hold  - Supine Shoulder Press AAROM in Abduction with Dowel  - 1 x daily - 2 sets - 10 reps - 5 hold    Patient Education  - Ice  Objective: See treatment diary below    Assessment: Pt with good tolerance to treatment. Added S/L ER in pain free range with good tolerance and no adverse response. PT continues to demonstrate gradually improving ROM with completion of exercises and manual techniques. Will continue to progress as able. Pt would benefit from continued skilled PT to improve current deficits and maximize function.    Plan: Continue per plan of care.  Progress treatment as tolerated.

## 2025-04-22 ENCOUNTER — OFFICE VISIT (OUTPATIENT)
Dept: PHYSICAL THERAPY | Facility: REHABILITATION | Age: 70
End: 2025-04-22
Payer: MEDICARE

## 2025-04-22 DIAGNOSIS — M25.512 CHRONIC LEFT SHOULDER PAIN: Primary | ICD-10-CM

## 2025-04-22 DIAGNOSIS — G89.29 CHRONIC LEFT SHOULDER PAIN: Primary | ICD-10-CM

## 2025-04-22 PROCEDURE — 97112 NEUROMUSCULAR REEDUCATION: CPT | Performed by: PHYSICAL THERAPIST

## 2025-04-22 PROCEDURE — 97140 MANUAL THERAPY 1/> REGIONS: CPT | Performed by: PHYSICAL THERAPIST

## 2025-04-22 PROCEDURE — 97110 THERAPEUTIC EXERCISES: CPT | Performed by: PHYSICAL THERAPIST

## 2025-04-22 NOTE — PROGRESS NOTES
"Daily Note     Today's date: 2025  Patient name: Markie Barrios  : 1955  MRN: [id number]  Referring provider: Michael Huitron*  Dx:   Encounter Diagnosis     ICD-10-CM    1. Chronic left shoulder pain  M25.512     G89.29                      Subjective: Pt reports he feels he \"went a little backward\" over the weekend, unsure of exactly what caused this. He reports he hasn't been as aware of or correction his posture as often while sitting at home.     Objective: See treatment diary below.     Assessment: Pt with fair response to manual techniques requiring moderate verbal and manual cues for relaxation. Pt with good tolerance to current program with transition to standing TB strengthening exercises. He required maximal verbal and manual cues required for correct form and performance. Pt demonstrates good understanding and carryover with current program. Will continue to progress program as able. Pt will benefit from continued skilled PT intervention in order to address remaining limitations and to restore maximal function.     Plan: Continue per plan of care.  Progress treatment as tolerated.       Precautions:   Patient Active Problem List   Diagnosis    Abnormal electromyogram    Benign essential hypertension    BPH (benign prostatic hyperplasia)    DDD (degenerative disc disease), lumbosacral    Diastasis recti    Impaired fasting glucose    Other male erectile dysfunction    Low HDL (under 40)    Snoring    Spinal stenosis of lumbar region    Bilateral primary osteoarthritis of knee    Hypovolemia    Hyponatremia    Obesity, morbid (HCC)     Daily Treatment Diary      Visit # 11 12 13 14 5 6 7 8 9 10   Assessment 4/8  4/10 4/15  4/17  4/22        Eval/Reval                 FOTO        NV **       Manuals    L GH Mobs: Dist, Inf, Post  SE  SE np MD LARIOS         L Shoulder PROM    MD LARIOS                     Prescribed POC    Pt Education                       HEP Issued/Updated SE            " "         Pec/Biceps Stretch with MHP To Begin 5' 5'  5'  With CP to conlude   5'         Pulleys with L Shoulder MHP  10\"x5 min 10\"x5 min  20\"x5 min  20\"x5 min  With MHP  20\"x5 min  With MHP         Table Slides: Flexion & Scaption  5\"  2x10 ea Table elevated to 25*  5\"x10 ea  5\"x10 each  HEP  HEP         Table ER Stretch 20\"x4 20\"x4 20\"x4  HEP HEP         H/L Wand Press-Ups  2#  5\"  2x10 2#  5\"  2x10 np  2#  5\"  2x10          H/L Wand Flexion  5\"  1x10 5\"  1x15 np  2#  5\"  2x10 Standing NV         Flexion Wall Slides        NV         TB Rows     Pink   5\"   1x10  Pink   5\"   1x10         TB Ext     Pink   5\"   1x10  Pink   5\"   1x10         TB Add     Pink   5\"   1x10  Pink   5\"   1x10         Scap Retractions   5\"x30 5\"x30  HEP  HEP         Low Trap Retractions with B Shoulder ER  OTB  5\"  1x10  OTB  5\"  1x10 OTB  5\"  1x10               TB rows  OTB  5\"x15                        Pec/Biceps Stretch with CP To Conclude  5'  5'     5'          prone scap retractions  5\"x10  prone scap retractions  5\"x10  np                 S/L ER AROM  5\"x10  np                 Modalities   MHP + Pec/Biceps Stretch  5'  5' pre-tx  5' pre-tx  5' w/Pulleys  5' w/pulleys        CP +Pec/Biceps Stretch 5' 10' post tx np 10' Seated 5' w/pec stretch        QR Code:      Access Code: [email or web address]  URL: [email or web address]  Date: 04/08/2025  Prepared by: Sherry Mclean    Exercises  - Supine Single Arm Pectoralis Stretch  - 1-2 x daily - 31 sets - 3 reps - 15 hold  - Seated Shoulder Flexion Towel Slide at Table Top Full Range of Motion  - 1-2 x daily - 1 sets - 10 reps - 5 hold  - Seated Shoulder Scaption Slide at Table Top with Forearm in Neutral  - 1-2 x daily - 1 sets - 10 reps - 5 hold  - Seated Shoulder External Rotation PROM on Table  - 1-2 x daily - 1 sets - 3 reps - 10 hold  - Seated Scapular Retraction  - 1-2 x daily - 1 sets - 10 reps - 5 hold  - Shoulder External Rotation and Scapular Retraction  - 1 x daily - 1 sets - " 10 reps - 5 hold  - Supine Shoulder Flexion with Dowel  - 1 x daily - 1 sets - 10 reps - 5 hold  - Supine Shoulder Press AAROM in Abduction with Dowel  - 1 x daily - 2 sets - 10 reps - 5 hold    Patient Education  - Ice  Objective: See treatment diary below    Assessment: Pt with good tolerance to treatment. Added S/L ER in pain free range with good tolerance and no adverse response. PT continues to demonstrate gradually improving ROM with completion of exercises and manual techniques. Will continue to progress as able. Pt would benefit from continued skilled PT to improve current deficits and maximize function.    Plan: Continue per plan of care.  Progress treatment as tolerated.

## 2025-04-24 ENCOUNTER — OFFICE VISIT (OUTPATIENT)
Dept: PHYSICAL THERAPY | Facility: REHABILITATION | Age: 70
End: 2025-04-24
Payer: MEDICARE

## 2025-04-24 DIAGNOSIS — G89.29 CHRONIC LEFT SHOULDER PAIN: Primary | ICD-10-CM

## 2025-04-24 DIAGNOSIS — M25.512 CHRONIC LEFT SHOULDER PAIN: Primary | ICD-10-CM

## 2025-04-24 PROCEDURE — 97110 THERAPEUTIC EXERCISES: CPT

## 2025-04-24 PROCEDURE — 97112 NEUROMUSCULAR REEDUCATION: CPT

## 2025-04-24 PROCEDURE — 97140 MANUAL THERAPY 1/> REGIONS: CPT

## 2025-04-24 NOTE — PROGRESS NOTES
Daily Note     Today's date: 2025  Patient name: Markie Barrios  : 1955  MRN: [id number]  Referring provider: Michael Huitron*  Dx:   Encounter Diagnosis     ICD-10-CM    1. Chronic left shoulder pain  M25.512     G89.29                      Subjective: Pt reports his shoulder felt good yesterday, but he woke up this morning and it has been bothering him again. He reports today is the first day in a while that he has had to take Advil. He has an appointment with Dr. Flood next Wednesday for injections.    Objective: See treatment diary below.     Assessment: Pt with good tolerance to treatment and progression of TB strengthening on this date. AAROM performed in standing on this date with good tolerance. Pt notes some discomfort and grinding overall t/o treatment, however denies increase in pain. Fair tolerance to manual techniques performed, continues to require monderate cues for relaxation throughout. Will continue to progress as able. Pt would benefit from continued skilled PT to improve current deficits and maximize function.    Plan: Continue per plan of care.  Progress treatment as tolerated.       Precautions:   Patient Active Problem List   Diagnosis    Abnormal electromyogram    Benign essential hypertension    BPH (benign prostatic hyperplasia)    DDD (degenerative disc disease), lumbosacral    Diastasis recti    Impaired fasting glucose    Other male erectile dysfunction    Low HDL (under 40)    Snoring    Spinal stenosis of lumbar region    Bilateral primary osteoarthritis of knee    Hypovolemia    Hyponatremia    Obesity, morbid (HCC)     Daily Treatment Diary      Visit # 11 12 13 14 5 6 7 8 9 10   Assessment 4/8  4/10 4/15  4/17  4/22 4/24       Eval/Reval                 FOTO        NV 42/58       Manuals    L GH Mobs: Dist, Inf, Post  SE  SE np MD LARIOS SE        L Shoulder PROM    MD LARIOS SE                    Prescribed POC    Pt Education                       HEP  "Issued/Updated SE                     Pec/Biceps Stretch with MHP To Begin 5' 5'  5'  With CP to conlude   5' 5'        Pulleys with L Shoulder MHP  10\"x5 min 10\"x5 min  20\"x5 min  20\"x5 min  With MHP  20\"x5 min  With MHP 20\"x5 min w/MHP        Table Slides: Flexion & Scaption  5\"  2x10 ea Table elevated to 25*  5\"x10 ea  5\"x10 each  HEP  HEP         Table ER Stretch 20\"x4 20\"x4 20\"x4  HEP HEP         H/L Wand Press-Ups  2#  5\"  2x10 2#  5\"  2x10 np  2#  5\"  2x10          H/L Wand Flexion  5\"  1x10 5\"  1x15 np  2#  5\"  2x10 Standing NV Standing  2#  5\"  1x10        Flexion Wall Slides        NV 5\"x10        TB Rows     Pink   5\"   1x10  Pink   5\"   1x10 Pink  5\"  1x10  1x5          TB Ext     Pink   5\"   1x10  Pink   5\"   1x10 Pink  5\"  1x10  1x5        TB Add     Pink   5\"   1x10  Pink   5\"   1x10 Pink  5\"  1x10        Scap Retractions   5\"x30 5\"x30  HEP  HEP         Low Trap Retractions with B Shoulder ER  OTB  5\"  1x10  OTB  5\"  1x10 OTB  5\"  1x10               TB rows  OTB  5\"x15                        Pec/Biceps Stretch with CP To Conclude  5'  5'     5' 5'         prone scap retractions  5\"x10  prone scap retractions  5\"x10  np                 S/L ER AROM  5\"x10  np                 Modalities   MHP + Pec/Biceps Stretch  5'  5' pre-tx  5' pre-tx  5' w/Pulleys  5' w/pulleys 5' w/pulleys       CP +Pec/Biceps Stretch 5' 10' post tx np 10' Seated 5' w/pec stretch 5' w/pec stretch       QR Code:      Access Code: [email or web address]  URL: [email or web address]  Date: 04/08/2025  Prepared by: Sherry Mclean    Exercises  - Supine Single Arm Pectoralis Stretch  - 1-2 x daily - 31 sets - 3 reps - 15 hold  - Seated Shoulder Flexion Towel Slide at Table Top Full Range of Motion  - 1-2 x daily - 1 sets - 10 reps - 5 hold  - Seated Shoulder Scaption Slide at Table Top with Forearm in Neutral  - 1-2 x daily - 1 sets - 10 reps - 5 hold  - Seated Shoulder External Rotation PROM on Table  - 1-2 x daily - 1 sets - 3 reps - 10 " hold  - Seated Scapular Retraction  - 1-2 x daily - 1 sets - 10 reps - 5 hold  - Shoulder External Rotation and Scapular Retraction  - 1 x daily - 1 sets - 10 reps - 5 hold  - Supine Shoulder Flexion with Dowel  - 1 x daily - 1 sets - 10 reps - 5 hold  - Supine Shoulder Press AAROM in Abduction with Dowel  - 1 x daily - 2 sets - 10 reps - 5 hold    Patient Education  - Ice

## 2025-04-29 ENCOUNTER — OFFICE VISIT (OUTPATIENT)
Dept: PHYSICAL THERAPY | Facility: REHABILITATION | Age: 70
End: 2025-04-29
Payer: MEDICARE

## 2025-04-29 DIAGNOSIS — M25.512 CHRONIC LEFT SHOULDER PAIN: Primary | ICD-10-CM

## 2025-04-29 DIAGNOSIS — G89.29 CHRONIC LEFT SHOULDER PAIN: Primary | ICD-10-CM

## 2025-04-29 PROCEDURE — 97110 THERAPEUTIC EXERCISES: CPT

## 2025-04-29 PROCEDURE — 97112 NEUROMUSCULAR REEDUCATION: CPT

## 2025-04-29 NOTE — PROGRESS NOTES
"Daily Note     Today's date: 2025  Patient name: Markie Barrios  : 1955  MRN: [id number]  Referring provider: Michael Huitron*  Dx:   Encounter Diagnosis     ICD-10-CM    1. Chronic left shoulder pain  M25.512     G89.29                      Subjective: Pt reports his shoulder feels about the same. He has an appointment with Dr. Flood tomorrow.    Objective: See treatment diary below.     Assessment: Pt with fair tolerance to manual techniques performed with moderate cues for relaxation t/o. Pt continues to note some discomfort and grinding throughout treatment today. Appropriate challenge and fatigue with TE as prescribed below. Will continue to progress as able. Pt would benefit from continued skilled PT to improve current deficits and maximize function.    Plan: Continue per plan of care.  Progress treatment as tolerated.       Precautions:   Patient Active Problem List   Diagnosis    Abnormal electromyogram    Benign essential hypertension    BPH (benign prostatic hyperplasia)    DDD (degenerative disc disease), lumbosacral    Diastasis recti    Impaired fasting glucose    Other male erectile dysfunction    Low HDL (under 40)    Snoring    Spinal stenosis of lumbar region    Bilateral primary osteoarthritis of knee    Hypovolemia    Hyponatremia    Obesity, morbid (HCC)     Daily Treatment Diary      Visit # 11 12 13 14 5 6 7 8 9 10   Assessment 4/8  4/10 4/15  4/17  4/22 4/24 4/29      Eval/Reval                 FOTO        NV 42/58       Manuals    L GH Mobs: Dist, Inf, Post  SE  SE np MD LARIOS SE SE       L Shoulder PROM    MD LARIOS SE SE                   Prescribed POC    Pt Education                       HEP Issued/Updated SE                     Pec/Biceps Stretch with MHP To Begin 5' 5'  5'  With CP to conlude   5' 5'        Pulleys with L Shoulder MHP  10\"x5 min 10\"x5 min  20\"x5 min  20\"x5 min  With MHP  20\"x5 min  With MHP 20\"x5 min w/MHP 20\"x5 min w/MHP       Table Slides: " "Flexion & Scaption  5\"  2x10 ea Table elevated to 25*  5\"x10 ea  5\"x10 each  HEP  HEP         Table ER Stretch 20\"x4 20\"x4 20\"x4  HEP HEP         H/L Wand Press-Ups  2#  5\"  2x10 2#  5\"  2x10 np  2#  5\"  2x10          H/L Wand Flexion  5\"  1x10 5\"  1x15 np  2#  5\"  2x10 Standing NV Standing  2#  5\"  1x10 Standing  2#  5\"  1x10       Flexion Wall Slides        NV 5\"x10 5\"x10       TB Rows     Pink   5\"   1x10  Pink   5\"   1x10 Pink  5\"  1x10  1x5   Pink  5\"  1x10  1x5       TB Ext     Pink   5\"   1x10  Pink   5\"   1x10 Pink  5\"  1x10  1x5 Pink  1x10  1x5       TB Add     Pink   5\"   1x10  Pink   5\"   1x10 Pink  5\"  1x10 Pink  5\"  1x10       Scap Retractions   5\"x30 5\"x30  HEP  HEP         Low Trap Retractions with B Shoulder ER  OTB  5\"  1x10  OTB  5\"  1x10 OTB  5\"  1x10               TB rows  OTB  5\"x15                        Pec/Biceps Stretch with CP To Conclude  5'  5'     5' 5' 5'        prone scap retractions  5\"x10  prone scap retractions  5\"x10  np                 S/L ER AROM  5\"x10  np                 Modalities   MHP + Pec/Biceps Stretch  5'  5' pre-tx  5' pre-tx  5' w/Pulleys  5' w/pulleys 5' w/pulleys 5' w/pulleys      CP +Pec/Biceps Stretch 5' 10' post tx np 10' Seated 5' w/pec stretch 5' w/pec stretch 5' w/pec stretch      QR Code:      Access Code: [email or web address]  URL: [email or web address]  Date: 04/08/2025  Prepared by: Sherry Mclean    Exercises  - Supine Single Arm Pectoralis Stretch  - 1-2 x daily - 31 sets - 3 reps - 15 hold  - Seated Shoulder Flexion Towel Slide at Table Top Full Range of Motion  - 1-2 x daily - 1 sets - 10 reps - 5 hold  - Seated Shoulder Scaption Slide at Table Top with Forearm in Neutral  - 1-2 x daily - 1 sets - 10 reps - 5 hold  - Seated Shoulder External Rotation PROM on Table  - 1-2 x daily - 1 sets - 3 reps - 10 hold  - Seated Scapular Retraction  - 1-2 x daily - 1 sets - 10 reps - 5 hold  - Shoulder External Rotation and Scapular Retraction  - 1 x daily - 1 sets " - 10 reps - 5 hold  - Supine Shoulder Flexion with Dowel  - 1 x daily - 1 sets - 10 reps - 5 hold  - Supine Shoulder Press AAROM in Abduction with Dowel  - 1 x daily - 2 sets - 10 reps - 5 hold    Patient Education  - Ice

## 2025-04-30 ENCOUNTER — OFFICE VISIT (OUTPATIENT)
Dept: OBGYN CLINIC | Facility: OTHER | Age: 70
End: 2025-04-30
Payer: MEDICARE

## 2025-04-30 ENCOUNTER — APPOINTMENT (OUTPATIENT)
Dept: RADIOLOGY | Facility: OTHER | Age: 70
End: 2025-04-30
Attending: ORTHOPAEDIC SURGERY
Payer: MEDICARE

## 2025-04-30 DIAGNOSIS — M25.511 RIGHT SHOULDER PAIN, UNSPECIFIED CHRONICITY: ICD-10-CM

## 2025-04-30 DIAGNOSIS — M25.511 RIGHT SHOULDER PAIN, UNSPECIFIED CHRONICITY: Primary | ICD-10-CM

## 2025-04-30 PROCEDURE — 99213 OFFICE O/P EST LOW 20 MIN: CPT | Performed by: ORTHOPAEDIC SURGERY

## 2025-04-30 PROCEDURE — 73030 X-RAY EXAM OF SHOULDER: CPT

## 2025-04-30 PROCEDURE — 20610 DRAIN/INJ JOINT/BURSA W/O US: CPT | Performed by: ORTHOPAEDIC SURGERY

## 2025-04-30 RX ORDER — METHYLPREDNISOLONE ACETATE 40 MG/ML
1 INJECTION, SUSPENSION INTRA-ARTICULAR; INTRALESIONAL; INTRAMUSCULAR; SOFT TISSUE
Status: COMPLETED | OUTPATIENT
Start: 2025-04-30 | End: 2025-04-30

## 2025-04-30 RX ORDER — ROPIVACAINE HYDROCHLORIDE 5 MG/ML
4 INJECTION, SOLUTION EPIDURAL; INFILTRATION; PERINEURAL
Status: COMPLETED | OUTPATIENT
Start: 2025-04-30 | End: 2025-04-30

## 2025-04-30 RX ADMIN — METHYLPREDNISOLONE ACETATE 1 ML: 40 INJECTION, SUSPENSION INTRA-ARTICULAR; INTRALESIONAL; INTRAMUSCULAR; SOFT TISSUE at 09:30

## 2025-04-30 RX ADMIN — ROPIVACAINE HYDROCHLORIDE 4 ML: 5 INJECTION, SOLUTION EPIDURAL; INFILTRATION; PERINEURAL at 09:30

## 2025-04-30 NOTE — PROGRESS NOTES
Orthopaedic Surgery - Office Note  Markie Barrios (69 y.o. male)   : 1955   MRN: [id number]  Encounter Date: 2025    Assessment / Plan    Bilateral shoulder glenohumeral osteoarthritis  We did have a long discussion about treatment options for osteoarthritis of the shoulder.  This included conservative versus surgical treatment.  He does understand that surgical treatment would be a shoulder replacement.  He would like to continue with conservative treatment at this time as long as it is working.  After discussion of risk and benefits the patient agreed to proceed with bilateral shoulder steroid injections in the glenohumeral joint.  These were prepared and injected sterilely and tolerated well.  Continue with modalities such as ice and heat and anti-inflammatories as needed.  Continue with physical therapy with progression to home program.  We did discuss the option for repeat injections going forward if needed.  Did review that if he does have a steroid injection it would be a 3-month wait between the injection and surgery.  Follow-up:  Return in about 3 months (around 2025) for follow up with Justin.       Chief Complaint / Date of Onset  Left shoulder pain worsening over the last 3 months.  Injury Mechanism / Date  None  Surgery / Date  None    History of Present Illness   Markie Barrios is a 69 y.o. male who presents for evaluation of both shoulders which have been bothering him for approximately 3-4 months.  Patient is R handed.  The left shoulder bothers him more than the right.  His pain at this time is mostly in the posterior aspect of his shoulder.  He did start physical therapy which he feels has improved some of his pain but he still very limited.  He does notice frequent crepitus/crunching in his shoulder with movement which is not always painful but uncomfortable.  He occasionally takes over-the-counter Tylenol or Advil if needed.  He feels that his pain is worse at  nighttime.  He denies any history of injury to his shoulder.  He is coming today for bilateral shoulder steroid injections.    Treatment Summary  Medications / Modalities  Over-the-counter Advil as needed  Bracing / Immobilization  None  Physical Therapy  Regularly over the last month  Injections  None  Prior Surgeries  R TKR 1 year ago with [facility]  Other Treatments  None    Employment / Current Status  Retired        Review of Systems  Pertinent items are noted in HPI.  All other systems were reviewed and are negative.      Physical Exam  There were no vitals taken for this visit.  Cons: Appears well.  No apparent distress.  Psych: Alert. Oriented x3.  Mood and affect normal.  Eyes: PERRLA, EOMI  Resp: Normal effort.  No audible wheezing or stridor.  CV: Palpable pulse.  No discernable arrhythmia.  No LE edema.  Lymph:  No palpable cervical, axillary, or inguinal lymphadenopathy.  Skin: Warm.  No palpable masses.  No visible lesions.  Neuro: Normal muscle tone.  Normal and symmetric DTR's.     Bilateral shoulder Exam  Alignment / Posture:  Normal cervical alignment. Normal shoulder posture. Normal scapular position.  Inspection:  No swelling. No erythema. No ecchymosis. No deformity.  Palpation:   Mild tenderness at lateral aspect of the shoulder over the subacromial space.  Palpable crepitus with range of motion..  ROM:  Shoulder . Shoulder ER 45. Shoulder IR L2.  Strength:  5/5 supraspinatus, infraspinatus, and subscapularis.  Stability:  No objective shoulder instability.  Tests: (+) Speed. (+) Marsteller. (-) Valdez. (-) Neer. (-) Spurling.  Neurovascular:  Sensation intact in Ax/R/M/U nerve distributions. Sensation intact in all digital nerve distributions. Fingers warm and perfused.       Studies Reviewed  I have personally reviewed pertinent films in PACS.  XR of bilateral shoulder - from the left from 3/4/2025 and the right from 4/30/2025 show severe osteoarthritic degenerative change of the left  glenohumeral joint with mild AC joint arthritic changes.  Subchondral sclerosis and spurring seen in the glenohumeral joint.      Large joint arthrocentesis: bilateral glenohumeral  Universal Protocol:  Consent: Verbal consent obtained.  Consent given by: patient  Patient identity confirmed: verbally with patient  Supporting Documentation  Indications: pain     Is this a Visco injection? NoProcedure Details  Location: shoulder - bilateral glenohumeral  Needle size: 22 G  Ultrasound guidance: no  Approach: anterior    Medications (Right): 1 mL methylPREDNISolone acetate 40 mg/mL; 4 mL ropivacaine 0.5 %Medications (Left): 1 mL methylPREDNISolone acetate 40 mg/mL; 4 mL ropivacaine 0.5 %   Patient tolerance: patient tolerated the procedure well with no immediate complications  Dressing:  Sterile dressing applied             Medical, Surgical, Family, and Social History  The patient's medical history, family history, and social history, were reviewed and updated as appropriate.    Past Medical History:   Diagnosis Date    BPH (benign prostatic hyperplasia)     Dyslipidemia     Hypertension     Impaired fasting glucose     Osteoarthritis of both knees        Past Surgical History:   Procedure Laterality Date    COLONOSCOPY  11/23/2020    tubular adenoma; Dr Ross    REPLACEMENT TOTAL KNEE Right        Family History   Problem Relation Age of Onset    Hypertension Mother     Kidney disease Mother     No Known Problems Sister     No Known Problems Brother     No Known Problems Brother     No Known Problems Son     No Known Problems Son     No Known Problems Daughter        Social History     Occupational History    Occupation: retired   Tobacco Use    Smoking status: Never     Passive exposure: Never    Smokeless tobacco: Never   Vaping Use    Vaping status: Never Used   Substance and Sexual Activity    Alcohol use: Not Currently     Comment: occasional    Drug use: Never    Sexual activity: Not Currently       No Known  Allergies      Current Outpatient Medications:     enalapril (VASOTEC) 20 mg tablet, TAKE 1 TABLET TWICE A DAY, Disp: 180 tablet, Rfl: 1    finasteride (PROSCAR) 5 mg tablet, take 1 tablet by mouth once daily, Disp: 30 tablet, Rfl: 5    hydroCHLOROthiazide 25 mg tablet, TAKE 1 TABLET DAILY, Disp: 90 tablet, Rfl: 1    tamsulosin (FLOMAX) 0.4 mg, take 3 capsules by mouth once daily with DINNER, Disp: 270 capsule, Rfl: 1    sildenafil (VIAGRA) 50 MG tablet, Take 1 tablet (50 mg total) by mouth daily as needed for erectile dysfunction (Patient not taking: Reported on 10/8/2024), Disp: 10 tablet, Rfl: 3      Justin Chong PA-C    Scribe Attestation      I,:   am acting as a scribe while in the presence of the attending physician.:       I,:   personally performed the services described in this documentation    as scribed in my presence.:

## 2025-05-01 ENCOUNTER — TELEPHONE (OUTPATIENT)
Age: 70
End: 2025-05-01

## 2025-05-01 ENCOUNTER — OFFICE VISIT (OUTPATIENT)
Dept: PHYSICAL THERAPY | Facility: REHABILITATION | Age: 70
End: 2025-05-01
Payer: MEDICARE

## 2025-05-01 DIAGNOSIS — G89.29 CHRONIC LEFT SHOULDER PAIN: Primary | ICD-10-CM

## 2025-05-01 DIAGNOSIS — M25.512 CHRONIC LEFT SHOULDER PAIN: Primary | ICD-10-CM

## 2025-05-01 PROCEDURE — 97112 NEUROMUSCULAR REEDUCATION: CPT

## 2025-05-01 PROCEDURE — 97110 THERAPEUTIC EXERCISES: CPT

## 2025-05-01 NOTE — TELEPHONE ENCOUNTER
Caller: Patient     Doctor: Dr. Flood     Reason for call: Asked to discuss something about his appointment yesterday     Call back#: [phone]

## 2025-05-01 NOTE — PROGRESS NOTES
"Daily Note     Today's date: 2025  Patient name: Markie Barrios  : 1955  MRN: [id number]  Referring provider: Michael Huitron*  Dx:   Encounter Diagnosis     ICD-10-CM    1. Chronic left shoulder pain  M25.512     G89.29                      Subjective: Pt comes to therapy reporting he received B shoulder CSI yesterday and he is already feeling better with decreased pain.    Objective: See treatment diary below.   Issued updated HEP to which pt verbalizes and demonstrates understanding.    Assessment: Pt with improved tolerance to manual techniques and exercises on this date, able to progress as listed below. Continues with some pain/discomfort with TB ADD. Will continue to progress as able. Pt would benefit from continued skilled PT to improve current deficits and maximize function.    Plan: Continue per plan of care.  Progress treatment as tolerated.       Precautions:   Patient Active Problem List   Diagnosis    Abnormal electromyogram    Benign essential hypertension    BPH (benign prostatic hyperplasia)    DDD (degenerative disc disease), lumbosacral    Diastasis recti    Impaired fasting glucose    Other male erectile dysfunction    Low HDL (under 40)    Snoring    Spinal stenosis of lumbar region    Bilateral primary osteoarthritis of knee    Hypovolemia    Hyponatremia    Obesity, morbid (HCC)     Daily Treatment Diary      Visit # 11 12 13 14 5 6 7 8 9 10   Assessment 4/8  4/10 4/15  4/17  4/22 4/24 4/29 5/1     Eval/Reval                 FOTO        NV 42/58       Manuals    L GH Mobs: Dist, Inf, Post  SE  SE np MD LARIOS SE SE SE      L Shoulder PROM    MD LARIOS SE SE SE                  Prescribed POC    Pt Education                       HEP Issued/Updated SE                     Pec/Biceps Stretch with MHP To Begin 5' 5'  5'  With CP to conlude   5' 5'        Pulleys with L Shoulder MHP  10\"x5 min 10\"x5 min  20\"x5 min  20\"x5 min  With MHP  20\"x5 min  With MHP 20\"x5 min w/MHP 20\"x5 min " "w/MHP 20\"x5 min      Table Slides: Flexion & Scaption  5\"  2x10 ea Table elevated to 25*  5\"x10 ea  5\"x10 each  HEP  HEP         Table ER Stretch 20\"x4 20\"x4 20\"x4  HEP HEP         H/L Wand Press-Ups  2#  5\"  2x10 2#  5\"  2x10 np  2#  5\"  2x10          H/L Wand Flexion  5\"  1x10 5\"  1x15 np  2#  5\"  2x10 Standing NV Standing  2#  5\"  1x10 Standing  2#  5\"  1x10 Standing  2#  5\"  1x15      Flexion Wall Slides        NV 5\"x10 5\"x10 5\"x10      TB Rows     Pink   5\"   1x10  Pink   5\"   1x10 Pink  5\"  1x10  1x5   Pink  5\"  1x10  1x5 Green  5\"  2x10      TB Ext     Pink   5\"   1x10  Pink   5\"   1x10 Pink  5\"  1x10  1x5 Pink  1x10  1x5 Green  5\"  2x10      TB Add     Pink   5\"   1x10  Pink   5\"   1x10 Pink  5\"  1x10 Pink  5\"  1x10 Pink  5\"  1x10      Scap Retractions   5\"x30 5\"x30  HEP  HEP         Low Trap Retractions with B Shoulder ER  OTB  5\"  1x10  OTB  5\"  1x10 OTB  5\"  1x10               TB rows  OTB  5\"x15                        Pec/Biceps Stretch with CP To Conclude  5'  5'     5' 5' 5'        prone scap retractions  5\"x10  prone scap retractions  5\"x10  np                 S/L ER AROM  5\"x10  np                 Modalities   MHP + Pec/Biceps Stretch  5'  5' pre-tx  5' pre-tx  5' w/Pulleys  5' w/pulleys 5' w/pulleys 5' w/pulleys np     CP +Pec/Biceps Stretch 5' 10' post tx np 10' Seated 5' w/pec stretch 5' w/pec stretch 5' w/pec stretch np     QR Code:      Access Code: [email or web address]  URL: [email or web address]  Date: 05/01/2025  Prepared by: Sherry Mclean    Exercises  - Supine Single Arm Pectoralis Stretch  - 1-2 x daily - 31 sets - 3 reps - 15 hold  - Seated Shoulder Flexion Towel Slide at Table Top Full Range of Motion  - 1-2 x daily - 1 sets - 10 reps - 5 hold  - Seated Shoulder Scaption Slide at Table Top with Forearm in Neutral  - 1-2 x daily - 1 sets - 10 reps - 5 hold  - Seated Shoulder External Rotation PROM on Table  - 1-2 x daily - 1 sets - 3 reps - 10 hold  - Seated Scapular Retraction  - 1-2 x " daily - 1 sets - 10 reps - 5 hold  - Shoulder External Rotation and Scapular Retraction  - 1 x daily - 1 sets - 10 reps - 5 hold  - Supine Shoulder Flexion with Dowel  - 1 x daily - 1 sets - 10 reps - 5 hold  - Supine Shoulder Press AAROM in Abduction with Dowel  - 1 x daily - 2 sets - 10 reps - 5 hold  - Standing Shoulder Row with Anchored Resistance  - 1 x daily - 2 sets - 10 reps - 5 hold  - Shoulder extension with resistance - Neutral  - 1 x daily - 2 sets - 10 reps - 5 hold    Patient Education  - Ice

## 2025-05-02 NOTE — TELEPHONE ENCOUNTER
I did call in discuss therapy expectations with the patient he will call if he has any further questions.

## 2025-05-06 ENCOUNTER — OFFICE VISIT (OUTPATIENT)
Dept: PHYSICAL THERAPY | Facility: REHABILITATION | Age: 70
End: 2025-05-06
Payer: MEDICARE

## 2025-05-06 DIAGNOSIS — M25.512 CHRONIC LEFT SHOULDER PAIN: Primary | ICD-10-CM

## 2025-05-06 DIAGNOSIS — G89.29 CHRONIC LEFT SHOULDER PAIN: Primary | ICD-10-CM

## 2025-05-06 PROCEDURE — 97110 THERAPEUTIC EXERCISES: CPT

## 2025-05-06 PROCEDURE — 97112 NEUROMUSCULAR REEDUCATION: CPT

## 2025-05-06 PROCEDURE — 97140 MANUAL THERAPY 1/> REGIONS: CPT

## 2025-05-06 NOTE — PROGRESS NOTES
Daily Note     Today's date: 2025  Patient name: Markie Barrios  : 1955  MRN: [id number]  Referring provider: Michael Huitron*  Dx:   Encounter Diagnosis     ICD-10-CM    1. Chronic left shoulder pain  M25.512     G89.29                      Subjective: Pt reports that his R shoulder is feeling good, his L shoulder is a little painful, clunky and restrictive with ROM, particularly into abduction.    Objective: See treatment diary below.     Assessment: Pt with good tolerance to treatment. Max cues for relaxation during manual techniques, pt with improved L shoulder mobility upon completion. Postural cues during TB strengthening with good carryover. Instructed pt to complete TB adduction in pain free ROM to which he verbalizes understanding. Appropriate challenge and fatigue noted with exercise completion, denies pain post treatment. Will continue to progress as able. Pt would benefit from continued skilled PT to improve current deficits and maximize function.    Plan: Continue per plan of care.  Progress treatment as tolerated.       Precautions:   Patient Active Problem List   Diagnosis    Abnormal electromyogram    Benign essential hypertension    BPH (benign prostatic hyperplasia)    DDD (degenerative disc disease), lumbosacral    Diastasis recti    Impaired fasting glucose    Other male erectile dysfunction    Low HDL (under 40)    Snoring    Spinal stenosis of lumbar region    Bilateral primary osteoarthritis of knee    Hypovolemia    Hyponatremia    Obesity, morbid (HCC)     Daily Treatment Diary      Visit # 11 12 13 14 5 6 7 8 9 10   Assessment 4/8  4/10 4/15  4/17  4/22 4/24 4/29 5/1 5/6    Eval/Reval                 FOTO        NV 42/58       Manuals    L GH Mobs: Dist, Inf, Post  SE  SE np MD LARIOS SE SE SE SE     L Shoulder PROM    MD LARIOS SE SE SE SE                 Prescribed POC    Pt Education                       HEP Issued/Updated SE                     Pec/Biceps Stretch with  "MHP To Begin 5' 5'  5'  With CP to conlude   5' 5'        Pulleys with L Shoulder MHP  10\"x5 min 10\"x5 min  20\"x5 min  20\"x5 min  With MHP  20\"x5 min  With MHP 20\"x5 min w/MHP 20\"x5 min w/MHP 20\"x5 min 20\"x5 min     Table Slides: Flexion & Scaption  5\"  2x10 ea Table elevated to 25*  5\"x10 ea  5\"x10 each  HEP  HEP         Table ER Stretch 20\"x4 20\"x4 20\"x4  HEP HEP         H/L Wand Press-Ups  2#  5\"  2x10 2#  5\"  2x10 np  2#  5\"  2x10          H/L Wand Flexion  5\"  1x10 5\"  1x15 np  2#  5\"  2x10 Standing NV Standing  2#  5\"  1x10 Standing  2#  5\"  1x10 Standing  2#  5\"  1x15 Standing  3#  5\"  1x10     Flexion Wall Slides        NV 5\"x10 5\"x10 5\"x10 5\"x5     TB Rows     Pink   5\"   1x10  Pink   5\"   1x10 Pink  5\"  1x10  1x5   Pink  5\"  1x10  1x5 Green  5\"  2x10 Green  5\"  2x10     TB Ext     Pink   5\"   1x10  Pink   5\"   1x10 Pink  5\"  1x10  1x5 Pink  1x10  1x5 Green  5\"  2x10 Green  5\"  2x10     TB Add     Pink   5\"   1x10  Pink   5\"   1x10 Pink  5\"  1x10 Pink  5\"  1x10 Pink  5\"  1x10 Pink  5\"  1x10     Scap Retractions   5\"x30 5\"x30  HEP  HEP         Low Trap Retractions with B Shoulder ER  OTB  5\"  1x10  OTB  5\"  1x10 OTB  5\"  1x10               TB rows  OTB  5\"x15                        Pec/Biceps Stretch with CP To Conclude  5'  5'     5' 5' 5'        prone scap retractions  5\"x10  prone scap retractions  5\"x10  np                 S/L ER AROM  5\"x10  np                 Modalities   MHP + Pec/Biceps Stretch  5'  5' pre-tx  5' pre-tx  5' w/Pulleys  5' w/pulleys 5' w/pulleys 5' w/pulleys np 5' w/pulleys    CP +Pec/Biceps Stretch 5' 10' post tx np 10' Seated 5' w/pec stretch 5' w/pec stretch 5' w/pec stretch np     QR Code:      Access Code: [email or web address]  URL: [email or web address]  Date: 05/01/2025  Prepared by: Sherry Mclean    Exercises  - Supine Single Arm Pectoralis Stretch  - 1-2 x daily - 31 sets - 3 reps - 15 hold  - Seated Shoulder Flexion Towel Slide at Table Top Full Range of Motion  - 1-2 x " daily - 1 sets - 10 reps - 5 hold  - Seated Shoulder Scaption Slide at Table Top with Forearm in Neutral  - 1-2 x daily - 1 sets - 10 reps - 5 hold  - Seated Shoulder External Rotation PROM on Table  - 1-2 x daily - 1 sets - 3 reps - 10 hold  - Seated Scapular Retraction  - 1-2 x daily - 1 sets - 10 reps - 5 hold  - Shoulder External Rotation and Scapular Retraction  - 1 x daily - 1 sets - 10 reps - 5 hold  - Supine Shoulder Flexion with Dowel  - 1 x daily - 1 sets - 10 reps - 5 hold  - Supine Shoulder Press AAROM in Abduction with Dowel  - 1 x daily - 2 sets - 10 reps - 5 hold  - Standing Shoulder Row with Anchored Resistance  - 1 x daily - 2 sets - 10 reps - 5 hold  - Shoulder extension with resistance - Neutral  - 1 x daily - 2 sets - 10 reps - 5 hold    Patient Education  - Ice

## 2025-05-08 ENCOUNTER — OFFICE VISIT (OUTPATIENT)
Dept: PHYSICAL THERAPY | Facility: REHABILITATION | Age: 70
End: 2025-05-08
Payer: MEDICARE

## 2025-05-08 DIAGNOSIS — M25.512 CHRONIC LEFT SHOULDER PAIN: Primary | ICD-10-CM

## 2025-05-08 DIAGNOSIS — G89.29 CHRONIC LEFT SHOULDER PAIN: Primary | ICD-10-CM

## 2025-05-08 PROCEDURE — 97140 MANUAL THERAPY 1/> REGIONS: CPT

## 2025-05-08 PROCEDURE — 97112 NEUROMUSCULAR REEDUCATION: CPT

## 2025-05-08 PROCEDURE — 97110 THERAPEUTIC EXERCISES: CPT

## 2025-05-08 NOTE — PROGRESS NOTES
"Daily Note     Today's date: 2025  Patient name: Markie Barrios  : 1955  MRN: [id number]  Referring provider: Michael Huitron*  Dx:   Encounter Diagnosis     ICD-10-CM    1. Chronic left shoulder pain  M25.512     G89.29                      Subjective: Pt reports that his L shoulder is feeling a little better today.    Objective: See treatment diary below.     Assessment: Pt with good tolerance to treatment. Mod postural cuing during TE with fair carryover. Max cues for relaxation during manual techniques with pt presenting very guarded on this date. Improved tolerance to pec stretch on this date. Will continue to progress as able. Pt would benefit from continued skilled PT to improve current deficits and maximize function.    Plan: Continue per plan of care.  Progress treatment as tolerated.       Precautions:   Patient Active Problem List   Diagnosis    Abnormal electromyogram    Benign essential hypertension    BPH (benign prostatic hyperplasia)    DDD (degenerative disc disease), lumbosacral    Diastasis recti    Impaired fasting glucose    Other male erectile dysfunction    Low HDL (under 40)    Snoring    Spinal stenosis of lumbar region    Bilateral primary osteoarthritis of knee    Hypovolemia    Hyponatremia    Obesity, morbid (HCC)     Daily Treatment Diary      Visit # 11 12 13 14 5 6 7 8 9 10   Assessment 4/8  4/10 4/15  4/17  4/22 4/24 4/29 5/1 5/6 5/8   Eval/Reval                 FOTO        NV 42/       Manuals    L GH Mobs: Dist, Inf, Post  SE  SE np MD LARIOS SE SE SE SE SE    L Shoulder PROM    MD LARIOS SE SE SE SE SE                Prescribed POC    Pt Education                       HEP Issued/Updated SE                     Pec/Biceps Stretch with MHP To Begin 5' 5'  5'  With CP to conlude   5' 5'        Pulleys with L Shoulder MHP  10\"x5 min 10\"x5 min  20\"x5 min  20\"x5 min  With MHP  20\"x5 min  With MHP 20\"x5 min w/MHP 20\"x5 min w/MHP 20\"x5 min 20\"x5 min 20\"x5 min    Table " "Slides: Flexion & Scaption  5\"  2x10 ea Table elevated to 25*  5\"x10 ea  5\"x10 each  HEP  HEP         Table ER Stretch 20\"x4 20\"x4 20\"x4  HEP HEP         H/L Wand Press-Ups  2#  5\"  2x10 2#  5\"  2x10 np  2#  5\"  2x10          H/L Wand Flexion  5\"  1x10 5\"  1x15 np  2#  5\"  2x10 Standing NV Standing  2#  5\"  1x10 Standing  2#  5\"  1x10 Standing  2#  5\"  1x15 Standing  3#  5\"  1x10 Standing  3#  5\"  1x8  *fatigue    Flexion Wall Slides        NV 5\"x10 5\"x10 5\"x10 5\"x5 5\"x10    TB Rows     Pink   5\"   1x10  Pink   5\"   1x10 Pink  5\"  1x10  1x5   Pink  5\"  1x10  1x5 Green  5\"  2x10 Green  5\"  2x10 Green  5\"  2x10    TB Ext     Pink   5\"   1x10  Pink   5\"   1x10 Pink  5\"  1x10  1x5 Pink  1x10  1x5 Green  5\"  2x10 Green  5\"  2x10 Green  5\"  2x10    TB Add     Pink   5\"   1x10  Pink   5\"   1x10 Pink  5\"  1x10 Pink  5\"  1x10 Pink  5\"  1x10 Pink  5\"  1x10 Pink  5\"  1x10  1x5    Scap Retractions   5\"x30 5\"x30  HEP  HEP         Low Trap Retractions with B Shoulder ER  OTB  5\"  1x10  OTB  5\"  1x10 OTB  5\"  1x10               TB rows  OTB  5\"x15                        Pec/Biceps Stretch with CP To Conclude  5'  5'     5' 5' 5'        prone scap retractions  5\"x10  prone scap retractions  5\"x10  np                 S/L ER AROM  5\"x10  np                 Modalities   MHP + Pec/Biceps Stretch  5'  5' pre-tx  5' pre-tx  5' w/Pulleys  5' w/pulleys 5' w/pulleys 5' w/pulleys np 5' w/pulleys 5' w/pulleys   CP +Pec/Biceps Stretch 5' 10' post tx np 10' Seated 5' w/pec stretch 5' w/pec stretch 5' w/pec stretch np     QR Code:      Access Code: [email or web address]  URL: [email or web address]  Date: 05/01/2025  Prepared by: Sherry Mclean    Exercises  - Supine Single Arm Pectoralis Stretch  - 1-2 x daily - 31 sets - 3 reps - 15 hold  - Seated Shoulder Flexion Towel Slide at Table Top Full Range of Motion  - 1-2 x daily - 1 sets - 10 reps - 5 hold  - Seated Shoulder Scaption Slide at Table Top with Forearm in Neutral  - 1-2 x daily - 1 sets " - 10 reps - 5 hold  - Seated Shoulder External Rotation PROM on Table  - 1-2 x daily - 1 sets - 3 reps - 10 hold  - Seated Scapular Retraction  - 1-2 x daily - 1 sets - 10 reps - 5 hold  - Shoulder External Rotation and Scapular Retraction  - 1 x daily - 1 sets - 10 reps - 5 hold  - Supine Shoulder Flexion with Dowel  - 1 x daily - 1 sets - 10 reps - 5 hold  - Supine Shoulder Press AAROM in Abduction with Dowel  - 1 x daily - 2 sets - 10 reps - 5 hold  - Standing Shoulder Row with Anchored Resistance  - 1 x daily - 2 sets - 10 reps - 5 hold  - Shoulder extension with resistance - Neutral  - 1 x daily - 2 sets - 10 reps - 5 hold    Patient Education  - Ice

## 2025-05-12 DIAGNOSIS — I10 BENIGN ESSENTIAL HYPERTENSION: ICD-10-CM

## 2025-05-12 RX ORDER — HYDROCHLOROTHIAZIDE 25 MG/1
25 TABLET ORAL DAILY
Qty: 90 TABLET | Refills: 3 | Status: SHIPPED | OUTPATIENT
Start: 2025-05-12

## 2025-05-12 RX ORDER — ENALAPRIL MALEATE 20 MG/1
20 TABLET ORAL 2 TIMES DAILY
Qty: 180 TABLET | Refills: 3 | Status: SHIPPED | OUTPATIENT
Start: 2025-05-12

## 2025-05-13 ENCOUNTER — OFFICE VISIT (OUTPATIENT)
Dept: PHYSICAL THERAPY | Facility: REHABILITATION | Age: 70
End: 2025-05-13
Payer: MEDICARE

## 2025-05-13 DIAGNOSIS — G89.29 CHRONIC LEFT SHOULDER PAIN: Primary | ICD-10-CM

## 2025-05-13 DIAGNOSIS — M25.512 CHRONIC LEFT SHOULDER PAIN: Primary | ICD-10-CM

## 2025-05-13 PROCEDURE — 97110 THERAPEUTIC EXERCISES: CPT

## 2025-05-13 PROCEDURE — 97140 MANUAL THERAPY 1/> REGIONS: CPT

## 2025-05-13 PROCEDURE — 97112 NEUROMUSCULAR REEDUCATION: CPT

## 2025-05-13 NOTE — PROGRESS NOTES
"Daily Note     Today's date: 2025  Patient name: Markie Barrios  : 1955  MRN: [id number]  Referring provider: Michael Huitron*  Dx:   Encounter Diagnosis     ICD-10-CM    1. Chronic left shoulder pain  M25.512     G89.29                      Subjective: Pt reports he reached for something on his bedside table this morning with his L arm and it hurt. He continues to report he has most difficulty and pain reaching to the side and with forward flexion.    Objective: See treatment diary below.     Assessment: Pt with favorable response to manual techniques with improved L shoulder ROM and mobility upon completion. Good tolerance to TE, appropriate challenge and fatigue without adverse response. Will continue to progress as able. Pt would benefit from continued skilled PT to improve current deficits and maximize function.    Plan: Continue per plan of care.  Progress treatment as tolerated.       Precautions:   Patient Active Problem List   Diagnosis    Abnormal electromyogram    Benign essential hypertension    BPH (benign prostatic hyperplasia)    DDD (degenerative disc disease), lumbosacral    Diastasis recti    Impaired fasting glucose    Other male erectile dysfunction    Low HDL (under 40)    Snoring    Spinal stenosis of lumbar region    Bilateral primary osteoarthritis of knee    Hypovolemia    Hyponatremia    Obesity, morbid (HCC)     Daily Treatment Diary      Visit # 11 12 13 14 15 6 7 8 9 10   Assessment 5/13  4/10 4/15  4/17  4/22 4/24 4/29 5/1 5/6 5/8   Eval/Reval                 FOTO        NV 42/58       Manuals    L GH Mobs: Dist, Inf, Post  SE  SE np MD LARIOS SE SE SE SE SE    L Shoulder PROM SE   MD LARIOS SE SE SE SE SE                Prescribed POC    Pt Education                       HEP Issued/Updated                      Pec/Biceps Stretch with MHP To Begin  5'  5'  With CP to conlude   5' 5'        Pulleys with L Shoulder MHP 20\"x5 min 10\"x5 min  20\"x5 min  20\"x5 min  With MHP " " 20\"x5 min  With MHP 20\"x5 min w/MHP 20\"x5 min w/MHP 20\"x5 min 20\"x5 min 20\"x5 min    Table Slides: Flexion & Scaption  Table elevated to 25*  5\"x10 ea  5\"x10 each  HEP  HEP         Table ER Stretch  20\"x4 20\"x4  HEP HEP         H/L Wand Press-Ups  2#  5\"  2x10 np  2#  5\"  2x10          H/L Wand Flexion Standing  3#  5\"  1x10 5\"  1x15 np  2#  5\"  2x10 Standing NV Standing  2#  5\"  1x10 Standing  2#  5\"  1x10 Standing  2#  5\"  1x15 Standing  3#  5\"  1x10 Standing  3#  5\"  1x8  *fatigue    Flexion Wall Slides 5\"x10      NV 5\"x10 5\"x10 5\"x10 5\"x5 5\"x10    TB Rows Green  5\"  2x10    Pink   5\"   1x10  Pink   5\"   1x10 Pink  5\"  1x10  1x5   Pink  5\"  1x10  1x5 Green  5\"  2x10 Green  5\"  2x10 Green  5\"  2x10    TB Ext Green  5\"  2x10    Pink   5\"   1x10  Pink   5\"   1x10 Pink  5\"  1x10  1x5 Pink  1x10  1x5 Green  5\"  2x10 Green  5\"  2x10 Green  5\"  2x10    TB Add Pink  5\"  1x10  1x5    Pink   5\"   1x10  Pink   5\"   1x10 Pink  5\"  1x10 Pink  5\"  1x10 Pink  5\"  1x10 Pink  5\"  1x10 Pink  5\"  1x10  1x5    Scap Retractions        5\"x30 5\"x30  HEP  HEP         Low Trap Retractions with B Shoulder ER   OTB  5\"  1x10 OTB  5\"  1x10               TB rows  OTB  5\"x15                        Pec/Biceps Stretch with CP To Conclude   5'     5' 5' 5'         prone scap retractions  5\"x10  np                 S/L ER AROM  5\"x10  np                 Modalities   MHP + Pec/Biceps Stretch   5' pre-tx  5' pre-tx  5' w/Pulleys  5' w/pulleys 5' w/pulleys 5' w/pulleys np 5' w/pulleys 5' w/pulleys   CP +Pec/Biceps Stretch 5' 10' post tx np 10' Seated 5' w/pec stretch 5' w/pec stretch 5' w/pec stretch np     QR Code:      Access Code: [email or web address]  URL: [email or web address]  Date: 05/01/2025  Prepared by: Sherry Mclean    Exercises  - Supine Single Arm Pectoralis Stretch  - 1-2 x daily - 31 sets - 3 reps - 15 hold  - Seated Shoulder Flexion Towel Slide at Table Top Full Range of Motion  - 1-2 x daily - 1 sets - 10 reps - 5 hold  - Seated " Shoulder Scaption Slide at Table Top with Forearm in Neutral  - 1-2 x daily - 1 sets - 10 reps - 5 hold  - Seated Shoulder External Rotation PROM on Table  - 1-2 x daily - 1 sets - 3 reps - 10 hold  - Seated Scapular Retraction  - 1-2 x daily - 1 sets - 10 reps - 5 hold  - Shoulder External Rotation and Scapular Retraction  - 1 x daily - 1 sets - 10 reps - 5 hold  - Supine Shoulder Flexion with Dowel  - 1 x daily - 1 sets - 10 reps - 5 hold  - Supine Shoulder Press AAROM in Abduction with Dowel  - 1 x daily - 2 sets - 10 reps - 5 hold  - Standing Shoulder Row with Anchored Resistance  - 1 x daily - 2 sets - 10 reps - 5 hold  - Shoulder extension with resistance - Neutral  - 1 x daily - 2 sets - 10 reps - 5 hold    Patient Education  - Ice

## 2025-05-15 ENCOUNTER — OFFICE VISIT (OUTPATIENT)
Dept: PHYSICAL THERAPY | Facility: REHABILITATION | Age: 70
End: 2025-05-15
Payer: MEDICARE

## 2025-05-15 DIAGNOSIS — M25.512 CHRONIC LEFT SHOULDER PAIN: Primary | ICD-10-CM

## 2025-05-15 DIAGNOSIS — G89.29 CHRONIC LEFT SHOULDER PAIN: Primary | ICD-10-CM

## 2025-05-15 PROCEDURE — 97110 THERAPEUTIC EXERCISES: CPT

## 2025-05-15 PROCEDURE — 97112 NEUROMUSCULAR REEDUCATION: CPT

## 2025-05-15 PROCEDURE — 97140 MANUAL THERAPY 1/> REGIONS: CPT

## 2025-05-15 NOTE — PROGRESS NOTES
"Daily Note     Today's date: 5/15/2025  Patient name: Markie Barrios  : 1955  MRN: [id number]  Referring provider: Michael Huitron*  Dx:   Encounter Diagnosis     ICD-10-CM    1. Chronic left shoulder pain  M25.512     G89.29                      Subjective: Pt reports his shoulder is better today, he can reach out to his side without any pain.    Objective: See treatment diary below.     Assessment: Pt with decreased pain with completion of manuals, improved motion in all planes on this date. Favorable response to manual techniques with improved L shoulder ROM and mobility upon completion. Progressed exercises to appropriate challenge and fatigue without adverse response. Will continue to progress as able. Pt would like to attempt to transition to HEP as he is feeling good and managing his symptoms well at home. He is agreeable to coming back in 2 weeks for one more follow up.    Plan: Potential discharge next visit.     Precautions:   Patient Active Problem List   Diagnosis    Abnormal electromyogram    Benign essential hypertension    BPH (benign prostatic hyperplasia)    DDD (degenerative disc disease), lumbosacral    Diastasis recti    Impaired fasting glucose    Other male erectile dysfunction    Low HDL (under 40)    Snoring    Spinal stenosis of lumbar region    Bilateral primary osteoarthritis of knee    Hypovolemia    Hyponatremia    Obesity, morbid (HCC)     Daily Treatment Diary      Visit # 11 12 13 14 15 6 7 8 9 10   Assessment 5/13  5/15 4/15  4/17  4/22 4/24 4/29 5/1 5/6 5/8   Eval/Reval                 FOTO        NV 42/58       Manuals    L GH Mobs: Dist, Inf, Post  SE  SE np MD LARIOS SE SE SE SE SE    L Shoulder PROM SE SE  MD LARIOS SE SE SE SE SE                Prescribed POC    Pt Education                       HEP Issued/Updated                      Pec/Biceps Stretch with MHP To Begin   5'  With CP to conlude   5' 5'        Pulleys with L Shoulder MHP 20\"x5 min 20\"x5 min  20\"x5 " "min  20\"x5 min  With MHP  20\"x5 min  With MHP 20\"x5 min w/MHP 20\"x5 min w/MHP 20\"x5 min 20\"x5 min 20\"x5 min    Table Slides: Flexion & Scaption    5\"x10 each  HEP  HEP         Table ER Stretch   20\"x4  HEP HEP         H/L Wand Press-Ups   np  2#  5\"  2x10          H/L Wand Flexion Standing  3#  5\"  1x10 Standing  3#  5\"  1x10 np  2#  5\"  2x10 Standing NV Standing  2#  5\"  1x10 Standing  2#  5\"  1x10 Standing  2#  5\"  1x15 Standing  3#  5\"  1x10 Standing  3#  5\"  1x8  *fatigue    Flexion Wall Slides 5\"x10 5\"x10     NV 5\"x10 5\"x10 5\"x10 5\"x5 5\"x10    TB Rows Green  5\"  2x10 Blue  5\"  2x10   Pink   5\"   1x10  Pink   5\"   1x10 Pink  5\"  1x10  1x5   Pink  5\"  1x10  1x5 Green  5\"  2x10 Green  5\"  2x10 Green  5\"  2x10    TB Ext Green  5\"  2x10 Blue  5\"  2x10   Pink   5\"   1x10  Pink   5\"   1x10 Pink  5\"  1x10  1x5 Pink  1x10  1x5 Green  5\"  2x10 Green  5\"  2x10 Green  5\"  2x10    TB Add Pink  5\"  1x10  1x5 Pink  5\"  1x10  1x5   Pink   5\"   1x10  Pink   5\"   1x10 Pink  5\"  1x10 Pink  5\"  1x10 Pink  5\"  1x10 Pink  5\"  1x10 Pink  5\"  1x10  1x5    Scap Retractions         5\"x30  HEP  HEP         Low Trap Retractions with B Shoulder ER   OTB  5\"  1x10               TB rows  OTB  5\"x15                        Pec/Biceps Stretch with CP To Conclude       5' 5' 5'          np                  np                 Modalities   MHP + Pec/Biceps Stretch    5' pre-tx  5' w/Pulleys  5' w/pulleys 5' w/pulleys 5' w/pulleys np 5' w/pulleys 5' w/pulleys   CP +Pec/Biceps Stretch 5' 5' np 10' Seated 5' w/pec stretch 5' w/pec stretch 5' w/pec stretch np     QR Code:      Access Code: [email or web address]  URL: [email or web address]  Date: 05/01/2025  Prepared by: Sherry Mclean    Exercises  - Supine Single Arm Pectoralis Stretch  - 1-2 x daily - 31 sets - 3 reps - 15 hold  - Seated Shoulder Flexion Towel Slide at Table Top Full Range of Motion  - 1-2 x daily - 1 sets - 10 reps - 5 hold  - Seated Shoulder Scaption Slide at Table Top with " Forearm in Neutral  - 1-2 x daily - 1 sets - 10 reps - 5 hold  - Seated Shoulder External Rotation PROM on Table  - 1-2 x daily - 1 sets - 3 reps - 10 hold  - Seated Scapular Retraction  - 1-2 x daily - 1 sets - 10 reps - 5 hold  - Shoulder External Rotation and Scapular Retraction  - 1 x daily - 1 sets - 10 reps - 5 hold  - Supine Shoulder Flexion with Dowel  - 1 x daily - 1 sets - 10 reps - 5 hold  - Supine Shoulder Press AAROM in Abduction with Dowel  - 1 x daily - 2 sets - 10 reps - 5 hold  - Standing Shoulder Row with Anchored Resistance  - 1 x daily - 2 sets - 10 reps - 5 hold  - Shoulder extension with resistance - Neutral  - 1 x daily - 2 sets - 10 reps - 5 hold    Patient Education  - Ice

## 2025-05-28 ENCOUNTER — OFFICE VISIT (OUTPATIENT)
Dept: PHYSICAL THERAPY | Facility: REHABILITATION | Age: 70
End: 2025-05-28
Payer: MEDICARE

## 2025-05-28 DIAGNOSIS — M25.512 CHRONIC LEFT SHOULDER PAIN: Primary | ICD-10-CM

## 2025-05-28 DIAGNOSIS — G89.29 CHRONIC LEFT SHOULDER PAIN: Primary | ICD-10-CM

## 2025-05-28 PROCEDURE — 97140 MANUAL THERAPY 1/> REGIONS: CPT

## 2025-05-28 PROCEDURE — 97110 THERAPEUTIC EXERCISES: CPT

## 2025-05-28 NOTE — PROGRESS NOTES
Daily Note     Today's date: 2025  Patient name: Markie Barrios  : 1955  MRN: [id number]  Referring provider: Michael Huitron*  Dx:   Encounter Diagnosis     ICD-10-CM    1. Chronic left shoulder pain  M25.512     G89.29                      Subjective: Pt reports his L shoulder is feeling the same since lv and he feels as if he can manage it on his own at home.     He presents to therapy today reporting he was doing his exercises on ,  without issue. Later that evening he reached for something and felt a sharp pain in his R bicep, followed 5 days later by significant bruising. He reports pain with reaching and sharp pain in near full active elbow flexion.    Objective: See treatment diary below.   Significant effuse R bicep ecchymosis, edema bulge similar to viridiana deformity noted during today's visit.   Reviewed current HEP to which pt verbalizes understanding, no questions or concerns at this time.    Assessment: Pt with favorable response to manual techniques performed with improved L shoulder mobility and ROM upon completion. Reviewed exercises in current HEP, to which pt verbalizes and demonstrates good understanding. Will transition to HEP for L shoulder as pt demonstrates independence with HEP. Pt advised by PTA and primary PT to call ortho and schedule an appt for R arm, and he verbalizes agreement with this plan.    Plan: D/C to HEP, pt will call with questions/concerns.     Precautions:   Patient Active Problem List   Diagnosis    Abnormal electromyogram    Benign essential hypertension    BPH (benign prostatic hyperplasia)    DDD (degenerative disc disease), lumbosacral    Diastasis recti    Impaired fasting glucose    Other male erectile dysfunction    Low HDL (under 40)    Snoring    Spinal stenosis of lumbar region    Bilateral primary osteoarthritis of knee    Hypovolemia    Hyponatremia    Obesity, morbid (HCC)     Daily Treatment Diary      Visit # 11 12 13 14 15  "6 7 8 9 10   Assessment 5/13  5/15 5/28  4/17  4/22 4/24 4/29 5/1 5/6 5/8   Eval/Reval                 FOTO        NV 42/58       Manuals    L GH Mobs: Dist, Inf, Post  SE  SE SE MD LARIOS SE SE SE SE SE    L Shoulder PROM SE SE SE MD LARIOS SE SE SE SE SE                Prescribed POC    Pt Education                       HEP Issued/Updated                      Pec/Biceps Stretch with MHP To Begin     With CP to conlude   5' 5'        Pulleys with L Shoulder MHP 20\"x5 min 20\"x5 min reviewed  20\"x5 min  With MHP  20\"x5 min  With MHP 20\"x5 min w/MHP 20\"x5 min w/MHP 20\"x5 min 20\"x5 min 20\"x5 min    Table Slides: Flexion & Scaption     HEP  HEP         Table ER Stretch     HEP HEP         H/L Wand Press-Ups     2#  5\"  2x10          H/L Wand Flexion Standing  3#  5\"  1x10 Standing  3#  5\"  1x10   2#  5\"  2x10 Standing NV Standing  2#  5\"  1x10 Standing  2#  5\"  1x10 Standing  2#  5\"  1x15 Standing  3#  5\"  1x10 Standing  3#  5\"  1x8  *fatigue    Flexion Wall Slides 5\"x10 5\"x10 reviewed    NV 5\"x10 5\"x10 5\"x10 5\"x5 5\"x10    TB Rows Green  5\"  2x10 Blue  5\"  2x10 Reviewed  Pink   5\"   1x10  Pink   5\"   1x10 Pink  5\"  1x10  1x5   Pink  5\"  1x10  1x5 Green  5\"  2x10 Green  5\"  2x10 Green  5\"  2x10    TB Ext Green  5\"  2x10 Blue  5\"  2x10 reviewed  Pink   5\"   1x10  Pink   5\"   1x10 Pink  5\"  1x10  1x5 Pink  1x10  1x5 Green  5\"  2x10 Green  5\"  2x10 Green  5\"  2x10    TB Add Pink  5\"  1x10  1x5 Pink  5\"  1x10  1x5 reviewed  Pink   5\"   1x10  Pink   5\"   1x10 Pink  5\"  1x10 Pink  5\"  1x10 Pink  5\"  1x10 Pink  5\"  1x10 Pink  5\"  1x10  1x5    Scap Retractions           HEP  HEP         Low Trap Retractions with B Shoulder ER                                          Pec/Biceps Stretch with CP To Conclude       5' 5' 5'          np                  np                 Modalities   MHP + Pec/Biceps Stretch    5' pre-tx  5' w/Pulleys  5' w/pulleys 5' w/pulleys 5' w/pulleys np 5' w/pulleys 5' w/pulleys   CP +Pec/Biceps Stretch 5' 5' np 10' " Seated 5' w/pec stretch 5' w/pec stretch 5' w/pec stretch np     QR Code:    Access Code: [email or web address]  URL: [email or web address]  Date: 05/28/2025  Prepared by: Sherry Mclean    Exercises  - Supine Single Arm Pectoralis Stretch  - 1-2 x daily - 31 sets - 3 reps - 15 hold  - Seated Shoulder Flexion Towel Slide at Table Top Full Range of Motion  - 1-2 x daily - 1 sets - 10 reps - 5 hold  - Seated Shoulder Scaption Slide at Table Top with Forearm in Neutral  - 1-2 x daily - 1 sets - 10 reps - 5 hold  - Seated Shoulder External Rotation PROM on Table  - 1-2 x daily - 1 sets - 3 reps - 10 hold  - Seated Scapular Retraction  - 1-2 x daily - 1 sets - 10 reps - 5 hold  - Shoulder External Rotation and Scapular Retraction  - 1 x daily - 1 sets - 10 reps - 5 hold  - Supine Shoulder Press AAROM in Abduction with Dowel  - 1 x daily - 2 sets - 10 reps - 5 hold  - Standing Shoulder Row with Anchored Resistance  - 1 x daily - 2 sets - 10 reps - 5 hold  - Shoulder extension with resistance - Neutral  - 1 x daily - 2 sets - 10 reps - 5 hold  - Standing Shoulder Flexion AAROM with Dowel  - 1 x daily - 1 sets - 10 reps - 5 hold  - Shoulder Adduction with Anchored Resistance  - 1 x daily - 1 sets - 10 reps - 5 hold    Patient Education  - Ice

## 2025-06-01 DIAGNOSIS — N40.0 BENIGN PROSTATIC HYPERPLASIA, UNSPECIFIED WHETHER LOWER URINARY TRACT SYMPTOMS PRESENT: ICD-10-CM

## 2025-06-01 RX ORDER — TAMSULOSIN HYDROCHLORIDE 0.4 MG/1
CAPSULE ORAL
Qty: 270 CAPSULE | Refills: 1 | Status: SHIPPED | OUTPATIENT
Start: 2025-06-01

## 2025-06-04 ENCOUNTER — OFFICE VISIT (OUTPATIENT)
Dept: OBGYN CLINIC | Facility: OTHER | Age: 70
End: 2025-06-04
Payer: MEDICARE

## 2025-06-04 VITALS — HEIGHT: 74 IN | BODY MASS INDEX: 37.09 KG/M2 | WEIGHT: 289 LBS

## 2025-06-04 DIAGNOSIS — S46.211A RUPTURE OF RIGHT PROXIMAL BICEPS TENDON, INITIAL ENCOUNTER: Primary | ICD-10-CM

## 2025-06-04 PROCEDURE — 99213 OFFICE O/P EST LOW 20 MIN: CPT | Performed by: ORTHOPAEDIC SURGERY

## 2025-06-04 NOTE — PROGRESS NOTES
"Orthopaedic Surgery - Office Note  Markie Barrios (69 y.o. male)   : 1955   MRN: [id number]  Encounter Date: 2025    Assessment & Plan  Rupture of right proximal biceps tendon, initial encounter             Assessment / Plan  Proximal biceps tendon rupture    Activity as tolerated  Continue outpatient PT. Recommend avoiding aggravating exercises until current biceps pain subsides.  Discussed obtaining MRI if pain and symptoms worsen  Anti-inflammatories or Tylenol prn pain  Follow-up:  No follow-ups on file.      Chief Complaint / Date of Onset  Right biceps pain   Injury Mechanism / Date  After completing theraband exercises 2 weeks ago  Surgery / Date  None    History of Present Illness   Markie Barrios is a 69 y.o. male who presents for evaluation of right biceps pain. He recalls performing resistance band exercises while at home 2 weeks ago with no issues, and notes waking up the next day with pain and bruising along the upper arm. He does not recall a snapping or popping sensation while exercising. His current pain is aggravated with reaching away from the body and while applying pressure to the arm that is sharp in nature. He denies distal numbness and tingling.        Treatment Summary  Medications / Modalities  Over-the-counter Advil as needed  Bracing / Immobilization  None  Physical Therapy  Regularly over the last month  Injections  None  Prior Surgeries  R TKR 1 year ago with [facility]  Other Treatments  None    Employment / Current Status  Retired    Sport / Organization / Current Status  Active      Review of Systems  Pertinent items are noted in HPI.  All other systems were reviewed and are negative.      Physical Exam  Ht 6' 2\" (1.88 m)   Wt 131 kg (289 lb)   BMI 37.11 kg/m²   Cons: Appears well.  No apparent distress.  Psych: Alert. Oriented x3.  Mood and affect normal.  Eyes: PERRLA, EOMI  Resp: Normal effort.  No audible wheezing or stridor.  CV: Palpable pulse.  No " discernable arrhythmia.  No LE edema.  Lymph:  No palpable cervical, axillary, or inguinal lymphadenopathy.  Skin: Warm.  No palpable masses.  No visible lesions.  Neuro: Normal muscle tone.  Normal and symmetric DTR's.     Right Elbow Exam  Alignment:  Normal shoulder posture. Normal elbow alignment and carrying angle.  Inspection:  No swelling. No erythema. Moderate ecchymosis. No Artie's deformity.  Palpation:  Deep seated tenderness at proximal end of biceps muscle. Palpable lump   ROM:  Normal elbow ROM. Normal wrist ROM.  Strength:  Biceps 5/5. Triceps 5/5.  Stability:  No objective elbow instability.  Stable varus and valgus stress.  Tests:  No pertinent positive or negative tests.  Neurovascular:  Sensation intact in Ax/R/M/U nerve distributions. 2+ radial pulse.         Studies Reviewed  No studies to review      Procedures  No procedures today.    Medical, Surgical, Family, and Social History  The patient's medical history, family history, and social history, were reviewed and updated as appropriate.    Past Medical History[1]    Past Surgical History[2]    Family History[3]    Social History     Occupational History    Occupation: retired   Tobacco Use    Smoking status: Never     Passive exposure: Never    Smokeless tobacco: Never   Vaping Use    Vaping status: Never Used   Substance and Sexual Activity    Alcohol use: Not Currently     Comment: occasional    Drug use: Never    Sexual activity: Not Currently       Allergies[4]    Current Medications[5]      Von Roth    Scribe Attestation      I,:  Von Roth am acting as a scribe while in the presence of the attending physician.:       I,:  Robbi Flood MD personally performed the services described in this documentation    as scribed in my presence.:                  [1]   Past Medical History:  Diagnosis Date    BPH (benign prostatic hyperplasia)     Dyslipidemia     Hypertension     Impaired fasting glucose     Osteoarthritis of  both knees    [2]   Past Surgical History:  Procedure Laterality Date    COLONOSCOPY  11/23/2020    tubular adenoma; Dr Ross    REPLACEMENT TOTAL KNEE Right    [3]   Family History  Problem Relation Name Age of Onset    Hypertension Mother      Kidney disease Mother      No Known Problems Sister      No Known Problems Brother      No Known Problems Brother      No Known Problems Son      No Known Problems Son      No Known Problems Daughter     [4] No Known Allergies  [5]   Current Outpatient Medications:     enalapril (VASOTEC) 20 mg tablet, TAKE 1 TABLET TWICE A DAY, Disp: 180 tablet, Rfl: 3    finasteride (PROSCAR) 5 mg tablet, take 1 tablet by mouth once daily, Disp: 30 tablet, Rfl: 5    hydroCHLOROthiazide 25 mg tablet, TAKE 1 TABLET DAILY, Disp: 90 tablet, Rfl: 3    tamsulosin (FLOMAX) 0.4 mg, take 3 capsules by mouth once daily with DINNER, Disp: 270 capsule, Rfl: 1    sildenafil (VIAGRA) 50 MG tablet, Take 1 tablet (50 mg total) by mouth daily as needed for erectile dysfunction (Patient not taking: Reported on 10/8/2024), Disp: 10 tablet, Rfl: 3

## 2025-06-11 ENCOUNTER — OFFICE VISIT (OUTPATIENT)
Dept: PHYSICAL THERAPY | Facility: REHABILITATION | Age: 70
End: 2025-06-11
Payer: MEDICARE

## 2025-06-11 DIAGNOSIS — M25.512 CHRONIC LEFT SHOULDER PAIN: Primary | ICD-10-CM

## 2025-06-11 DIAGNOSIS — G89.29 CHRONIC LEFT SHOULDER PAIN: Primary | ICD-10-CM

## 2025-06-11 PROCEDURE — 97110 THERAPEUTIC EXERCISES: CPT

## 2025-06-11 PROCEDURE — 97140 MANUAL THERAPY 1/> REGIONS: CPT

## 2025-06-11 NOTE — PROGRESS NOTES
Daily Note     Today's date: 2025  Patient name: Markie Barrios  : 1955  MRN: [id number]  Referring provider: Michael Huitron*  Dx:   Encounter Diagnosis     ICD-10-CM    1. Chronic left shoulder pain  M25.512     G89.29                      Subjective: Pt returns afte brief hiatus from PT reporting his L shoulder is painful with limited motion again. He states he had not been doing his exercises and stretches due to recent R proximal biceps tendon tear. He further reports he is again having difficulty and pain with reaching out to the side, a little pain while reaching out to the front.    Objective: See treatment diary below.     Assessment: Pt with favorable response to manual techniques performed with improved L shoulder mobility and ROM upon completion. Resumed AAROM this visit to continue improving L shoulder ROM. Added scap retraction for posterior chain strength and stability. Pt with mild c/o pain throughout treatment, however is able to complete all activity. Instructed pt to continue working on ROM and mobility, light scap strengthening without TB at home until NV, to which he verbalizes understanding. Will continue to progress as able. Pt would benefit from continued skilled PT to improve current deficits and maximize function.    Plan: Progress program as tolerated.     Precautions:   Patient Active Problem List   Diagnosis    Abnormal electromyogram    Benign essential hypertension    BPH (benign prostatic hyperplasia)    DDD (degenerative disc disease), lumbosacral    Diastasis recti    Impaired fasting glucose    Other male erectile dysfunction    Low HDL (under 40)    Snoring    Spinal stenosis of lumbar region    Bilateral primary osteoarthritis of knee    Hypovolemia    Hyponatremia    Obesity, morbid (HCC)     Daily Treatment Diary      Visit # 11 12 13  15 6 7 8 9 10   Assessment 5/13  5/15 5/28  6/11  4/22 4/24 4/29 5/1 5/6 5/8   Eval/Reval                 FOTO         "NV 42/58       Manuals    L GH Mobs: Dist, Inf, Post  SE  SE SE SE MD SE SE SE SE SE    L Shoulder PROM SE SE SE SE MD SE SE SE SE SE                Prescribed POC    Pt Education                       HEP Issued/Updated                      Pec/Biceps Stretch with MHP To Begin     With CP to conlude   5' 5'       H/L pec stretch    15\"x4          Pulleys with L Shoulder MHP 20\"x5 min 20\"x5 min reviewed  5\"x5 min    20\"x5 min  With MHP 20\"x5 min w/MHP 20\"x5 min w/MHP 20\"x5 min 20\"x5 min 20\"x5 min    Table Slides: Flexion & Scaption    5\"x10 ea  HEP         Table ER Stretch    15\"x4 HEP         H/L Wand Press-Ups              H/L Wand Flexion Standing  3#  5\"  1x10 Standing  3#  5\"  1x10   Standing NV Standing  2#  5\"  1x10 Standing  2#  5\"  1x10 Standing  2#  5\"  1x15 Standing  3#  5\"  1x10 Standing  3#  5\"  1x8  *fatigue    Flexion Wall Slides 5\"x10 5\"x10 reviewed   NV 5\"x10 5\"x10 5\"x10 5\"x5 5\"x10    TB Rows Green  5\"  2x10 Blue  5\"  2x10 Reviewed   Pink   5\"   1x10 Pink  5\"  1x10  1x5   Pink  5\"  1x10  1x5 Green  5\"  2x10 Green  5\"  2x10 Green  5\"  2x10    TB Ext Green  5\"  2x10 Blue  5\"  2x10 reviewed   Pink   5\"   1x10 Pink  5\"  1x10  1x5 Pink  1x10  1x5 Green  5\"  2x10 Green  5\"  2x10 Green  5\"  2x10    TB Add Pink  5\"  1x10  1x5 Pink  5\"  1x10  1x5 reviewed   Pink   5\"   1x10 Pink  5\"  1x10 Pink  5\"  1x10 Pink  5\"  1x10 Pink  5\"  1x10 Pink  5\"  1x10  1x5    Scap Retractions          5\"  2x10  HEP         Low Trap Retractions with B Shoulder ER                                          Pec/Biceps Stretch with CP To Conclude       5' 5' 5'          np                  np                 Modalities   MHP + Pec/Biceps Stretch    5' pre-tx   5' w/pulleys 5' w/pulleys 5' w/pulleys np 5' w/pulleys 5' w/pulleys   CP +Pec/Biceps Stretch 5' 5' np  5' w/pec stretch 5' w/pec stretch 5' w/pec stretch np     QR Code:    Access Code: [email or web address]  URL: [email or web address]  Date: 05/28/2025  Prepared by: Sherry" Caridad    Exercises  - Supine Single Arm Pectoralis Stretch  - 1-2 x daily - 31 sets - 3 reps - 15 hold  - Seated Shoulder Flexion Towel Slide at Table Top Full Range of Motion  - 1-2 x daily - 1 sets - 10 reps - 5 hold  - Seated Shoulder Scaption Slide at Table Top with Forearm in Neutral  - 1-2 x daily - 1 sets - 10 reps - 5 hold  - Seated Shoulder External Rotation PROM on Table  - 1-2 x daily - 1 sets - 3 reps - 10 hold  - Seated Scapular Retraction  - 1-2 x daily - 1 sets - 10 reps - 5 hold  - Shoulder External Rotation and Scapular Retraction  - 1 x daily - 1 sets - 10 reps - 5 hold  - Supine Shoulder Press AAROM in Abduction with Dowel  - 1 x daily - 2 sets - 10 reps - 5 hold  - Standing Shoulder Row with Anchored Resistance  - 1 x daily - 2 sets - 10 reps - 5 hold  - Shoulder extension with resistance - Neutral  - 1 x daily - 2 sets - 10 reps - 5 hold  - Standing Shoulder Flexion AAROM with Dowel  - 1 x daily - 1 sets - 10 reps - 5 hold  - Shoulder Adduction with Anchored Resistance  - 1 x daily - 1 sets - 10 reps - 5 hold    Patient Education  - Ice

## 2025-06-18 ENCOUNTER — OFFICE VISIT (OUTPATIENT)
Dept: PHYSICAL THERAPY | Facility: REHABILITATION | Age: 70
End: 2025-06-18
Payer: MEDICARE

## 2025-06-18 DIAGNOSIS — M25.512 CHRONIC LEFT SHOULDER PAIN: Primary | ICD-10-CM

## 2025-06-18 DIAGNOSIS — G89.29 CHRONIC LEFT SHOULDER PAIN: Primary | ICD-10-CM

## 2025-06-18 PROCEDURE — 97112 NEUROMUSCULAR REEDUCATION: CPT

## 2025-06-18 PROCEDURE — 97140 MANUAL THERAPY 1/> REGIONS: CPT

## 2025-06-18 PROCEDURE — 97110 THERAPEUTIC EXERCISES: CPT

## 2025-06-18 NOTE — PROGRESS NOTES
Daily Note     Today's date: 2025  Patient name: Markie Barrios  : 1955  MRN: [id number]  Referring provider: Michael Huitron*  Dx:   Encounter Diagnosis     ICD-10-CM    1. Chronic left shoulder pain  M25.512     G89.29                      Subjective: Pt comes to therapy reporting that his L shoulder continues to be bothersome and painful particularly with reaching out to the side and up. Notes min improvements since lv. Consistent with HEP.    Objective: See treatment diary below.     Assessment: Pt with good tolerance to treatment. Progressed postural exercises with isometrics, used 1/2 foam against wall for cuing with good challenge and fatigue. Added seated thoracic extensions to encourage increased thoracic mobility and posture. Thoracic mobility is very limited. Favorable response to manual techniques with improved L shoulder ROM and mobility upon completion. Will continue to progress as able. Pt would benefit from continued skilled PT to improve current deficits and maximize function.    Plan: Progress program as tolerated.     Precautions:   Patient Active Problem List   Diagnosis    Abnormal electromyogram    Benign essential hypertension    BPH (benign prostatic hyperplasia)    DDD (degenerative disc disease), lumbosacral    Diastasis recti    Impaired fasting glucose    Other male erectile dysfunction    Low HDL (under 40)    Snoring    Spinal stenosis of lumbar region    Bilateral primary osteoarthritis of knee    Hypovolemia    Hyponatremia    Obesity, morbid (HCC)     Daily Treatment Diary      Visit # 11 12 13   6 7 8 9 10   Assessment 5/13  5/15 5/28  6/11 6/18 4/24 4/29 5/1 5/6 5/8   Eval/Reval                 FOTO         42/58       Manuals    L GH Mobs: Dist, Inf, Post  SE  SE SE SE  SE SE SE SE SE    L Shoulder PROM SE SE SE SE  SE SE SE SE SE                Prescribed POC    Pt Education                       HEP Issued/Updated                      Pec/Biceps Stretch  "with MHP To Begin             H/L pec stretch    15\"x4          Pulleys with L Shoulder MHP 20\"x5 min 20\"x5 min reviewed  5\"x5 min   10\"x5 min  20\"x5 min w/MHP 20\"x5 min 20\"x5 min 20\"x5 min    Table Slides: Flexion & Scaption    5\"x10 ea 5\"  2x10 ea         Table ER Stretch    15\"x4 15\"x4         H/L Wand Press-Ups              H/L Wand Flexion Standing  3#  5\"  1x10 Standing  3#  5\"  1x10     Standing  2#  5\"  1x10 Standing  2#  5\"  1x15 Standing  3#  5\"  1x10 Standing  3#  5\"  1x8  *fatigue    Flexion Wall Slides 5\"x10 5\"x10 reviewed    5\"x10 5\"x10 5\"x5 5\"x10    TB Rows Green  5\"  2x10 Blue  5\"  2x10 Reviewed    Pink  5\"  1x10  1x5 Green  5\"  2x10 Green  5\"  2x10 Green  5\"  2x10    TB Ext Green  5\"  2x10 Blue  5\"  2x10 reviewed    Pink  1x10  1x5 Green  5\"  2x10 Green  5\"  2x10 Green  5\"  2x10    TB Add Pink  5\"  1x10  1x5 Pink  5\"  1x10  1x5 reviewed    Pink  5\"  1x10 Pink  5\"  1x10 Pink  5\"  1x10 Pink  5\"  1x10  1x5    Scap Retractions          5\"  2x10 5\"  2x10   1/2 foam against wall         Low Trap Retractions with B Shoulder ER       5\"x10  1/2 foam against wall        Seated thoracic ext     5\"x10                      Pec/Biceps Stretch with CP To Conclude        5'          np                  np                 Modalities   MHP + Pec/Biceps Stretch    5' pre-tx    5' w/pulleys np 5' w/pulleys 5' w/pulleys   CP +Pec/Biceps Stretch 5' 5' np    5' w/pec stretch np     QR Code:    Access Code: [email or web address]  URL: [email or web address]  Date: 05/28/2025  Prepared by: Sherry Mclean    Exercises  - Supine Single Arm Pectoralis Stretch  - 1-2 x daily - 31 sets - 3 reps - 15 hold  - Seated Shoulder Flexion Towel Slide at Table Top Full Range of Motion  - 1-2 x daily - 1 sets - 10 reps - 5 hold  - Seated Shoulder Scaption Slide at Table Top with Forearm in Neutral  - 1-2 x daily - 1 sets - 10 reps - 5 hold  - Seated Shoulder External Rotation PROM on Table  - 1-2 x daily - 1 sets - 3 reps - 10 hold  - " Seated Scapular Retraction  - 1-2 x daily - 1 sets - 10 reps - 5 hold  - Shoulder External Rotation and Scapular Retraction  - 1 x daily - 1 sets - 10 reps - 5 hold  - Supine Shoulder Press AAROM in Abduction with Dowel  - 1 x daily - 2 sets - 10 reps - 5 hold  - Standing Shoulder Row with Anchored Resistance  - 1 x daily - 2 sets - 10 reps - 5 hold  - Shoulder extension with resistance - Neutral  - 1 x daily - 2 sets - 10 reps - 5 hold  - Standing Shoulder Flexion AAROM with Dowel  - 1 x daily - 1 sets - 10 reps - 5 hold  - Shoulder Adduction with Anchored Resistance  - 1 x daily - 1 sets - 10 reps - 5 hold    Patient Education  - Ice

## 2025-06-25 ENCOUNTER — TELEPHONE (OUTPATIENT)
Age: 70
End: 2025-06-25

## 2025-06-25 ENCOUNTER — OFFICE VISIT (OUTPATIENT)
Dept: PHYSICAL THERAPY | Facility: REHABILITATION | Age: 70
End: 2025-06-25
Payer: MEDICARE

## 2025-06-25 DIAGNOSIS — M25.512 CHRONIC LEFT SHOULDER PAIN: Primary | ICD-10-CM

## 2025-06-25 DIAGNOSIS — G89.29 CHRONIC LEFT SHOULDER PAIN: Primary | ICD-10-CM

## 2025-06-25 PROCEDURE — 97110 THERAPEUTIC EXERCISES: CPT

## 2025-06-25 PROCEDURE — 97112 NEUROMUSCULAR REEDUCATION: CPT

## 2025-06-25 PROCEDURE — 97140 MANUAL THERAPY 1/> REGIONS: CPT

## 2025-06-25 NOTE — PROGRESS NOTES
"Daily Note     Today's date: 2025  Patient name: Markie Barrios  : 1955  MRN: [id number]  Referring provider: Michael Huitron*  Dx:   Encounter Diagnosis     ICD-10-CM    1. Chronic left shoulder pain  M25.512     G89.29                      Subjective: Pt reports mild improvement in symptoms, he still has good and bad days and watches how he is reaching with his L UE to avoid pain.      Objective: See treatment diary below.     Assessment: Pt with good tolerance to exercises in current program. Added light scapular strengthening with TB back into program without adverse response. Good tolerance to manual techniques with moderate cues for relaxation. Will continue to progress as able. Pt would benefit from continued skilled PT to improve current deficits and maximize function.    Plan: Progress program as tolerated.     Precautions:   Patient Active Problem List   Diagnosis    Abnormal electromyogram    Benign essential hypertension    BPH (benign prostatic hyperplasia)    DDD (degenerative disc disease), lumbosacral    Diastasis recti    Impaired fasting glucose    Other male erectile dysfunction    Low HDL (under 40)    Snoring    Spinal stenosis of lumbar region    Bilateral primary osteoarthritis of knee    Hypovolemia    Hyponatremia    Obesity, morbid (HCC)     Daily Treatment Diary      Visit # 11 12 13    7 8 9 10   Assessment 5/13  5/15 5/28  6/11 6/18 6/25 4/29 5/1 5/6 5/8   Eval/Reval                 FOTO                Manuals    L GH Mobs: Dist, Inf, Post  SE  SE SE SE SE SE SE SE SE SE    L Shoulder PROM SE SE SE SE SE SE SE SE SE SE                Prescribed POC    Pt Education                       HEP Issued/Updated                      Pec/Biceps Stretch with MHP To Begin             H/L pec stretch    15\"x4          Pulleys with L Shoulder MHP 20\"x5 min 20\"x5 min reviewed  5\"x5 min   10\"x5 min 10\"x5 min 20\"x5 min w/MHP 20\"x5 min 20\"x5 min 20\"x5 min    Table Slides: " "Flexion & Scaption    5\"x10 ea 5\"  2x10 ea 5\"  2x10 ea        Table ER Stretch    15\"x4 15\"x4 15\"x4        H/L Wand Press-Ups              H/L Wand Flexion Standing  3#  5\"  1x10 Standing  3#  5\"  1x10     Standing  2#  5\"  1x10 Standing  2#  5\"  1x15 Standing  3#  5\"  1x10 Standing  3#  5\"  1x8  *fatigue    Flexion Wall Slides 5\"x10 5\"x10 reviewed    5\"x10 5\"x10 5\"x5 5\"x10    TB Rows Green  5\"  2x10 Blue  5\"  2x10 Reviewed  OTB  5\"x10  Pink  5\"  1x10  1x5 Green  5\"  2x10 Green  5\"  2x10 Green  5\"  2x10    TB Ext Green  5\"  2x10 Blue  5\"  2x10 reviewed  OTB  5\"x10  Pink  1x10  1x5 Green  5\"  2x10 Green  5\"  2x10 Green  5\"  2x10    TB Add Pink  5\"  1x10  1x5 Pink  5\"  1x10  1x5 reviewed  OTB  5\"x10  Pink  5\"  1x10 Pink  5\"  1x10 Pink  5\"  1x10 Pink  5\"  1x10  1x5    Scap Retractions          5\"  2x10 5\"  2x10   1/2 foam against wall 5\"  2x10  1/2 foam against wall        Low Trap Retractions with B Shoulder ER       5\"x10  1/2 foam against wall 5\"x10  1/2 foam against wall       Seated thoracic ext     5\"x10 5\"x10                     Pec/Biceps Stretch with CP To Conclude        5'          np                  np                 Modalities   MHP + Pec/Biceps Stretch    5' pre-tx    5' w/pulleys np 5' w/pulleys 5' w/pulleys   CP +Pec/Biceps Stretch 5' 5' np    5' w/pec stretch np     QR Code:    Access Code: [email or web address]  URL: [email or web address]  Date: 05/28/2025  Prepared by: Sherry Mclean    Exercises  - Supine Single Arm Pectoralis Stretch  - 1-2 x daily - 31 sets - 3 reps - 15 hold  - Seated Shoulder Flexion Towel Slide at Table Top Full Range of Motion  - 1-2 x daily - 1 sets - 10 reps - 5 hold  - Seated Shoulder Scaption Slide at Table Top with Forearm in Neutral  - 1-2 x daily - 1 sets - 10 reps - 5 hold  - Seated Shoulder External Rotation PROM on Table  - 1-2 x daily - 1 sets - 3 reps - 10 hold  - Seated Scapular Retraction  - 1-2 x daily - 1 sets - 10 reps - 5 hold  - Shoulder External Rotation " and Scapular Retraction  - 1 x daily - 1 sets - 10 reps - 5 hold  - Supine Shoulder Press AAROM in Abduction with Dowel  - 1 x daily - 2 sets - 10 reps - 5 hold  - Standing Shoulder Row with Anchored Resistance  - 1 x daily - 2 sets - 10 reps - 5 hold  - Shoulder extension with resistance - Neutral  - 1 x daily - 2 sets - 10 reps - 5 hold  - Standing Shoulder Flexion AAROM with Dowel  - 1 x daily - 1 sets - 10 reps - 5 hold  - Shoulder Adduction with Anchored Resistance  - 1 x daily - 1 sets - 10 reps - 5 hold    Patient Education  - Ice

## 2025-06-25 NOTE — TELEPHONE ENCOUNTER
Patient called to confirm appointment with [facility] on 9/2 as well as to confirm PSA orders and timing of testing. Will get that done at least a week prior to appointment.

## 2025-07-02 ENCOUNTER — OFFICE VISIT (OUTPATIENT)
Dept: PHYSICAL THERAPY | Facility: REHABILITATION | Age: 70
End: 2025-07-02
Payer: MEDICARE

## 2025-07-02 DIAGNOSIS — G89.29 CHRONIC LEFT SHOULDER PAIN: Primary | ICD-10-CM

## 2025-07-02 DIAGNOSIS — M25.512 CHRONIC LEFT SHOULDER PAIN: Primary | ICD-10-CM

## 2025-07-02 PROCEDURE — 97140 MANUAL THERAPY 1/> REGIONS: CPT

## 2025-07-02 PROCEDURE — 97110 THERAPEUTIC EXERCISES: CPT

## 2025-07-02 NOTE — PROGRESS NOTES
"Daily Note     Today's date: 2025  Patient name: Markie Barrios  : 1955  MRN: [id number]  Referring provider: Michael Huitron*  Dx:   Encounter Diagnosis     ICD-10-CM    1. Chronic left shoulder pain  M25.512     G89.29                      Subjective: Pt reports his L UE is better, stating he notes that it is easier to reach out to the side. He reports yesterday he felt a \"grabbing\" sensation in upper arm a few times.     Objective: See treatment diary below.     Assessment: Pt with good tolerance to treatment with TB strengthening progressions made as well as increased hold time for table slides. Pt notes most discomfort during TB adduction. Fair tolerance to manual techniques with mild pain reported, cues for relaxation during completion. Will continue to progress as able. Pt denies adverse response post treatment and would benefit from continued skilled PT to improve current deficits and maximize function.    Plan: Progress program as tolerated.     Precautions:   Patient Active Problem List   Diagnosis    Abnormal electromyogram    Benign essential hypertension    BPH (benign prostatic hyperplasia)    DDD (degenerative disc disease), lumbosacral    Diastasis recti    Impaired fasting glucose    Other male erectile dysfunction    Low HDL (under 40)    Snoring    Spinal stenosis of lumbar region    Bilateral primary osteoarthritis of knee    Hypovolemia    Hyponatremia    Obesity, morbid (HCC)     Daily Treatment Diary      Visit # 11 12 13     8 9 10   Assessment 5/13  5/15 5/28  6/11 6/18 6/25 7/2 5/1 5/6 5/8   Eval/Reval                 FOTO                Manuals    L GH Mobs: Dist, Inf, Post  SE  SE SE SE SE SE SE SE SE SE    L Shoulder PROM SE SE SE SE SE SE SE SE SE SE                Prescribed POC    Pt Education                       HEP Issued/Updated                      Pec/Biceps Stretch with MHP To Begin             H/L pec stretch    15\"x4          Pulleys with L Shoulder " "MHP 20\"x5 min 20\"x5 min reviewed  5\"x5 min   10\"x5 min 10\"x5 min 10\"x5 min 20\"x5 min 20\"x5 min 20\"x5 min    Table Slides: Flexion & Scaption    5\"x10 ea 5\"  2x10 ea 5\"  2x10 ea 10\"x10       Table ER Stretch    15\"x4 15\"x4 15\"x4 10\"x10       H/L Wand Press-Ups              H/L Wand Flexion Standing  3#  5\"  1x10 Standing  3#  5\"  1x10      Standing  2#  5\"  1x15 Standing  3#  5\"  1x10 Standing  3#  5\"  1x8  *fatigue    Flexion Wall Slides 5\"x10 5\"x10 reviewed     5\"x10 5\"x5 5\"x10    TB Rows Green  5\"  2x10 Blue  5\"  2x10 Reviewed  OTB  5\"x10  Pink  5\"  2x10 Green  5\"  2x10 Green  5\"  2x10 Green  5\"  2x10    TB Ext Green  5\"  2x10 Blue  5\"  2x10 reviewed  OTB  5\"x10  Pink  5\"  2x10 Green  5\"  2x10 Green  5\"  2x10 Green  5\"  2x10    TB Add Pink  5\"  1x10  1x5 Pink  5\"  1x10  1x5 reviewed  OTB  5\"x10  OTB  5\" Pink  5\"  1x10 Pink  5\"  1x10 Pink  5\"  1x10  1x5    Scap Retractions          5\"  2x10 5\"  2x10   1/2 foam against wall 5\"  2x10  1/2 foam against wall 5\"  2x10  1/2 foam for tactile cuing       Low Trap Retractions with B Shoulder ER       5\"x10  1/2 foam against wall 5\"x10  1/2 foam against wall 5\"x10  1/2  foam for tactile cuing      Seated thoracic ext     5\"x10 5\"x10                     Pec/Biceps Stretch with CP To Conclude                  np                  np                 Modalities   MHP + Pec/Biceps Stretch    5' pre-tx     np 5' w/pulleys 5' w/pulleys   CP +Pec/Biceps Stretch 5' 5' np     np     QR Code:    Access Code: [email or web address]  URL: [email or web address]  Date: 05/28/2025  Prepared by: Sherry Mclean    Exercises  - Supine Single Arm Pectoralis Stretch  - 1-2 x daily - 31 sets - 3 reps - 15 hold  - Seated Shoulder Flexion Towel Slide at Table Top Full Range of Motion  - 1-2 x daily - 1 sets - 10 reps - 5 hold  - Seated Shoulder Scaption Slide at Table Top with Forearm in Neutral  - 1-2 x daily - 1 sets - 10 reps - 5 hold  - Seated Shoulder External Rotation PROM on Table  - 1-2 x " daily - 1 sets - 3 reps - 10 hold  - Seated Scapular Retraction  - 1-2 x daily - 1 sets - 10 reps - 5 hold  - Shoulder External Rotation and Scapular Retraction  - 1 x daily - 1 sets - 10 reps - 5 hold  - Supine Shoulder Press AAROM in Abduction with Dowel  - 1 x daily - 2 sets - 10 reps - 5 hold  - Standing Shoulder Row with Anchored Resistance  - 1 x daily - 2 sets - 10 reps - 5 hold  - Shoulder extension with resistance - Neutral  - 1 x daily - 2 sets - 10 reps - 5 hold  - Standing Shoulder Flexion AAROM with Dowel  - 1 x daily - 1 sets - 10 reps - 5 hold  - Shoulder Adduction with Anchored Resistance  - 1 x daily - 1 sets - 10 reps - 5 hold    Patient Education  - Ice

## 2025-07-09 ENCOUNTER — OFFICE VISIT (OUTPATIENT)
Dept: PHYSICAL THERAPY | Facility: REHABILITATION | Age: 70
End: 2025-07-09
Payer: MEDICARE

## 2025-07-09 DIAGNOSIS — M25.512 CHRONIC LEFT SHOULDER PAIN: Primary | ICD-10-CM

## 2025-07-09 DIAGNOSIS — G89.29 CHRONIC LEFT SHOULDER PAIN: Primary | ICD-10-CM

## 2025-07-09 PROCEDURE — 97140 MANUAL THERAPY 1/> REGIONS: CPT

## 2025-07-09 PROCEDURE — 97110 THERAPEUTIC EXERCISES: CPT

## 2025-07-09 NOTE — PROGRESS NOTES
"Daily Note     Today's date: 2025  Patient name: Markie Barrios  : 1955  MRN: [id number]  Referring provider: Michael Huitron*  Dx:   Encounter Diagnosis     ICD-10-CM    1. Chronic left shoulder pain  M25.512     G89.29                      Subjective: Pt reports he has been outside a lot doing yard work in the past week and his L shoulder has been feeling \"pretty good, a little better\"  .   Objective: See treatment diary below.     Assessment: Pt with favorable response to manual techniques with improved L shoulder ROM upon completion. Added standing wand scaption without adverse response. Good tolerance to TB strengthening. No increased pain or adverse response noted post treatment. Will continue to progress as able.    Plan: Progress program as tolerated.     Precautions:   Patient Active Problem List   Diagnosis    Abnormal electromyogram    Benign essential hypertension    BPH (benign prostatic hyperplasia)    DDD (degenerative disc disease), lumbosacral    Diastasis recti    Impaired fasting glucose    Other male erectile dysfunction    Low HDL (under 40)    Snoring    Spinal stenosis of lumbar region    Bilateral primary osteoarthritis of knee    Hypovolemia    Hyponatremia    Obesity, morbid (HCC)     Daily Treatment Diary      Visit # 11 12 13      9 10   Assessment 5/13  5/15 5/28  6/11 6/18 6/25 7/2 7/9 5/6 5/8   Eval/Reval                 FOTO                Manuals    L GH Mobs: Dist, Inf, Post  SE  SE SE SE SE SE SE SE SE SE    L Shoulder PROM SE SE SE SE SE SE SE SE SE SE                Prescribed POC    Pt Education                       HEP Issued/Updated                      Pec/Biceps Stretch with MHP To Begin             H/L pec stretch    15\"x4          Pulleys with L Shoulder MHP 20\"x5 min 20\"x5 min reviewed  5\"x5 min   10\"x5 min 10\"x5 min 10\"x5 min 10\"x5 min 20\"x5 min 20\"x5 min    Table Slides: Flexion & Scaption    5\"x10 ea 5\"  2x10 ea 5\"  2x10 ea 10\"x10 10\"x10      " "Table ER Stretch    15\"x4 15\"x4 15\"x4 10\"x10 10\"x10      H/L Wand Press-Ups              H/L Wand Flexion Standing  3#  5\"  1x10 Standing  3#  5\"  1x10       Standing  3#  5\"  1x10 Standing  3#  5\"  1x8  *fatigue    Flexion Wall Slides 5\"x10 5\"x10 reviewed      5\"x5 5\"x10    TB Rows Green  5\"  2x10 Blue  5\"  2x10 Reviewed  OTB  5\"x10  Pink  5\"  2x10 Pink  5\"  2x10 Green  5\"  2x10 Green  5\"  2x10    TB Ext Green  5\"  2x10 Blue  5\"  2x10 reviewed  OTB  5\"x10  Pink  5\"  2x10 Pink  5\"  2x10 Green  5\"  2x10 Green  5\"  2x10    TB Add Pink  5\"  1x10  1x5 Pink  5\"  1x10  1x5 reviewed  OTB  5\"x10  OTB  5\" OTB  5\"  1x10 Pink  5\"  1x10 Pink  5\"  1x10  1x5    Scap Retractions          5\"  2x10 5\"  2x10   1/2 foam against wall 5\"  2x10  1/2 foam against wall 5\"  2x10  1/2 foam for tactile cuing       Low Trap Retractions with B Shoulder ER       5\"x10  1/2 foam against wall 5\"x10  1/2 foam against wall 5\"x10  1/2  foam for tactile cuing      Seated thoracic ext     5\"x10 5\"x10                     Pec/Biceps Stretch with CP To Conclude                  np                  np                 Modalities   MHP + Pec/Biceps Stretch    5' pre-tx     np 5' w/pulleys 5' w/pulleys   CP +Pec/Biceps Stretch 5' 5' np     np     QR Code:    Access Code: [email or web address]  URL: [email or web address]  Date: 05/28/2025  Prepared by: Sherry Mclean    Exercises  - Supine Single Arm Pectoralis Stretch  - 1-2 x daily - 31 sets - 3 reps - 15 hold  - Seated Shoulder Flexion Towel Slide at Table Top Full Range of Motion  - 1-2 x daily - 1 sets - 10 reps - 5 hold  - Seated Shoulder Scaption Slide at Table Top with Forearm in Neutral  - 1-2 x daily - 1 sets - 10 reps - 5 hold  - Seated Shoulder External Rotation PROM on Table  - 1-2 x daily - 1 sets - 3 reps - 10 hold  - Seated Scapular Retraction  - 1-2 x daily - 1 sets - 10 reps - 5 hold  - Shoulder External Rotation and Scapular Retraction  - 1 x daily - 1 sets - 10 reps - 5 hold  - Supine " Shoulder Press AAROM in Abduction with Dowel  - 1 x daily - 2 sets - 10 reps - 5 hold  - Standing Shoulder Row with Anchored Resistance  - 1 x daily - 2 sets - 10 reps - 5 hold  - Shoulder extension with resistance - Neutral  - 1 x daily - 2 sets - 10 reps - 5 hold  - Standing Shoulder Flexion AAROM with Dowel  - 1 x daily - 1 sets - 10 reps - 5 hold  - Shoulder Adduction with Anchored Resistance  - 1 x daily - 1 sets - 10 reps - 5 hold    Patient Education  - Ice

## 2025-07-11 DIAGNOSIS — R35.0 BENIGN PROSTATIC HYPERPLASIA WITH URINARY FREQUENCY: ICD-10-CM

## 2025-07-11 DIAGNOSIS — N40.1 BENIGN PROSTATIC HYPERPLASIA WITH URINARY FREQUENCY: ICD-10-CM

## 2025-07-11 RX ORDER — FINASTERIDE 5 MG/1
5 TABLET, FILM COATED ORAL DAILY
Qty: 90 TABLET | Refills: 1 | Status: SHIPPED | OUTPATIENT
Start: 2025-07-11

## 2025-07-11 RX ORDER — FINASTERIDE 5 MG/1
5 TABLET, FILM COATED ORAL DAILY
Refills: 0 | Status: CANCELLED | OUTPATIENT
Start: 2025-07-11

## 2025-07-11 NOTE — TELEPHONE ENCOUNTER
Reason for call:   [x] Refill   [] Prior Auth  [] Other:     Office:   [] PCP/Provider -   [x] Specialty/Provider - [facility]     Medication: finasteride (PROSCAR) 5 mg tablet     Dose/Frequency: 5 mg, Daily     Quantity: 90    Pharmacy: [facility]     Local Pharmacy   Does the patient have enough for 3 days?   [] Yes   [x] No - Send as HP to POD    Mail Away Pharmacy   Does the patient have enough for 10 days?   [] Yes   [x] No - Send as HP to POD

## 2025-07-16 ENCOUNTER — OFFICE VISIT (OUTPATIENT)
Dept: PHYSICAL THERAPY | Facility: REHABILITATION | Age: 70
End: 2025-07-16
Payer: MEDICARE

## 2025-07-16 DIAGNOSIS — M25.512 CHRONIC LEFT SHOULDER PAIN: Primary | ICD-10-CM

## 2025-07-16 DIAGNOSIS — G89.29 CHRONIC LEFT SHOULDER PAIN: Primary | ICD-10-CM

## 2025-07-16 PROCEDURE — 97110 THERAPEUTIC EXERCISES: CPT

## 2025-07-16 PROCEDURE — 97140 MANUAL THERAPY 1/> REGIONS: CPT

## 2025-07-16 PROCEDURE — 97112 NEUROMUSCULAR REEDUCATION: CPT

## 2025-07-16 NOTE — PROGRESS NOTES
"Daily Note     Today's date: 2025  Patient name: Markie Barrios  : 1955  MRN: [id number]  Referring provider: Michael Huitron*  Dx:   Encounter Diagnosis     ICD-10-CM    1. Chronic left shoulder pain  M25.512     G89.29                      Subjective: Pt reports his L shoulder is not as good as last week, a little more pain with reaching activities. However, overall, states \"it's not too bad\". He has a f/u appt with Dr. Flood on .     Objective: See treatment diary below.     Assessment: Pt with good tolerance to treatment. Continues to have most discomfort during TB shoulder ADD. Good tolerance to remainder of strengthening. Able to perform table slides at incline, initiate wall slides. Favorable response to manual techniques with improved ROM upon completion. Will continue to progress as able. Pt would benefit from continued skilled PT to improve current deficits and maximize function.    Plan: Progress program as tolerated.     Precautions:   Patient Active Problem List   Diagnosis    Abnormal electromyogram    Benign essential hypertension    BPH (benign prostatic hyperplasia)    DDD (degenerative disc disease), lumbosacral    Diastasis recti    Impaired fasting glucose    Other male erectile dysfunction    Low HDL (under 40)    Snoring    Spinal stenosis of lumbar region    Bilateral primary osteoarthritis of knee    Hypovolemia    Hyponatremia    Obesity, morbid (HCC)     Daily Treatment Diary      Visit # 11 12 13       10   Assessment 5/13  5/15 5/28  6/11 6/18 6/25 7   Eval/Reval                 FOTO                Manuals    L GH Mobs: Dist, Inf, Post  SE  SE SE SE SE SE SE SE SE SE    L Shoulder PROM SE SE SE SE SE SE SE SE SE SE                Prescribed POC    Pt Education                       HEP Issued/Updated                      Pec/Biceps Stretch with MHP To Begin             H/L pec stretch    15\"x4          Pulleys with L Shoulder MHP 20\"x5 min " "20\"x5 min reviewed  5\"x5 min   10\"x5 min 10\"x5 min 10\"x5 min 10\"x5 min 10\"x5 min 20\"x5 min    Table Slides: Flexion & Scaption    5\"x10 ea 5\"  2x10 ea 5\"  2x10 ea 10\"x10 10\"x10 Inclined table  5\"x10     Table ER Stretch    15\"x4 15\"x4 15\"x4 10\"x10 10\"x10 10\"x10     H/L Wand Press-Ups              H/L Wand Flexion Standing  3#  5\"  1x10 Standing  3#  5\"  1x10       Standing flexion  Missed-resume NV* Standing  3#  5\"  1x8  *fatigue    Flexion Wall Slides 5\"x10 5\"x10 reviewed      5\"x5 5\"x10    TB Rows Green  5\"  2x10 Blue  5\"  2x10 Reviewed  OTB  5\"x10  Pink  5\"  2x10 Pink  5\"  2x10 Pink  5\"  2x10 Green  5\"  2x10    TB Ext Green  5\"  2x10 Blue  5\"  2x10 reviewed  OTB  5\"x10  Pink  5\"  2x10 Pink  5\"  2x10 Pink  5\"  2x10 Green  5\"  2x10    TB Add Pink  5\"  1x10  1x5 Pink  5\"  1x10  1x5 reviewed  OTB  5\"x10  OTB  5\" OTB  5\"  1x10 OTB  5\"  1x10 Pink  5\"  1x10  1x5    Scap Retractions          5\"  2x10 5\"  2x10   1/2 foam against wall 5\"  2x10  1/2 foam against wall 5\"  2x10  1/2 foam for tactile cuing       Low Trap Retractions with B Shoulder ER       5\"x10  1/2 foam against wall 5\"x10  1/2 foam against wall 5\"x10  1/2  foam for tactile cuing      Seated thoracic ext     5\"x10 5\"x10                     Pec/Biceps Stretch with CP To Conclude                  np                  np                 Modalities   MHP + Pec/Biceps Stretch    5' pre-tx     np  5' w/pulleys   CP +Pec/Biceps Stretch 5' 5' np     np     QR Code:    Access Code: [email or web address]  URL: [email or web address]  Date: 05/28/2025  Prepared by: Sherry Mclean    Exercises  - Supine Single Arm Pectoralis Stretch  - 1-2 x daily - 31 sets - 3 reps - 15 hold  - Seated Shoulder Flexion Towel Slide at Table Top Full Range of Motion  - 1-2 x daily - 1 sets - 10 reps - 5 hold  - Seated Shoulder Scaption Slide at Table Top with Forearm in Neutral  - 1-2 x daily - 1 sets - 10 reps - 5 hold  - Seated Shoulder External Rotation PROM on Table  - 1-2 x daily - 1 " sets - 3 reps - 10 hold  - Seated Scapular Retraction  - 1-2 x daily - 1 sets - 10 reps - 5 hold  - Shoulder External Rotation and Scapular Retraction  - 1 x daily - 1 sets - 10 reps - 5 hold  - Supine Shoulder Press AAROM in Abduction with Dowel  - 1 x daily - 2 sets - 10 reps - 5 hold  - Standing Shoulder Row with Anchored Resistance  - 1 x daily - 2 sets - 10 reps - 5 hold  - Shoulder extension with resistance - Neutral  - 1 x daily - 2 sets - 10 reps - 5 hold  - Standing Shoulder Flexion AAROM with Dowel  - 1 x daily - 1 sets - 10 reps - 5 hold  - Shoulder Adduction with Anchored Resistance  - 1 x daily - 1 sets - 10 reps - 5 hold    Patient Education  - Ice

## 2025-07-23 VITALS — HEIGHT: 74 IN | WEIGHT: 289 LBS | BODY MASS INDEX: 37.09 KG/M2

## 2025-07-23 DIAGNOSIS — M19.012 OSTEOARTHRITIS OF BILATERAL GLENOHUMERAL JOINTS: Primary | ICD-10-CM

## 2025-07-23 DIAGNOSIS — M19.011 OSTEOARTHRITIS OF BILATERAL GLENOHUMERAL JOINTS: Primary | ICD-10-CM

## 2025-07-23 PROCEDURE — 99214 OFFICE O/P EST MOD 30 MIN: CPT | Performed by: ORTHOPAEDIC SURGERY

## 2025-07-23 PROCEDURE — 20610 DRAIN/INJ JOINT/BURSA W/O US: CPT | Performed by: ORTHOPAEDIC SURGERY

## 2025-07-23 RX ORDER — ROPIVACAINE HYDROCHLORIDE 5 MG/ML
4 INJECTION, SOLUTION EPIDURAL; INFILTRATION; PERINEURAL
Status: COMPLETED | OUTPATIENT
Start: 2025-07-23 | End: 2025-07-23

## 2025-07-23 RX ORDER — METHYLPREDNISOLONE ACETATE 40 MG/ML
1 INJECTION, SUSPENSION INTRA-ARTICULAR; INTRALESIONAL; INTRAMUSCULAR; SOFT TISSUE
Status: COMPLETED | OUTPATIENT
Start: 2025-07-23 | End: 2025-07-23

## 2025-07-23 RX ADMIN — METHYLPREDNISOLONE ACETATE 1 ML: 40 INJECTION, SUSPENSION INTRA-ARTICULAR; INTRALESIONAL; INTRAMUSCULAR; SOFT TISSUE at 09:30

## 2025-07-23 RX ADMIN — ROPIVACAINE HYDROCHLORIDE 4 ML: 5 INJECTION, SOLUTION EPIDURAL; INFILTRATION; PERINEURAL at 09:30

## 2025-07-23 NOTE — PROGRESS NOTES
Orthopaedic Surgery - Office Note  Markie Barrios (69 y.o. male)   : 1955   MRN: [id number]  Encounter Date: 2025    Assessment & Plan  Osteoarthritis of bilateral glenohumeral joints  We did have a long discussion about treatment options for osteoarthritis of the shoulder.  This included conservative versus surgical treatment. He does understand that surgical treatment would be a shoulder replacement.  He would like to continue with conservative treatment at this time as long as it is working.  After discussion of risk and benefits the patient agreed to proceed with bilateral shoulder steroid injections in the glenohumeral joint.  These were prepared and injected sterilely and tolerated well.  Continue with modalities such as ice and heat and anti-inflammatories as needed.  Continue with physical therapy with progression to home program.  We did discuss the option for repeat injections going forward if needed.  Did review that if he does have a steroid injection it would be a 3-month wait between the injection and surgery.         Chief Complaint / Date of Onset  Bilateral shoulder pain since 2025  Injury Mechanism / Date  None  Surgery / Date  None    History of Present Illness   Markie Barrios is a 69 y.o. male who presents for repeat evaluation of bilateral shoulder pain. He reports his left is worse than the right. He did sustain a proximal bicep rupture to his right side which has not given him issues. He reports PT hurts his shoulder and it makes him nervous when he hears the cracking but it helps him keep the shoulders from becoming stiff. He takes OTC medication as needed. He was able to experience roughly two and a half months of relief with his bilateral shoulder CSIs in April. He would like to repeat these today.     Treatment Summary  Medications / Modalities  NSAIDs, Tylenol PRN   Bracing / Immobilization  None  Physical Therapy  Yes  Injections  2025 B/L GH CSI   Prior  "Surgeries  None  Other Treatments  None    Employment / Current Status  Retired      Sport / Organization / Current Status  Active       Review of Systems  Pertinent items are noted in HPI.  All other systems were reviewed and are negative.  A comprehensive review of systems was negative.      Physical Exam  Ht 6' 2\" (1.88 m)   Wt 131 kg (289 lb)   BMI 37.11 kg/m²   Cons: Appears well.  No apparent distress.  Psych: Alert. Oriented x3.  Mood and affect normal.  Eyes: PERRLA, EOMI  Resp: Normal effort.  No audible wheezing or stridor.  CV: Palpable pulse.  No discernable arrhythmia.  No LE edema.  Lymph:  No palpable cervical, axillary, or inguinal lymphadenopathy.  Skin: Warm.  No palpable masses.  No visible lesions.  Neuro: Normal muscle tone.  Normal and symmetric DTR's.       Bilateral shoulder Exam  Alignment / Posture:  Normal cervical alignment. Normal shoulder posture. Normal scapular position.  Inspection:  No swelling. No erythema. No ecchymosis. No deformity.  Palpation:  Mild tenderness at lateral aspect of the shoulder over the subacromial space.  Palpable crepitus with range of motion..  ROM:  Shoulder . Shoulder ER 45. Shoulder IR L2.  Strength:  5/5 supraspinatus, infraspinatus, and subscapularis.  Stability:  No objective shoulder instability.  Tests: (+) Speed. (+) Guthrie. (-) Valdez. (-) Neer. (-) Spurling.  Neurovascular:  Sensation intact in Ax/R/M/U nerve distributions. Sensation intact in all digital nerve distributions. Fingers warm and perfused.     Studies Reviewed  No studies to review      Large joint arthrocentesis: bilateral glenohumeral    Performed by: Robbi Flood MD  Authorized by: Robbi Flood MD    Universal Protocol:  procedure performed by consultantConsent: Verbal consent obtained  Consent given by: patient  Patient understanding: patient states understanding of the procedure being performed  Patient identity confirmed: verbally with " patient  Supporting Documentation  Indications: pain     Is this a Visco injection? NoProcedure Details  Location: shoulder - bilateral glenohumeral  Needle size: 22 G  Ultrasound guidance: no  Approach: anterior    Medications (Right): 1 mL methylPREDNISolone acetate 40 mg/mL; 4 mL ropivacaine 0.5 %Medications (Left): 1 mL methylPREDNISolone acetate 40 mg/mL; 4 mL ropivacaine 0.5 %   Patient tolerance: patient tolerated the procedure well with no immediate complications  Dressing:  Sterile dressing applied             Medical, Surgical, Family, and Social History  The patient's medical history, family history, and social history, were reviewed and updated as appropriate.    Past Medical History[1]    Past Surgical History[2]    Family History[3]    Social History     Occupational History    Occupation: retired   Tobacco Use    Smoking status: Never     Passive exposure: Never    Smokeless tobacco: Never   Vaping Use    Vaping status: Never Used   Substance and Sexual Activity    Alcohol use: Not Currently     Comment: occasional    Drug use: Never    Sexual activity: Not Currently       Allergies[4]    Current Medications[5]       Fabio Chun MD    Scribe Attestation      I,:   am acting as a scribe while in the presence of the attending physician.:       I,:   personally performed the services described in this documentation    as scribed in my presence.:                  [1]   Past Medical History:  Diagnosis Date    BPH (benign prostatic hyperplasia)     Dyslipidemia     Hypertension     Impaired fasting glucose     Osteoarthritis of both knees    [2]   Past Surgical History:  Procedure Laterality Date    COLONOSCOPY  11/23/2020    tubular adenoma; Dr Ross    REPLACEMENT TOTAL KNEE Right    [3]   Family History  Problem Relation Name Age of Onset    Hypertension Mother      Kidney disease Mother      No Known Problems Sister      No Known Problems Brother      No Known Problems Brother      No Known  Problems Son      No Known Problems Son      No Known Problems Daughter     [4] No Known Allergies  [5]   Current Outpatient Medications:     enalapril (VASOTEC) 20 mg tablet, TAKE 1 TABLET TWICE A DAY, Disp: 180 tablet, Rfl: 3    finasteride (PROSCAR) 5 mg tablet, Take 1 tablet (5 mg total) by mouth daily, Disp: 90 tablet, Rfl: 1    hydroCHLOROthiazide 25 mg tablet, TAKE 1 TABLET DAILY, Disp: 90 tablet, Rfl: 3    tamsulosin (FLOMAX) 0.4 mg, take 3 capsules by mouth once daily with DINNER, Disp: 270 capsule, Rfl: 1    sildenafil (VIAGRA) 50 MG tablet, Take 1 tablet (50 mg total) by mouth daily as needed for erectile dysfunction (Patient not taking: Reported on 10/8/2024), Disp: 10 tablet, Rfl: 3

## 2025-07-30 ENCOUNTER — OFFICE VISIT (OUTPATIENT)
Dept: PHYSICAL THERAPY | Facility: REHABILITATION | Age: 70
End: 2025-07-30
Payer: MEDICARE

## 2025-07-30 DIAGNOSIS — G89.29 CHRONIC LEFT SHOULDER PAIN: Primary | ICD-10-CM

## 2025-07-30 DIAGNOSIS — M25.512 CHRONIC LEFT SHOULDER PAIN: Primary | ICD-10-CM

## 2025-07-30 PROCEDURE — 97112 NEUROMUSCULAR REEDUCATION: CPT

## 2025-07-30 PROCEDURE — 97110 THERAPEUTIC EXERCISES: CPT

## 2025-07-30 PROCEDURE — 97140 MANUAL THERAPY 1/> REGIONS: CPT

## 2025-08-08 ENCOUNTER — APPOINTMENT (OUTPATIENT)
Dept: LAB | Facility: CLINIC | Age: 70
End: 2025-08-08
Payer: MEDICARE

## 2025-08-12 ENCOUNTER — OFFICE VISIT (OUTPATIENT)
Dept: PHYSICAL THERAPY | Facility: REHABILITATION | Age: 70
End: 2025-08-12
Payer: MEDICARE

## 2025-08-14 ENCOUNTER — OFFICE VISIT (OUTPATIENT)
Dept: FAMILY MEDICINE CLINIC | Facility: CLINIC | Age: 70
End: 2025-08-14
Payer: MEDICARE

## 2025-08-14 ENCOUNTER — TELEPHONE (OUTPATIENT)
Age: 70
End: 2025-08-14

## 2025-08-14 VITALS
TEMPERATURE: 96.5 F | DIASTOLIC BLOOD PRESSURE: 68 MMHG | BODY MASS INDEX: 37.47 KG/M2 | WEIGHT: 292 LBS | HEIGHT: 74 IN | HEART RATE: 84 BPM | SYSTOLIC BLOOD PRESSURE: 120 MMHG | OXYGEN SATURATION: 98 %

## 2025-08-14 DIAGNOSIS — M25.512 CHRONIC LEFT SHOULDER PAIN: ICD-10-CM

## 2025-08-14 DIAGNOSIS — R73.01 IMPAIRED FASTING GLUCOSE: ICD-10-CM

## 2025-08-14 DIAGNOSIS — M17.0 BILATERAL PRIMARY OSTEOARTHRITIS OF KNEE: ICD-10-CM

## 2025-08-14 DIAGNOSIS — I10 BENIGN ESSENTIAL HYPERTENSION: Primary | ICD-10-CM

## 2025-08-14 DIAGNOSIS — G89.29 CHRONIC LEFT SHOULDER PAIN: ICD-10-CM

## 2025-08-14 PROCEDURE — 99214 OFFICE O/P EST MOD 30 MIN: CPT | Performed by: FAMILY MEDICINE

## 2025-08-14 PROCEDURE — G2211 COMPLEX E/M VISIT ADD ON: HCPCS | Performed by: FAMILY MEDICINE

## 2025-08-20 PROBLEM — G89.29 CHRONIC LEFT SHOULDER PAIN: Status: ACTIVE | Noted: 2025-08-20

## 2025-08-20 PROBLEM — M25.512 CHRONIC LEFT SHOULDER PAIN: Status: ACTIVE | Noted: 2025-08-20
